# Patient Record
Sex: FEMALE | Race: WHITE | NOT HISPANIC OR LATINO | Employment: OTHER | URBAN - METROPOLITAN AREA
[De-identification: names, ages, dates, MRNs, and addresses within clinical notes are randomized per-mention and may not be internally consistent; named-entity substitution may affect disease eponyms.]

---

## 2017-06-14 ENCOUNTER — APPOINTMENT (OUTPATIENT)
Dept: LAB | Facility: CLINIC | Age: 77
End: 2017-06-14
Payer: MEDICARE

## 2017-06-14 DIAGNOSIS — E78.5 HYPERLIPIDEMIA: ICD-10-CM

## 2017-06-14 DIAGNOSIS — E03.9 HYPOTHYROIDISM: ICD-10-CM

## 2017-06-14 LAB
ALBUMIN SERPL BCP-MCNC: 3.4 G/DL (ref 3.5–5)
ALP SERPL-CCNC: 69 U/L (ref 46–116)
ALT SERPL W P-5'-P-CCNC: 24 U/L (ref 12–78)
ANION GAP SERPL CALCULATED.3IONS-SCNC: 8 MMOL/L (ref 4–13)
AST SERPL W P-5'-P-CCNC: 15 U/L (ref 5–45)
BASOPHILS # BLD AUTO: 0.06 THOUSANDS/ΜL (ref 0–0.1)
BASOPHILS NFR BLD AUTO: 1 % (ref 0–1)
BILIRUB SERPL-MCNC: 0.47 MG/DL (ref 0.2–1)
BUN SERPL-MCNC: 17 MG/DL (ref 5–25)
CALCIUM SERPL-MCNC: 8.6 MG/DL (ref 8.3–10.1)
CHLORIDE SERPL-SCNC: 104 MMOL/L (ref 100–108)
CHOLEST SERPL-MCNC: 182 MG/DL (ref 50–200)
CO2 SERPL-SCNC: 28 MMOL/L (ref 21–32)
CREAT SERPL-MCNC: 0.78 MG/DL (ref 0.6–1.3)
EOSINOPHIL # BLD AUTO: 0.33 THOUSAND/ΜL (ref 0–0.61)
EOSINOPHIL NFR BLD AUTO: 5 % (ref 0–6)
ERYTHROCYTE [DISTWIDTH] IN BLOOD BY AUTOMATED COUNT: 13.3 % (ref 11.6–15.1)
GFR SERPL CREATININE-BSD FRML MDRD: >60 ML/MIN/1.73SQ M
GLUCOSE P FAST SERPL-MCNC: 85 MG/DL (ref 65–99)
HCT VFR BLD AUTO: 43.2 % (ref 34.8–46.1)
HDLC SERPL-MCNC: 57 MG/DL (ref 40–60)
HGB BLD-MCNC: 14.2 G/DL (ref 11.5–15.4)
LDLC SERPL CALC-MCNC: 107 MG/DL (ref 0–100)
LYMPHOCYTES # BLD AUTO: 2.11 THOUSANDS/ΜL (ref 0.6–4.47)
LYMPHOCYTES NFR BLD AUTO: 32 % (ref 14–44)
MCH RBC QN AUTO: 30.7 PG (ref 26.8–34.3)
MCHC RBC AUTO-ENTMCNC: 32.9 G/DL (ref 31.4–37.4)
MCV RBC AUTO: 94 FL (ref 82–98)
MONOCYTES # BLD AUTO: 0.57 THOUSAND/ΜL (ref 0.17–1.22)
MONOCYTES NFR BLD AUTO: 9 % (ref 4–12)
NEUTROPHILS # BLD AUTO: 3.5 THOUSANDS/ΜL (ref 1.85–7.62)
NEUTS SEG NFR BLD AUTO: 53 % (ref 43–75)
NRBC BLD AUTO-RTO: 0 /100 WBCS
PLATELET # BLD AUTO: 261 THOUSANDS/UL (ref 149–390)
PMV BLD AUTO: 10.1 FL (ref 8.9–12.7)
POTASSIUM SERPL-SCNC: 4 MMOL/L (ref 3.5–5.3)
PROT SERPL-MCNC: 7.2 G/DL (ref 6.4–8.2)
RBC # BLD AUTO: 4.62 MILLION/UL (ref 3.81–5.12)
SODIUM SERPL-SCNC: 140 MMOL/L (ref 136–145)
T4 FREE SERPL-MCNC: 1.28 NG/DL (ref 0.76–1.46)
TRIGL SERPL-MCNC: 92 MG/DL
TSH SERPL DL<=0.05 MIU/L-ACNC: 2.25 UIU/ML (ref 0.36–3.74)
WBC # BLD AUTO: 6.58 THOUSAND/UL (ref 4.31–10.16)

## 2017-06-14 PROCEDURE — 85025 COMPLETE CBC W/AUTO DIFF WBC: CPT

## 2017-06-14 PROCEDURE — 80053 COMPREHEN METABOLIC PANEL: CPT

## 2017-06-14 PROCEDURE — 84439 ASSAY OF FREE THYROXINE: CPT

## 2017-06-14 PROCEDURE — 84443 ASSAY THYROID STIM HORMONE: CPT

## 2017-06-14 PROCEDURE — 36415 COLL VENOUS BLD VENIPUNCTURE: CPT

## 2017-06-14 PROCEDURE — 80061 LIPID PANEL: CPT

## 2017-06-16 ENCOUNTER — ALLSCRIPTS OFFICE VISIT (OUTPATIENT)
Dept: OTHER | Facility: OTHER | Age: 77
End: 2017-06-16

## 2017-06-16 DIAGNOSIS — E03.9 HYPOTHYROIDISM: ICD-10-CM

## 2017-06-16 DIAGNOSIS — I10 ESSENTIAL (PRIMARY) HYPERTENSION: ICD-10-CM

## 2017-12-20 ENCOUNTER — ALLSCRIPTS OFFICE VISIT (OUTPATIENT)
Dept: OTHER | Facility: OTHER | Age: 77
End: 2017-12-20

## 2017-12-20 DIAGNOSIS — E78.5 HYPERLIPIDEMIA: ICD-10-CM

## 2017-12-20 DIAGNOSIS — E55.9 VITAMIN D DEFICIENCY: ICD-10-CM

## 2017-12-20 DIAGNOSIS — R25.2 CRAMP AND SPASM: ICD-10-CM

## 2017-12-20 DIAGNOSIS — E03.9 HYPOTHYROIDISM: ICD-10-CM

## 2017-12-21 NOTE — PROGRESS NOTES
Assessment   1  Hyperlipidemia (272 4) (E78 5)   2  Hypertension, benign (401 1) (I10)   3  Hypothyroidism (244 9) (E03 9)   4  Leg cramping (729 82) (R25 2)    Plan   Allergic rhinitis    · Fluticasone Propionate 50 MCG/ACT Nasal Suspension; INSTILL 2 SPRAYS IN EACH    NOSTRIL EVERY DAY  Health Maintenance    · Aspirin 81 MG Oral Tablet Delayed Release; take 1 tablet by mouth every day  Hyperlipidemia    · (1) COMPREHENSIVE METABOLIC PANEL; Status:Active; Requested for:30Sro5033;    · (1) COMPREHENSIVE METABOLIC PANEL; Status:Active; Requested ICI:34KLA7548;    · (1) LIPID PANEL, FASTING; Status:Active; Requested for:96Hau2879;    · (1) LIPID PANEL, FASTING; Status:Active; Requested UXA:90TEZ1824;   Hypertension, benign    · Lisinopril 5 MG Oral Tablet; Take 1 tablet by mouth  daily   · (1) CBC/PLT/DIFF; Status:Canceled;    · (1) COMPREHENSIVE METABOLIC PANEL; Status:Canceled;    · (1) LIPID PANEL, FASTING; Status:Canceled; Hypothyroidism    · Levothyroxine Sodium 75 MCG Oral Tablet; Take 1 tablet by mouth  daily   · (1) T4, FREE; Status:Active; Requested for:05Tqf1247;    · (1) T4, FREE; Status:Active; Requested GZH:72UWN5528;    · (1) T4, FREE; Status:Canceled;    · (1) TSH; Status:Active; Requested for:92Gyy1110;    · (1) TSH; Status:Active; Requested RTH:48MUY3519;    · (1) TSH; Status:Canceled;   Leg cramping    · (1) MAGNESIUM; Status:Active; Requested for:83Xxk0502;   Screen for colon cancer    · (1) COLOGUARD; Status:Active;  Requested for:92Kuz0261;   cont  : I authorize [address] to obtain reimbursement for        Cologuard and to directly contact and collect a second sample from the paient        if reportable results are not obtained from the initial sample   cont  : I accept responsibility for maintaining the privacy of test results and        related information as required by HIPAA   : By ordering Cologuard, I certify that I am a licensed medical professional        authorized to order Cologuard  I acknowledge that the test is medically necessary and        that the patient is eligible to use Cologuard  Vitamin D deficiency    · (1) VITAMIN D 25-HYDROXY; Status:Active; Requested for:91Ehk2547;     Discussion/Summary   Discussion Summary:    Continue current medications  Have current labs done and we will discuss when available  Follow up in 6 months with labs  Counseling Documentation With Imm: The patient was counseled regarding diagnostic results,-- instructions for management,-- risk factor reductions,-- impressions,-- risks and benefits of treatment options,-- importance of compliance with treatment  Medication SE Review and Pt Understands Tx: Possible side effects of new medications were reviewed with the patient/guardian today  The treatment plan was reviewed with the patient/guardian  The patient/guardian understands and agrees with the treatment plan    Self Referrals:    Self Referrals: No      Chief Complaint   Chief Complaint Free Text Note Form: Patient here for follow up  History of Present Illness   HPI: Follow-up did not have her labs done for this visit  She will have them done and we will discuss when available  Controlled  Denies cp, palp, sob, edema, HA, dizziness  Her last lipids showed decent control on no medication  Last labs found her to be euthyroid  She is asymptomatic at this time  of mild upper respiratory symptoms at this time that have been gradually improving  cramps continue to be a problem  This has been going on since she was a teenager  She will spontaneously have cramping in her toes and or her feet  We discussed all available remedies, and other than trying gabapentin which had been ordered for her in the past she is pretty much tried everything  She can tolerate the discomfort therefor really does not want any medication, and does not want to try the gabapentin        Review of Systems   Complete-Female:      Constitutional: no fever,-- not feeling poorly,-- no chills-- and-- not feeling tired  Cardiovascular: No complaints of slow heart rate, no fast heart rate, no chest pain, no palpitations, no leg claudication, no lower extremity edema  Respiratory: No complaints of shortness of breath, no wheezing, no cough, no SOB on exertion, no orthopnea, no PND  Gastrointestinal: No complaints of abdominal pain, no constipation, no nausea or vomiting, no diarrhea, no bloody stools  Genitourinary: No complaints of dysuria, no incontinence, no pelvic pain, no dysmenorrhea, no vaginal discharge or bleeding  Musculoskeletal: as noted in HPI,-- no arthralgias-- and-- no myalgias  Integumentary: no rashes  Neurological: no headache,-- no confusion,-- no dizziness-- and-- no fainting  Hematologic/Lymphatic: No complaints of swollen glands, no swollen glands in the neck, does not bleed easily, does not bruise easily  Active Problems   1  Allergic rhinitis (477 9) (J30 9)   2  Drug therapy (V58 69) (Z79 899)   3  Hyperlipidemia (272 4) (E78 5)   4  Hypertension, benign (401 1) (I10)   5  Hypothyroidism (244 9) (E03 9)   6  Leg cramping (729 82) (R25 2)   7  Screen for colon cancer (V76 51) (Z12 11)   8  Screening for genitourinary condition (V81 6) (Z13 89)   9  Screening for osteoporosis (V82 81) (Z13 820)   10  Visit for screening mammogram (V76 12) (Z12 31)   11  Vitamin D deficiency (268 9) (E55 9)    Past Medical History   1  Denied: History of mental disorder   2  History of Lumbar stenosis (724 02) (M48 061)   3  History of Transient global amnesia (437 7) (G45 4)   4  History of Visit for screening mammogram (L55 91) (Z12 31)  Active Problems And Past Medical History Reviewed: The active problems and past medical history were reviewed and updated today  Surgical History   Surgical History Reviewed: The surgical history was reviewed and updated today  Family History   Mother    1   Family history of CVA (cerebral infarction)   2  Family history of   Father    3  Family history of    4  Family history of Pancreatic carcinoma  Sister    5  Family history of Hyperlipidemia  Family History Reviewed: The family history was reviewed and updated today  Social History    · Alcohol use   · Former smoker (V15 82) (H90 436)   · Full-time employment   · Denied: History of Illicit drug use   ·    · Three children  Social History Reviewed: The social history was reviewed and updated today  The social history was reviewed and is unchanged  Current Meds    1  Fluticasone Propionate 50 MCG/ACT Nasal Suspension; INSTILL 2 SPRAYS IN EACH NOSTRIL     EVERY DAY; Therapy: 81CJD6905 to (Mana Chaves)  Requested for: 92TFU0667; Last Rx:98Tin6528     Ordered   2  Levothyroxine Sodium 75 MCG Oral Tablet; Take 1 tablet by mouth  daily; Therapy: 69CEP2721 to (Evaluate:34Puf5799)  Requested for: 47GBU9921; Last Rx:01Ide7749     Ordered   3  Lisinopril 5 MG Oral Tablet; Take 1 tablet by mouth  daily; Therapy: 40BWL6561 to (Evaluate:57Dpv5643)  Requested for: 18SFW8644; Last WH:79BHK0506     Ordered  Medication List Reviewed: The medication list was reviewed and updated today  Allergies   1  Sulfa Drugs    Vitals   Vital Signs    Recorded: 2017 11:37AM   Temperature 97 9 F   Heart Rate 72   Systolic 646   Diastolic 82   Height 5 ft 2 in   Weight 134 lb    BMI Calculated 24 51   BSA Calculated 1 61   O2 Saturation 97     Physical Exam        Constitutional      General appearance: No acute distress, well appearing and well nourished  Eyes      Conjunctiva and lids: No swelling, erythema or discharge  Ears, Nose, Mouth, and Throat      External inspection of ears and nose: Normal        Otoscopic examination: Tympanic membranes translucent with normal light reflex  Canals patent without erythema         Nasal mucosa, septum, and turbinates: Normal without edema or erythema  Oropharynx: Normal with no erythema, edema, exudate or lesions  Pulmonary      Respiratory effort: No increased work of breathing or signs of respiratory distress  Auscultation of lungs: Clear to auscultation  Cardiovascular      Auscultation of heart: Normal rate and rhythm, normal S1 and S2, without murmurs  Examination of extremities for edema and/or varicosities: Normal        Carotid pulses: Normal        Lymphatic      Palpation of lymph nodes in neck: No lymphadenopathy  Musculoskeletal      Gait and station: Normal        Skin      Skin and subcutaneous tissue: Normal without rashes or lesions         Psychiatric      Mood and affect: Normal           Signatures    Electronically signed by : Sterling Conn [facility]; Dec 20 2017 12:15PM EST                       (Author)     Electronically signed by : Sohail Mcclure MD; Dec 20 2017  1:59PM EST

## 2018-01-12 ENCOUNTER — GENERIC CONVERSION - ENCOUNTER (OUTPATIENT)
Dept: OTHER | Facility: OTHER | Age: 78
End: 2018-01-12

## 2018-01-12 LAB — COLOGUARD (HISTORICAL): NORMAL

## 2018-01-12 NOTE — RESULT NOTES
Verified Results  (1) CBC/PLT/DIFF 33JIC7465 10:01AM [place] [facility] Order Number: [id number]     Test Name Result Flag Reference   WBC COUNT 7 51 Thousand/uL  4 31-10 16   RBC COUNT 4 89 Million/uL  3 81-5 12   HEMOGLOBIN 15 0 g/dL  11 5-15 4   HEMATOCRIT 45 6 %  34 8-46  1   MCV 93 fL  82-98   MCH 30 7 pg  26 8-34 3   MCHC 32 9 g/dL  31 4-37 4   RDW 13 4 %  11 6-15 1   MPV 10 0 fL  8 9-12 7   PLATELET COUNT 276 Thousands/uL  149-390   nRBC AUTOMATED 0 /100 WBCs     NEUTROPHILS RELATIVE PERCENT 53 %  43-75   LYMPHOCYTES RELATIVE PERCENT 32 %  14-44   MONOCYTES RELATIVE PERCENT 9 %  4-12   EOSINOPHILS RELATIVE PERCENT 4 %  0-6   BASOPHILS RELATIVE PERCENT 2 % H 0-1   NEUTROPHILS ABSOLUTE COUNT 4 00 Thousands/?L  1 85-7 62   LYMPHOCYTES ABSOLUTE COUNT 2 42 Thousands/?L  0 60-4 47   MONOCYTES ABSOLUTE COUNT 0 64 Thousand/?L  0 17-1 22   EOSINOPHILS ABSOLUTE COUNT 0 32 Thousand/?L  0 00-0 61   BASOPHILS ABSOLUTE COUNT 0 11 Thousands/?L H 0 00-0 10     (1) COMPREHENSIVE METABOLIC PANEL 99TZW5700 92:03TY [place] [facility] Order Number: [id number]     Test Name Result Flag Reference   GLUCOSE,RANDM 78 mg/dL     If the patient is fasting, the ADA then defines impaired fasting glucose as > 100 mg/dL and diabetes as > or equal to 123 mg/dL     SODIUM 139 mmol/L  136-145   POTASSIUM 3 6 mmol/L  3 5-5 3   CHLORIDE 102 mmol/L  100-108   CARBON DIOXIDE 30 mmol/L  21-32   ANION GAP (CALC) 7 mmol/L  4-13   BLOOD UREA NITROGEN 13 mg/dL  5-25   CREATININE 0 86 mg/dL  0 60-1 30   Standardized to IDMS reference method   CALCIUM 9 0 mg/dL  8 3-10 1   BILI, TOTAL 0 55 mg/dL  0 20-1 00   ALK PHOSPHATAS 72 U/L     ALT (SGPT) 25 U/L  12-78   AST(SGOT) 16 U/L  5-45   ALBUMIN 3 7 g/dL  3 5-5 0   TOTAL PROTEIN 7 7 g/dL  6 4-8 2   eGFR Non-African American      >60 0 ml/min/1 73sq m   [facility] Disease Education Program recommendations are as follows:  GFR calculation is accurate only with a steady state creatinine  Chronic Kidney disease less than 60 ml/min/1 73 sq  meters  Kidney failure less than 15 ml/min/1 73 sq  meters  (1) LIPID PANEL, FASTING 95Vjq3102 10:01AM [facility] Order Number: [id number]     Test Name Result Flag Reference   CHOLESTEROL 210 mg/dL H    HDL,DIRECT 59 mg/dL  40-60   Specimen collection should occur prior to Metamizole administration due to the potential for falsely depressed results  LDL CHOLESTEROL CALCULATED 128 mg/dL H 0-100   Triglyceride:         Normal              <150 mg/dl       Borderline High    150-199 mg/dl       High               200-499 mg/dl       Very High          >499 mg/dl  Cholesterol:         Desirable        <200 mg/dl      Borderline High  200-239 mg/dl      High             >239 mg/dl  HDL Cholesterol:        High    >59 mg/dL      Low     <41 mg/dL  LDL CALCULATED:    This screening LDL is a calculated result  It does not have the accuracy of the Direct Measured LDL in the monitoring of patients with hyperlipidemia and/or statin therapy  Direct Measure LDL (UPI405) must be ordered separately in these patients  TRIGLYCERIDES 117 mg/dL  <=150   Specimen collection should occur prior to N-Acetylcysteine or Metamizole administration due to the potential for falsely depressed results  (1) T4, FREE 24Byv2602 10:01AM [facility] Order Number: [id number]     Test Name Result Flag Reference   T4,FREE 1 24 ng/dL  0 76-1 46     (1) TSH 60HEA4099 10:01AM [facility] Order Number: [id number]     Test Name Result Flag Reference   TSH 6 180 uIU/mL H 0 358-3 740   Patients undergoing fluorescein dye angiography may retain small amounts of fluorescein in the body for 48-72 hours post procedure  Samples containing fluorescein can produce falsely depressed TSH values  If the patient had this procedure,a specimen should be resubmitted post fluorescein clearance            The recommended reference ranges for TSH during pregnancy are as follows:  First trimester 0 1 to 2 5 uIU/mL  Second trimester  0 2 to 3 0 uIU/mL  Third trimester 0 3 to 3 0 uIU/m

## 2018-01-13 VITALS
HEIGHT: 62 IN | OXYGEN SATURATION: 97 % | WEIGHT: 134 LBS | HEART RATE: 81 BPM | DIASTOLIC BLOOD PRESSURE: 80 MMHG | SYSTOLIC BLOOD PRESSURE: 140 MMHG | BODY MASS INDEX: 24.66 KG/M2

## 2018-01-18 NOTE — RESULT NOTES
Message   Labs OK  Cholesterol slightly high  Verified Results  (1) CBC/PLT/DIFF 01KWP2433 11:21AM [facility] Order Number: [id number]  TW Order Number: [id number]     Test Name Result Flag Reference   WBC COUNT 7 21 Thousand/uL  4 31-10 16   RBC COUNT 4 79 Million/uL  3 81-5 12   HEMOGLOBIN 14 7 g/dL  11 5-15 4   HEMATOCRIT 44 5 %  34 8-46  1   MCV 93 fL  82-98   MCH 30 7 pg  26 8-34 3   MCHC 33 0 g/dL  31 4-37 4   RDW 13 4 %  11 6-15 1   MPV 10 2 fL  8 9-12 7   PLATELET COUNT 618 Thousands/uL  149-390   nRBC AUTOMATED 0 /100 WBCs     NEUTROPHILS RELATIVE PERCENT 47 %  43-75   LYMPHOCYTES RELATIVE PERCENT 37 %  14-44   MONOCYTES RELATIVE PERCENT 9 %  4-12   EOSINOPHILS RELATIVE PERCENT 6 %  0-6   BASOPHILS RELATIVE PERCENT 1 %  0-1   NEUTROPHILS ABSOLUTE COUNT 3 37 Thousands/?L  1 85-7 62   LYMPHOCYTES ABSOLUTE COUNT 2 64 Thousands/?L  0 60-4 47   MONOCYTES ABSOLUTE COUNT 0 67 Thousand/?L  0 17-1 22   EOSINOPHILS ABSOLUTE COUNT 0 43 Thousand/?L  0 00-0 61   BASOPHILS ABSOLUTE COUNT 0 08 Thousands/?L  0 00-0 10     (1) COMPREHENSIVE METABOLIC PANEL 50ANN0881 61:54WK [facility]    Order Number: [id number]  TW Order Number: [id number] Order Number: [id number] Order Number: [id number] Order Number: [id number]     Test Name Result Flag Reference   GLUCOSE,RANDM 84 mg/dL     If the patient is fasting, the ADA then defines impaired fasting glucose as > 100 mg/dL and diabetes as > or equal to 123 mg/dL     SODIUM 137 mmol/L  136-145   POTASSIUM 4 1 mmol/L  3 5-5 3   CHLORIDE 102 mmol/L  100-108   CARBON DIOXIDE 30 mmol/L  21-32   ANION GAP (CALC) 5 mmol/L  4-13   BLOOD UREA NITROGEN 14 mg/dL  5-25   CREATININE 0 82 mg/dL  0 60-1 30   Standardized to IDMS reference method   CALCIUM 8 9 mg/dL  8 3-10 1   BILI, TOTAL 0 57 mg/dL  0 20-1 00   ALK PHOSPHATAS 63 U/L     ALT (SGPT) 26 U/L  12-78   AST(SGOT) 17 U/L  5-45 ALBUMIN 3 5 g/dL  3 5-5 0   TOTAL PROTEIN 7 5 g/dL  6 4-8 2   eGFR Non-African American      >60 0 ml/min/1 73sq [facility] Disease Education Program recommendations are as follows:  GFR calculation is accurate only with a steady state creatinine  Chronic Kidney disease less than 60 ml/min/1 73 sq  meters  Kidney failure less than 15 ml/min/1 73 sq  meters  (1) LIPID PANEL, FASTING 10Jun2016 11:21AM [facility] [place] Order Number: [id number]  TW Order Number: [id number] Order Number: [id number] Order Number: [id number] Order Number: [id number]     Test Name Result Flag Reference   CHOLESTEROL 199 mg/dL     HDL,DIRECT 57 mg/dL  40-60   Specimen collection should occur prior to Metamizole administration due to the potential for falsely depressed results  LDL CHOLESTEROL CALCULATED 121 mg/dL H 0-100   Triglyceride:         Normal              <150 mg/dl       Borderline High    150-199 mg/dl       High               200-499 mg/dl       Very High          >499 mg/dl  Cholesterol:         Desirable        <200 mg/dl      Borderline High  200-239 mg/dl      High             >239 mg/dl  HDL Cholesterol:        High    >59 mg/dL      Low     <41 mg/dL  LDL CALCULATED:    This screening LDL is a calculated result  It does not have the accuracy of the Direct Measured LDL in the monitoring of patients with hyperlipidemia and/or statin therapy  Direct Measure LDL (GGT997) must be ordered separately in these patients  TRIGLYCERIDES 106 mg/dL  <=150   Specimen collection should occur prior to N-Acetylcysteine or Metamizole administration due to the potential for falsely depressed results       (1) T4, FREE 10Jun2016 11:21AM [facility] [place] Order Number: [id number]  TW Order Number: [id number] Order Number: [id number] Order Number: [id number] Order Number: [id number]     Test Name Result Flag Reference   T4,FREE 1 16 ng/dL  0 76-1 46     (1) TSH 52LGF7288 11:21AM [facility] Order Number: [id number]  TW Order Number: [id number] Order Number: [id number] Order Number: [id number] Order Number: [id number]     Test Name Result Flag Reference   TSH 3 130 uIU/mL  0 358-3 740   The recommended reference ranges for TSH during pregnancy are as follows:  First trimester 0 1 to 2 5 uIU/mL  Second trimester  0 2 to 3 0 uIU/mL  Third trimester 0 3 to 3 0 uIU/m

## 2018-01-22 VITALS
OXYGEN SATURATION: 97 % | HEART RATE: 72 BPM | BODY MASS INDEX: 24.66 KG/M2 | SYSTOLIC BLOOD PRESSURE: 122 MMHG | TEMPERATURE: 97.9 F | WEIGHT: 134 LBS | HEIGHT: 62 IN | DIASTOLIC BLOOD PRESSURE: 82 MMHG

## 2018-02-07 DIAGNOSIS — I10 ESSENTIAL HYPERTENSION: Primary | ICD-10-CM

## 2018-02-07 RX ORDER — LISINOPRIL 5 MG/1
TABLET ORAL
Qty: 90 TABLET | Refills: 1 | Status: SHIPPED | OUTPATIENT
Start: 2018-02-07 | End: 2018-03-16 | Stop reason: SDUPTHER

## 2018-02-19 ENCOUNTER — TELEPHONE (OUTPATIENT)
Dept: INTERNAL MEDICINE CLINIC | Facility: CLINIC | Age: 78
End: 2018-02-19

## 2018-02-19 NOTE — TELEPHONE ENCOUNTER
Patient called and would like to know the result of her cologuard  She would like a call back when you know the results

## 2018-03-16 DIAGNOSIS — I10 ESSENTIAL HYPERTENSION: ICD-10-CM

## 2018-03-16 RX ORDER — LISINOPRIL 5 MG/1
5 TABLET ORAL DAILY
Qty: 90 TABLET | Refills: 1 | Status: SHIPPED | OUTPATIENT
Start: 2018-03-16 | End: 2018-08-25 | Stop reason: SDUPTHER

## 2018-03-16 NOTE — TELEPHONE ENCOUNTER
Please send refill of lisinopril 5 [facility]  Pt request 90 days supply with refills if possible

## 2018-04-06 ENCOUNTER — APPOINTMENT (OUTPATIENT)
Dept: LAB | Facility: CLINIC | Age: 78
End: 2018-04-06
Payer: MEDICARE

## 2018-04-06 DIAGNOSIS — E03.9 HYPOTHYROIDISM: ICD-10-CM

## 2018-04-06 DIAGNOSIS — R25.2 CRAMP AND SPASM: ICD-10-CM

## 2018-04-06 DIAGNOSIS — E78.5 HYPERLIPIDEMIA: ICD-10-CM

## 2018-04-06 DIAGNOSIS — E55.9 VITAMIN D DEFICIENCY: ICD-10-CM

## 2018-04-06 LAB
25(OH)D3 SERPL-MCNC: 31.8 NG/ML (ref 30–100)
ALBUMIN SERPL BCP-MCNC: 3.7 G/DL (ref 3.5–5)
ALP SERPL-CCNC: 69 U/L (ref 46–116)
ALT SERPL W P-5'-P-CCNC: 24 U/L (ref 12–78)
ANION GAP SERPL CALCULATED.3IONS-SCNC: 3 MMOL/L (ref 4–13)
AST SERPL W P-5'-P-CCNC: 19 U/L (ref 5–45)
BILIRUB SERPL-MCNC: 0.62 MG/DL (ref 0.2–1)
BUN SERPL-MCNC: 17 MG/DL (ref 5–25)
CALCIUM SERPL-MCNC: 9.1 MG/DL (ref 8.3–10.1)
CHLORIDE SERPL-SCNC: 102 MMOL/L (ref 100–108)
CHOLEST SERPL-MCNC: 196 MG/DL (ref 50–200)
CO2 SERPL-SCNC: 31 MMOL/L (ref 21–32)
CREAT SERPL-MCNC: 0.87 MG/DL (ref 0.6–1.3)
GFR SERPL CREATININE-BSD FRML MDRD: 65 ML/MIN/1.73SQ M
GLUCOSE P FAST SERPL-MCNC: 81 MG/DL (ref 65–99)
HDLC SERPL-MCNC: 61 MG/DL (ref 40–60)
LDLC SERPL CALC-MCNC: 108 MG/DL (ref 0–100)
MAGNESIUM SERPL-MCNC: 2.3 MG/DL (ref 1.6–2.6)
NONHDLC SERPL-MCNC: 135 MG/DL
POTASSIUM SERPL-SCNC: 4.1 MMOL/L (ref 3.5–5.3)
PROT SERPL-MCNC: 7.6 G/DL (ref 6.4–8.2)
SODIUM SERPL-SCNC: 136 MMOL/L (ref 136–145)
T4 FREE SERPL-MCNC: 1.07 NG/DL (ref 0.76–1.46)
TRIGL SERPL-MCNC: 133 MG/DL
TSH SERPL DL<=0.05 MIU/L-ACNC: 6.56 UIU/ML (ref 0.36–3.74)

## 2018-04-06 PROCEDURE — 84443 ASSAY THYROID STIM HORMONE: CPT

## 2018-04-06 PROCEDURE — 80053 COMPREHEN METABOLIC PANEL: CPT

## 2018-04-06 PROCEDURE — 82306 VITAMIN D 25 HYDROXY: CPT

## 2018-04-06 PROCEDURE — 84439 ASSAY OF FREE THYROXINE: CPT

## 2018-04-06 PROCEDURE — 80061 LIPID PANEL: CPT

## 2018-04-06 PROCEDURE — 83735 ASSAY OF MAGNESIUM: CPT

## 2018-04-06 PROCEDURE — 36415 COLL VENOUS BLD VENIPUNCTURE: CPT

## 2018-04-10 ENCOUNTER — TELEPHONE (OUTPATIENT)
Dept: INTERNAL MEDICINE CLINIC | Facility: CLINIC | Age: 78
End: 2018-04-10

## 2018-04-10 NOTE — TELEPHONE ENCOUNTER
I gave Patient the results from blood test  Her thyroid was up and she wanted to know if she should stop taking her thyroid med one day out of the week  She said last time it was up dr Harper Boyce recommended that and so she stopped taking it every Sunday  Should she do that or should she not worry about doing it?      Call with message

## 2018-06-20 DIAGNOSIS — E03.9 HYPOTHYROIDISM: ICD-10-CM

## 2018-06-20 DIAGNOSIS — E78.5 HYPERLIPIDEMIA: ICD-10-CM

## 2018-08-03 ENCOUNTER — OFFICE VISIT (OUTPATIENT)
Dept: INTERNAL MEDICINE CLINIC | Facility: CLINIC | Age: 78
End: 2018-08-03
Payer: MEDICARE

## 2018-08-03 VITALS
DIASTOLIC BLOOD PRESSURE: 88 MMHG | BODY MASS INDEX: 25.1 KG/M2 | HEART RATE: 78 BPM | SYSTOLIC BLOOD PRESSURE: 138 MMHG | OXYGEN SATURATION: 96 % | HEIGHT: 62 IN | WEIGHT: 136.4 LBS

## 2018-08-03 DIAGNOSIS — M54.2 NECK PAIN ON RIGHT SIDE: Primary | ICD-10-CM

## 2018-08-03 PROCEDURE — 99213 OFFICE O/P EST LOW 20 MIN: CPT | Performed by: INTERNAL MEDICINE

## 2018-08-03 RX ORDER — LEVOTHYROXINE SODIUM 0.07 MG/1
1 TABLET ORAL DAILY
COMMUNITY
Start: 2014-06-12 | End: 2019-01-28 | Stop reason: SDUPTHER

## 2018-08-03 RX ORDER — METHYLPREDNISOLONE 4 MG/1
TABLET ORAL
Qty: 21 TABLET | Refills: 0 | Status: SHIPPED | OUTPATIENT
Start: 2018-08-03 | End: 2018-08-10

## 2018-08-03 RX ORDER — FLUTICASONE PROPIONATE 50 MCG
SPRAY, SUSPENSION (ML) NASAL
COMMUNITY
Start: 2014-02-03 | End: 2019-04-30 | Stop reason: SDUPTHER

## 2018-08-03 NOTE — PROGRESS NOTES
Assessment/Plan:      Probably a cervical muscle strain secondary to arthritis  Will try her with a Medrol Dosepak  She was instructed to hold her Advil until this is finished  Told her she may try the chiropractor if she wishes  Continue the heating pad  Return if symptoms worsen or fail to improve  No problem-specific Assessment & Plan notes found for this encounter  Diagnoses and all orders for this visit:    Neck pain on right side  -     Methylprednisolone 4 MG TBPK; Use as directed on package    Other orders  -     levothyroxine 75 mcg tablet; Take 1 tablet by mouth daily  -     aspirin 81 MG tablet; Take 1 tablet by mouth daily  -     fluticasone (FLONASE) 50 mcg/act nasal spray; into each nostril          Subjective:      Patient ID: Elsa Prescott is a 70 y o  female  Patient comes in today complaining of several days of right-sided neck pain  She thinks it started when her dog pulled her while she was holding the leash  Since that time, her neck has been stiff and painful, more so on the right side  Sometimes it goes into her shoulder  But she can move the arm and shoulder without a problem  She has tried some Advil and heating pad  That has helped a little bit but she wakes up again in the morning with the stiff neck          ALLERGIES:  Allergies   Allergen Reactions    Sulfa Antibiotics Hives       CURRENT MEDICATIONS:    Current Outpatient Prescriptions:     aspirin 81 MG tablet, Take 1 tablet by mouth daily, Disp: , Rfl:     fluticasone (FLONASE) 50 mcg/act nasal spray, into each nostril, Disp: , Rfl:     levothyroxine 75 mcg tablet, Take 1 tablet by mouth daily, Disp: , Rfl:     lisinopril (ZESTRIL) 5 mg tablet, Take 1 tablet (5 mg total) by mouth daily, Disp: 90 tablet, Rfl: 1    Methylprednisolone 4 MG TBPK, Use as directed on package, Disp: 21 tablet, Rfl: 0    ACTIVE PROBLEM LIST:  Patient Active Problem List   Diagnosis    Allergic rhinitis    Hyperlipidemia    Hypertension, benign    Hypothyroidism    Vitamin D deficiency       PAST MEDICAL HISTORY:  Past Medical History:   Diagnosis Date    Lumbar stenosis     Transient global amnesia        PAST SURGICAL HISTORY:  No past surgical history on file  FAMILY HISTORY:  Family History   Problem Relation Age of Onset    Stroke Mother 61        CVA    Pancreatic cancer Father     Hyperlipidemia Sister        SOCIAL HISTORY:  Social History     Social History    Marital status: /Civil Union     Spouse name: N/A    Number of children: 3    Years of education: [employer]      Full time employment     Social History Main Topics    Smoking status: Former Smoker    Smokeless tobacco: Not on file    Alcohol use Yes      Comment: glass white wine with dinner    Drug use: No    Sexual activity: Not on file     Other Topics Concern    Not on file     Social History Narrative    No narrative on file       Review of Systems   Constitutional: Negative for fever  Neurological: Negative for headaches  Objective:  Vitals:    08/03/18 1255   BP: 138/88   BP Location: Left arm   Patient Position: Sitting   Pulse: 78   SpO2: 96%   Weight: 61 9 kg (136 lb 6 4 oz)   Height: 5' 2" (1 575 m)        Physical Exam   Neck: Muscular tenderness present  Restricted range of motion in the horizontal planes  RESULTS:    No results found for this or any previous visit (from the past 1008 hour(s))  This note was created with voice recognition software  Phonic, grammatical and spelling errors may be present within the note as a result

## 2018-08-03 NOTE — PATIENT INSTRUCTIONS
Neck Exercises   AMBULATORY CARE:   Neck exercises  help reduce neck pain, and improve neck movement and strength  Neck exercises also help prevent long-term neck problems  What you need to know about neck exercises:   · Do the exercises every day,  or as often as directed by your healthcare provider  · Move slowly, gently, and smoothly  Avoid fast or jerky motions  · Stand and sit the way your healthcare provider shows you  Good posture may reduce your neck pain  Check your posture often, even when you are not doing your neck exercises  How to perform neck exercises safely:   · Exercise position:  You may sit or stand while you do neck exercises  Face forward  Your shoulders should be straight and relaxed, with a good posture  · Head tilts, forward and back:  Gently bow your head and try to touch your chin to your chest  Your healthcare provider may tell you to push on the back of your neck to help bow your head  Raise your chin back to the starting position  Tilt your head back as far as possible so you are looking up at the ceiling  Your healthcare provider may tell you to lift your chin to help tilt your head back  Return your head to the starting position  · Head tilts, side to side:  Tilt your head, bringing your ear toward your shoulder  Then tilt your head toward the other shoulder  · Head turns:  Turn your head to look over your shoulder  Tilt your chin down and try to touch it to your shoulder  Do not raise your shoulder to your chin  Face forward again  Do the same on the other side  · Head rolls:  Slowly bring your chin toward your chest  Next, roll your head to the right  Your ear should be positioned over your shoulder  Hold this position for 5 seconds  Roll your head back toward your chest and to the left into the same position  Hold for 5 seconds  Gently roll your head back and around in a clockwise [place] 3 times   Next, move your head in the reverse direction (counterclockwise) in a [place] 3 times  Do not shrug your shoulders upwards while you do this exercise  Contact your healthcare provider if:   · Your pain does not get better, or gets worse  · You have questions or concerns about your condition, care, or exercise program   © 2017 [address] [employer] Information is for End User's use only and may not be sold, redistributed or otherwise used for commercial purposes  All illustrations and images included in CareNotes® are the copyrighted property of [employer]  or [employer]  The above information is an  only  It is not intended as medical advice for individual conditions or treatments  Talk to your doctor, nurse or pharmacist before following any medical regimen to see if it is safe and effective for you

## 2018-08-25 DIAGNOSIS — I10 ESSENTIAL HYPERTENSION: ICD-10-CM

## 2018-08-27 RX ORDER — LISINOPRIL 5 MG/1
TABLET ORAL
Qty: 90 TABLET | Refills: 3 | Status: SHIPPED | OUTPATIENT
Start: 2018-08-27 | End: 2018-10-12 | Stop reason: SDUPTHER

## 2018-10-10 ENCOUNTER — APPOINTMENT (OUTPATIENT)
Dept: LAB | Facility: CLINIC | Age: 78
End: 2018-10-10
Payer: MEDICARE

## 2018-10-10 DIAGNOSIS — E03.9 HYPOTHYROIDISM: ICD-10-CM

## 2018-10-10 DIAGNOSIS — E78.5 HYPERLIPIDEMIA: ICD-10-CM

## 2018-10-10 LAB
ALBUMIN SERPL BCP-MCNC: 3.4 G/DL (ref 3.5–5)
ALP SERPL-CCNC: 61 U/L (ref 46–116)
ALT SERPL W P-5'-P-CCNC: 26 U/L (ref 12–78)
ANION GAP SERPL CALCULATED.3IONS-SCNC: 5 MMOL/L (ref 4–13)
AST SERPL W P-5'-P-CCNC: 18 U/L (ref 5–45)
BILIRUB SERPL-MCNC: 0.65 MG/DL (ref 0.2–1)
BUN SERPL-MCNC: 20 MG/DL (ref 5–25)
CALCIUM SERPL-MCNC: 8.9 MG/DL (ref 8.3–10.1)
CHLORIDE SERPL-SCNC: 101 MMOL/L (ref 100–108)
CHOLEST SERPL-MCNC: 169 MG/DL (ref 50–200)
CO2 SERPL-SCNC: 28 MMOL/L (ref 21–32)
CREAT SERPL-MCNC: 0.93 MG/DL (ref 0.6–1.3)
GFR SERPL CREATININE-BSD FRML MDRD: 60 ML/MIN/1.73SQ M
GLUCOSE P FAST SERPL-MCNC: 87 MG/DL (ref 65–99)
HDLC SERPL-MCNC: 52 MG/DL (ref 40–60)
LDLC SERPL CALC-MCNC: 92 MG/DL (ref 0–100)
NONHDLC SERPL-MCNC: 117 MG/DL
POTASSIUM SERPL-SCNC: 3.9 MMOL/L (ref 3.5–5.3)
PROT SERPL-MCNC: 7.1 G/DL (ref 6.4–8.2)
SODIUM SERPL-SCNC: 134 MMOL/L (ref 136–145)
T4 FREE SERPL-MCNC: 0.95 NG/DL (ref 0.76–1.46)
TRIGL SERPL-MCNC: 125 MG/DL
TSH SERPL DL<=0.05 MIU/L-ACNC: 8.68 UIU/ML (ref 0.36–3.74)

## 2018-10-10 PROCEDURE — 84439 ASSAY OF FREE THYROXINE: CPT

## 2018-10-10 PROCEDURE — 36415 COLL VENOUS BLD VENIPUNCTURE: CPT

## 2018-10-10 PROCEDURE — 80061 LIPID PANEL: CPT

## 2018-10-10 PROCEDURE — 84443 ASSAY THYROID STIM HORMONE: CPT

## 2018-10-10 PROCEDURE — 80053 COMPREHEN METABOLIC PANEL: CPT

## 2018-10-12 ENCOUNTER — OFFICE VISIT (OUTPATIENT)
Dept: INTERNAL MEDICINE CLINIC | Facility: CLINIC | Age: 78
End: 2018-10-12
Payer: MEDICARE

## 2018-10-12 VITALS
HEART RATE: 80 BPM | WEIGHT: 137 LBS | BODY MASS INDEX: 25.21 KG/M2 | HEIGHT: 62 IN | SYSTOLIC BLOOD PRESSURE: 128 MMHG | RESPIRATION RATE: 18 BRPM | DIASTOLIC BLOOD PRESSURE: 80 MMHG

## 2018-10-12 DIAGNOSIS — Z23 NEED FOR VACCINATION: ICD-10-CM

## 2018-10-12 DIAGNOSIS — I10 HYPERTENSION, BENIGN: ICD-10-CM

## 2018-10-12 DIAGNOSIS — E78.2 MIXED HYPERLIPIDEMIA: ICD-10-CM

## 2018-10-12 DIAGNOSIS — E03.9 ACQUIRED HYPOTHYROIDISM: ICD-10-CM

## 2018-10-12 DIAGNOSIS — I10 ESSENTIAL HYPERTENSION: Primary | ICD-10-CM

## 2018-10-12 DIAGNOSIS — I10 ESSENTIAL HYPERTENSION: ICD-10-CM

## 2018-10-12 PROCEDURE — 99214 OFFICE O/P EST MOD 30 MIN: CPT | Performed by: INTERNAL MEDICINE

## 2018-10-12 RX ORDER — LISINOPRIL 5 MG/1
5 TABLET ORAL DAILY
Qty: 90 TABLET | Refills: 1 | Status: SHIPPED | OUTPATIENT
Start: 2018-10-12 | End: 2018-10-12 | Stop reason: SDUPTHER

## 2018-10-12 RX ORDER — LISINOPRIL 5 MG/1
5 TABLET ORAL DAILY
Qty: 90 TABLET | Refills: 3 | Status: SHIPPED | OUTPATIENT
Start: 2018-10-12 | End: 2019-10-01 | Stop reason: SDUPTHER

## 2018-10-12 NOTE — PROGRESS NOTES
Assessment/Plan:      Chronic problems are stable  Continue present medications  Continue diet and exercise  Ordered labs for next visit  She is going to start taking a half tablet of her thyroid medicine on Sundays instead of skipping  Return in about 6 months (around 4/12/2019)  No problem-specific Assessment & Plan notes found for this encounter  Diagnoses and all orders for this visit:    Essential hypertension  -     lisinopril (ZESTRIL) 5 mg tablet; Take 1 tablet (5 mg total) by mouth daily  -     CBC and differential; Future  -     Comprehensive metabolic panel; Future  -     Lipid panel; Future    Hypertension, benign    Acquired hypothyroidism  -     T4, free; Future  -     TSH, 3rd generation; Future    Mixed hyperlipidemia    Need for vaccination  -     influenza vaccine, [id number], high-dose, PF 0 5 mL, for patients 65 yr+ (FLUZONE HIGH-DOSE)          Subjective:      Patient ID: Margie Lobo is a 70 y o  female  Patient comes in today for routine follow-up  She states she is doing well with no new complaints  She reports she did see Orthopedics for her knee and did have soft tissue tear which is getting better  Her blood pressure has been controlled  Thyroid levels have been controlled but her TSH is a little higher  She has been taking the medicine as directed and skipping 1 day  We had tried a higher dose in the past but that was too much  Cholesterol is controlled  She reports no further additions to her history          ALLERGIES:  Allergies   Allergen Reactions    Sulfa Antibiotics Hives       CURRENT MEDICATIONS:    Current Outpatient Prescriptions:     aspirin 81 MG tablet, Take 1 tablet by mouth daily, Disp: , Rfl:     fluticasone (FLONASE) 50 mcg/act nasal spray, into each nostril, Disp: , Rfl:     levothyroxine 75 mcg tablet, Take 1 tablet by mouth daily, Disp: , Rfl:     lisinopril (ZESTRIL) 5 mg tablet, Take 1 tablet (5 mg total) by mouth daily, Disp: 90 tablet, Rfl: 1    ACTIVE PROBLEM LIST:  Patient Active Problem List   Diagnosis    Allergic rhinitis    Hyperlipidemia    Hypertension, benign    Hypothyroidism    Vitamin D deficiency       PAST MEDICAL HISTORY:  Past Medical History:   Diagnosis Date    Lumbar stenosis     Transient global amnesia        PAST SURGICAL HISTORY:  No past surgical history on file  FAMILY HISTORY:  Family History   Problem Relation Age of Onset    Stroke Mother 61        CVA    Pancreatic cancer Father     Hyperlipidemia Sister        SOCIAL HISTORY:  Social History     Social History    Marital status: /Civil Union     Spouse name: N/A    Number of children: 3    Years of education: [employer]      Full time employment     Social History Main Topics    Smoking status: Former Smoker    Smokeless tobacco: Never Used    Alcohol use Yes      Comment: glass white wine with dinner    Drug use: No    Sexual activity: Not on file     Other Topics Concern    Not on file     Social History Narrative    No narrative on file       Review of Systems   Respiratory: Negative for shortness of breath  Cardiovascular: Negative for chest pain  Gastrointestinal: Negative for abdominal pain  Objective:  Vitals:    10/12/18 0921   BP: 128/80   BP Location: Left arm   Patient Position: Sitting   Cuff Size: Adult   Pulse: 80   Resp: 18   Weight: 62 1 kg (137 lb)   Height: 5' 2" (1 575 m)        Physical Exam   Constitutional: She is oriented to person, place, and time  She appears well-developed and well-nourished  Cardiovascular: Normal rate, regular rhythm and normal heart sounds  Pulmonary/Chest: Effort normal and breath sounds normal    Abdominal: Soft  There is no tenderness  Neurological: She is alert and oriented to person, place, and time  Nursing note and vitals reviewed          RESULTS:    Recent Results (from the past 1008 hour(s))   Lipid panel Collection Time: 10/10/18 10:41 AM   Result Value Ref Range    Cholesterol 169 50 - 200 mg/dL    Triglycerides 125 <=150 mg/dL    HDL, Direct 52 40 - 60 mg/dL    LDL Calculated 92 0 - 100 mg/dL    Non-HDL-Chol (CHOL-HDL) 117 mg/dl   Comprehensive metabolic panel    Collection Time: 10/10/18 10:41 AM   Result Value Ref Range    Sodium 134 (L) 136 - 145 mmol/L    Potassium 3 9 3 5 - 5 3 mmol/L    Chloride 101 100 - 108 mmol/L    CO2 28 21 - 32 mmol/L    ANION GAP 5 4 - 13 mmol/L    BUN 20 5 - 25 mg/dL    Creatinine 0 93 0 60 - 1 30 mg/dL    Glucose, Fasting 87 65 - 99 mg/dL    Calcium 8 9 8 3 - 10 1 mg/dL    AST 18 5 - 45 U/L    ALT 26 12 - 78 U/L    Alkaline Phosphatase 61 46 - 116 U/L    Total Protein 7 1 6 4 - 8 2 g/dL    Albumin 3 4 (L) 3 5 - 5 0 g/dL    Total Bilirubin 0 65 0 20 - 1 00 mg/dL    eGFR 60 ml/min/1 73sq m   T4, free    Collection Time: 10/10/18 10:41 AM   Result Value Ref Range    Free T4 0 95 0 76 - 1 46 ng/dL   TSH, 3rd generation    Collection Time: 10/10/18 10:41 AM   Result Value Ref Range    TSH 3RD GENERATON 8 680 (H) 0 358 - 3 740 uIU/mL       This note was created with voice recognition software  Phonic, grammatical and spelling errors may be present within the note as a result

## 2019-01-28 DIAGNOSIS — E03.9 HYPOTHYROIDISM, UNSPECIFIED TYPE: Primary | ICD-10-CM

## 2019-01-28 RX ORDER — LEVOTHYROXINE SODIUM 0.07 MG/1
TABLET ORAL DAILY
Qty: 90 TABLET | Refills: 1 | Status: SHIPPED | OUTPATIENT
Start: 2019-01-28 | End: 2019-10-01 | Stop reason: SDUPTHER

## 2019-04-30 DIAGNOSIS — J30.1 SEASONAL ALLERGIC RHINITIS DUE TO POLLEN: Primary | ICD-10-CM

## 2019-04-30 RX ORDER — FLUTICASONE PROPIONATE 50 MCG
1 SPRAY, SUSPENSION (ML) NASAL DAILY
Qty: 16 G | Refills: 3 | Status: SHIPPED | OUTPATIENT
Start: 2019-04-30 | End: 2020-10-06 | Stop reason: SDUPTHER

## 2019-05-14 ENCOUNTER — APPOINTMENT (OUTPATIENT)
Dept: LAB | Facility: CLINIC | Age: 79
End: 2019-05-14
Payer: MEDICARE

## 2019-05-14 DIAGNOSIS — E03.9 ACQUIRED HYPOTHYROIDISM: ICD-10-CM

## 2019-05-14 DIAGNOSIS — I10 ESSENTIAL HYPERTENSION: ICD-10-CM

## 2019-05-14 LAB
ALBUMIN SERPL BCP-MCNC: 3.5 G/DL (ref 3.5–5)
ALP SERPL-CCNC: 67 U/L (ref 46–116)
ALT SERPL W P-5'-P-CCNC: 29 U/L (ref 12–78)
ANION GAP SERPL CALCULATED.3IONS-SCNC: 7 MMOL/L (ref 4–13)
AST SERPL W P-5'-P-CCNC: 19 U/L (ref 5–45)
BASOPHILS # BLD AUTO: 0.11 THOUSANDS/ΜL (ref 0–0.1)
BASOPHILS NFR BLD AUTO: 2 % (ref 0–1)
BILIRUB SERPL-MCNC: 0.43 MG/DL (ref 0.2–1)
BUN SERPL-MCNC: 12 MG/DL (ref 5–25)
CALCIUM SERPL-MCNC: 8.8 MG/DL (ref 8.3–10.1)
CHLORIDE SERPL-SCNC: 103 MMOL/L (ref 100–108)
CHOLEST SERPL-MCNC: 198 MG/DL (ref 50–200)
CO2 SERPL-SCNC: 29 MMOL/L (ref 21–32)
CREAT SERPL-MCNC: 0.9 MG/DL (ref 0.6–1.3)
EOSINOPHIL # BLD AUTO: 0.42 THOUSAND/ΜL (ref 0–0.61)
EOSINOPHIL NFR BLD AUTO: 7 % (ref 0–6)
ERYTHROCYTE [DISTWIDTH] IN BLOOD BY AUTOMATED COUNT: 13.2 % (ref 11.6–15.1)
GFR SERPL CREATININE-BSD FRML MDRD: 62 ML/MIN/1.73SQ M
GLUCOSE P FAST SERPL-MCNC: 89 MG/DL (ref 65–99)
HCT VFR BLD AUTO: 46.2 % (ref 34.8–46.1)
HDLC SERPL-MCNC: 58 MG/DL (ref 40–60)
HGB BLD-MCNC: 14.6 G/DL (ref 11.5–15.4)
IMM GRANULOCYTES # BLD AUTO: 0.03 THOUSAND/UL (ref 0–0.2)
IMM GRANULOCYTES NFR BLD AUTO: 1 % (ref 0–2)
LDLC SERPL CALC-MCNC: 122 MG/DL (ref 0–100)
LYMPHOCYTES # BLD AUTO: 2.39 THOUSANDS/ΜL (ref 0.6–4.47)
LYMPHOCYTES NFR BLD AUTO: 38 % (ref 14–44)
MCH RBC QN AUTO: 30.5 PG (ref 26.8–34.3)
MCHC RBC AUTO-ENTMCNC: 31.6 G/DL (ref 31.4–37.4)
MCV RBC AUTO: 97 FL (ref 82–98)
MONOCYTES # BLD AUTO: 0.63 THOUSAND/ΜL (ref 0.17–1.22)
MONOCYTES NFR BLD AUTO: 10 % (ref 4–12)
NEUTROPHILS # BLD AUTO: 2.77 THOUSANDS/ΜL (ref 1.85–7.62)
NEUTS SEG NFR BLD AUTO: 42 % (ref 43–75)
NONHDLC SERPL-MCNC: 140 MG/DL
NRBC BLD AUTO-RTO: 0 /100 WBCS
PLATELET # BLD AUTO: 266 THOUSANDS/UL (ref 149–390)
PMV BLD AUTO: 9.9 FL (ref 8.9–12.7)
POTASSIUM SERPL-SCNC: 4.1 MMOL/L (ref 3.5–5.3)
PROT SERPL-MCNC: 7.3 G/DL (ref 6.4–8.2)
RBC # BLD AUTO: 4.78 MILLION/UL (ref 3.81–5.12)
SODIUM SERPL-SCNC: 139 MMOL/L (ref 136–145)
T4 FREE SERPL-MCNC: 1.31 NG/DL (ref 0.76–1.46)
TRIGL SERPL-MCNC: 88 MG/DL
TSH SERPL DL<=0.05 MIU/L-ACNC: 2.12 UIU/ML (ref 0.36–3.74)
WBC # BLD AUTO: 6.35 THOUSAND/UL (ref 4.31–10.16)

## 2019-05-14 PROCEDURE — 36415 COLL VENOUS BLD VENIPUNCTURE: CPT

## 2019-05-14 PROCEDURE — 80053 COMPREHEN METABOLIC PANEL: CPT

## 2019-05-14 PROCEDURE — 84443 ASSAY THYROID STIM HORMONE: CPT

## 2019-05-14 PROCEDURE — 80061 LIPID PANEL: CPT

## 2019-05-14 PROCEDURE — 84439 ASSAY OF FREE THYROXINE: CPT

## 2019-05-14 PROCEDURE — 85025 COMPLETE CBC W/AUTO DIFF WBC: CPT

## 2019-05-17 ENCOUNTER — OFFICE VISIT (OUTPATIENT)
Dept: INTERNAL MEDICINE CLINIC | Facility: CLINIC | Age: 79
End: 2019-05-17
Payer: MEDICARE

## 2019-05-17 VITALS
WEIGHT: 132 LBS | HEART RATE: 74 BPM | HEIGHT: 62 IN | SYSTOLIC BLOOD PRESSURE: 116 MMHG | RESPIRATION RATE: 16 BRPM | DIASTOLIC BLOOD PRESSURE: 78 MMHG | OXYGEN SATURATION: 98 % | BODY MASS INDEX: 24.29 KG/M2

## 2019-05-17 DIAGNOSIS — E78.2 MIXED HYPERLIPIDEMIA: ICD-10-CM

## 2019-05-17 DIAGNOSIS — E03.9 ACQUIRED HYPOTHYROIDISM: ICD-10-CM

## 2019-05-17 DIAGNOSIS — I10 HYPERTENSION, BENIGN: Primary | ICD-10-CM

## 2019-05-17 DIAGNOSIS — E55.9 VITAMIN D DEFICIENCY: ICD-10-CM

## 2019-05-17 PROCEDURE — 99214 OFFICE O/P EST MOD 30 MIN: CPT | Performed by: INTERNAL MEDICINE

## 2019-10-01 DIAGNOSIS — I10 ESSENTIAL HYPERTENSION: ICD-10-CM

## 2019-10-01 DIAGNOSIS — E03.9 HYPOTHYROIDISM, UNSPECIFIED TYPE: ICD-10-CM

## 2019-10-01 RX ORDER — LEVOTHYROXINE SODIUM 0.07 MG/1
TABLET ORAL DAILY
Qty: 90 TABLET | Refills: 1 | Status: SHIPPED | OUTPATIENT
Start: 2019-10-01 | End: 2020-02-26

## 2019-10-01 RX ORDER — LISINOPRIL 5 MG/1
TABLET ORAL
Qty: 90 TABLET | Refills: 3 | Status: SHIPPED | OUTPATIENT
Start: 2019-10-01 | End: 2020-08-18

## 2019-11-18 ENCOUNTER — APPOINTMENT (OUTPATIENT)
Dept: LAB | Facility: CLINIC | Age: 79
End: 2019-11-18
Payer: MEDICARE

## 2019-11-18 DIAGNOSIS — E03.9 ACQUIRED HYPOTHYROIDISM: ICD-10-CM

## 2019-11-18 DIAGNOSIS — I10 HYPERTENSION, BENIGN: ICD-10-CM

## 2019-11-18 DIAGNOSIS — E55.9 VITAMIN D DEFICIENCY: ICD-10-CM

## 2019-11-18 LAB
25(OH)D3 SERPL-MCNC: 41.3 NG/ML (ref 30–100)
ALBUMIN SERPL BCP-MCNC: 3.8 G/DL (ref 3.5–5)
ALP SERPL-CCNC: 78 U/L (ref 46–116)
ALT SERPL W P-5'-P-CCNC: 28 U/L (ref 12–78)
ANION GAP SERPL CALCULATED.3IONS-SCNC: 9 MMOL/L (ref 4–13)
AST SERPL W P-5'-P-CCNC: 16 U/L (ref 5–45)
BASOPHILS # BLD AUTO: 0.07 THOUSANDS/ΜL (ref 0–0.1)
BASOPHILS NFR BLD AUTO: 1 % (ref 0–1)
BILIRUB SERPL-MCNC: 0.38 MG/DL (ref 0.2–1)
BUN SERPL-MCNC: 17 MG/DL (ref 5–25)
CALCIUM SERPL-MCNC: 9.2 MG/DL (ref 8.3–10.1)
CHLORIDE SERPL-SCNC: 105 MMOL/L (ref 100–108)
CHOLEST SERPL-MCNC: 195 MG/DL (ref 50–200)
CO2 SERPL-SCNC: 26 MMOL/L (ref 21–32)
CREAT SERPL-MCNC: 0.92 MG/DL (ref 0.6–1.3)
EOSINOPHIL # BLD AUTO: 0.17 THOUSAND/ΜL (ref 0–0.61)
EOSINOPHIL NFR BLD AUTO: 2 % (ref 0–6)
ERYTHROCYTE [DISTWIDTH] IN BLOOD BY AUTOMATED COUNT: 12.7 % (ref 11.6–15.1)
GFR SERPL CREATININE-BSD FRML MDRD: 60 ML/MIN/1.73SQ M
GLUCOSE P FAST SERPL-MCNC: 91 MG/DL (ref 65–99)
HCT VFR BLD AUTO: 44.9 % (ref 34.8–46.1)
HDLC SERPL-MCNC: 58 MG/DL
HGB BLD-MCNC: 14.4 G/DL (ref 11.5–15.4)
IMM GRANULOCYTES # BLD AUTO: 0.04 THOUSAND/UL (ref 0–0.2)
IMM GRANULOCYTES NFR BLD AUTO: 1 % (ref 0–2)
LDLC SERPL CALC-MCNC: 119 MG/DL (ref 0–100)
LYMPHOCYTES # BLD AUTO: 2.33 THOUSANDS/ΜL (ref 0.6–4.47)
LYMPHOCYTES NFR BLD AUTO: 34 % (ref 14–44)
MCH RBC QN AUTO: 31 PG (ref 26.8–34.3)
MCHC RBC AUTO-ENTMCNC: 32.1 G/DL (ref 31.4–37.4)
MCV RBC AUTO: 97 FL (ref 82–98)
MONOCYTES # BLD AUTO: 0.61 THOUSAND/ΜL (ref 0.17–1.22)
MONOCYTES NFR BLD AUTO: 9 % (ref 4–12)
NEUTROPHILS # BLD AUTO: 3.72 THOUSANDS/ΜL (ref 1.85–7.62)
NEUTS SEG NFR BLD AUTO: 53 % (ref 43–75)
NONHDLC SERPL-MCNC: 137 MG/DL
NRBC BLD AUTO-RTO: 0 /100 WBCS
PLATELET # BLD AUTO: 242 THOUSANDS/UL (ref 149–390)
PMV BLD AUTO: 10 FL (ref 8.9–12.7)
POTASSIUM SERPL-SCNC: 3.9 MMOL/L (ref 3.5–5.3)
PROT SERPL-MCNC: 7.5 G/DL (ref 6.4–8.2)
RBC # BLD AUTO: 4.64 MILLION/UL (ref 3.81–5.12)
SODIUM SERPL-SCNC: 140 MMOL/L (ref 136–145)
T4 FREE SERPL-MCNC: 1.17 NG/DL (ref 0.76–1.46)
TRIGL SERPL-MCNC: 92 MG/DL
TSH SERPL DL<=0.05 MIU/L-ACNC: 2.63 UIU/ML (ref 0.36–3.74)
WBC # BLD AUTO: 6.94 THOUSAND/UL (ref 4.31–10.16)

## 2019-11-18 PROCEDURE — 84443 ASSAY THYROID STIM HORMONE: CPT

## 2019-11-18 PROCEDURE — 80061 LIPID PANEL: CPT

## 2019-11-18 PROCEDURE — 80053 COMPREHEN METABOLIC PANEL: CPT

## 2019-11-18 PROCEDURE — 82306 VITAMIN D 25 HYDROXY: CPT

## 2019-11-18 PROCEDURE — 85025 COMPLETE CBC W/AUTO DIFF WBC: CPT

## 2019-11-18 PROCEDURE — 36415 COLL VENOUS BLD VENIPUNCTURE: CPT

## 2019-11-18 PROCEDURE — 84439 ASSAY OF FREE THYROXINE: CPT

## 2019-11-21 ENCOUNTER — OFFICE VISIT (OUTPATIENT)
Dept: INTERNAL MEDICINE CLINIC | Facility: CLINIC | Age: 79
End: 2019-11-21
Payer: MEDICARE

## 2019-11-21 VITALS
HEART RATE: 80 BPM | BODY MASS INDEX: 24.66 KG/M2 | WEIGHT: 134 LBS | HEIGHT: 62 IN | OXYGEN SATURATION: 98 % | RESPIRATION RATE: 18 BRPM | SYSTOLIC BLOOD PRESSURE: 134 MMHG | DIASTOLIC BLOOD PRESSURE: 80 MMHG

## 2019-11-21 DIAGNOSIS — Z78.0 POSTMENOPAUSAL: ICD-10-CM

## 2019-11-21 DIAGNOSIS — I10 HYPERTENSION, BENIGN: ICD-10-CM

## 2019-11-21 DIAGNOSIS — E78.2 MIXED HYPERLIPIDEMIA: ICD-10-CM

## 2019-11-21 DIAGNOSIS — Z00.00 MEDICARE ANNUAL WELLNESS VISIT, SUBSEQUENT: Primary | ICD-10-CM

## 2019-11-21 DIAGNOSIS — E55.9 VITAMIN D DEFICIENCY: ICD-10-CM

## 2019-11-21 DIAGNOSIS — E03.9 ACQUIRED HYPOTHYROIDISM: ICD-10-CM

## 2019-11-21 PROCEDURE — 99214 OFFICE O/P EST MOD 30 MIN: CPT | Performed by: INTERNAL MEDICINE

## 2019-11-21 PROCEDURE — G0439 PPPS, SUBSEQ VISIT: HCPCS | Performed by: INTERNAL MEDICINE

## 2019-11-21 NOTE — PROGRESS NOTES
Assessment/Plan:     Chronic problems are stable  Continue present medications  Continue diet and exercise  Ordered labs for next visit  Quality Measures:       Return in about 6 months (around 5/21/2020)  No problem-specific Assessment & Plan notes found for this encounter  Diagnoses and all orders for this visit:    Medicare annual wellness visit, subsequent    Hypertension, benign  -     CBC and differential; Future  -     Comprehensive metabolic panel; Future  -     Lipid panel; Future    Acquired hypothyroidism  -     T4, free; Future  -     TSH, 3rd generation; Future    Mixed hyperlipidemia    Vitamin D deficiency  -     Vitamin D 25 hydroxy; Future    Postmenopausal  -     DXA bone density spine hip and pelvis; Future          Subjective:      Patient ID: Harvinder Carvajal is a 70 y o  female  Patient comes in today for routine follow-up and Medicare wellness  She states she is doing okay with no new complaints  Her blood pressure is controlled  Thyroid levels are controlled  She is trying to watch her diet  Cholesterol is still a little high but she refuses to go on the medication  Vitamin-D is doing better  Still enjoying senior living  Again, reports no new complaints today  No further additions to her history        ALLERGIES:  Allergies   Allergen Reactions    Sulfa Antibiotics Hives       CURRENT MEDICATIONS:    Current Outpatient Medications:     fluticasone (FLONASE) 50 mcg/act nasal spray, 1 spray into each nostril daily, Disp: 16 g, Rfl: 3    levothyroxine 75 mcg tablet, TAKE 1 TABLET BY MOUTH  DAILY, Disp: 90 tablet, Rfl: 1    lisinopril (ZESTRIL) 5 mg tablet, TAKE 1 TABLET BY MOUTH  DAILY, Disp: 90 tablet, Rfl: 3    ACTIVE PROBLEM LIST:  Patient Active Problem List   Diagnosis    Allergic rhinitis    Hyperlipidemia    Hypertension, benign    Hypothyroidism    Vitamin D deficiency       PAST MEDICAL HISTORY:  Past Medical History:   Diagnosis Date    Lumbar stenosis     Transient global amnesia        PAST SURGICAL HISTORY:  No past surgical history on file  FAMILY HISTORY:  Family History   Problem Relation Age of Onset    Stroke Mother 61        CVA    Pancreatic cancer Father     Hyperlipidemia Sister        SOCIAL HISTORY:  Social History     Socioeconomic History    Marital status: /Civil Union     Spouse name: Not on file    Number of children: 3    Years of education: Not on file    Highest education level: Not on file   Occupational History    Occupation: [employer] office     Comment: Full time employment   Social Needs    Financial resource strain: Not on file    Food insecurity:     Worry: Not on file     Inability: Not on file   [employer] needs:     Medical: Not on file     Non-medical: Not on file   Tobacco Use    Smoking status: Former Smoker    Smokeless tobacco: Never Used   Substance and Sexual Activity    Alcohol use: Yes     Comment: glass white wine with dinner    Drug use: No    Sexual activity: Not on file   Lifestyle    Physical activity:     Days per week: Not on file     Minutes per session: Not on file    Stress: Not on file   Relationships    Social connections:     Talks on phone: Not on file     Gets together: Not on file     Attends Restorationism service: Not on file     Active member of club or organization: Not on file     Attends meetings of clubs or organizations: Not on file     Relationship status: Not on file    Intimate partner violence:     Fear of current or ex partner: Not on file     Emotionally abused: Not on file     Physically abused: Not on file     Forced sexual activity: Not on file   Other Topics Concern    Not on file   Social History Narrative    Not on file       Review of Systems   Respiratory: Negative for shortness of breath  Cardiovascular: Negative for chest pain  Gastrointestinal: Negative for abdominal pain           Objective:  Vitals:    11/21/19 1031   BP: 134/80   BP Location: Left arm   Patient Position: Sitting   Cuff Size: Adult   Pulse: 80   Resp: 18   SpO2: 98%   Weight: 60 8 kg (134 lb)   Height: 5' 2" (1 575 m)     Body mass index is 24 51 kg/m²  Physical Exam   Constitutional: She is oriented to person, place, and time  She appears well-developed and well-nourished  Cardiovascular: Normal rate, regular rhythm and normal heart sounds  Pulmonary/Chest: Effort normal and breath sounds normal    Abdominal: Soft  There is no tenderness  Neurological: She is alert and oriented to person, place, and time  Nursing note and vitals reviewed          RESULTS:    Recent Results (from the past 1008 hour(s))   CBC and differential    Collection Time: 11/18/19 10:30 AM   Result Value Ref Range    WBC 6 94 4 31 - 10 16 Thousand/uL    RBC 4 64 3 81 - 5 12 Million/uL    Hemoglobin 14 4 11 5 - 15 4 g/dL    Hematocrit 44 9 34 8 - 46 1 %    MCV 97 82 - 98 fL    MCH 31 0 26 8 - 34 3 pg    MCHC 32 1 31 4 - 37 4 g/dL    RDW 12 7 11 6 - 15 1 %    MPV 10 0 8 9 - 12 7 fL    Platelets 169 999 - 492 Thousands/uL    nRBC 0 /100 WBCs    Neutrophils Relative 53 43 - 75 %    Immat GRANS % 1 0 - 2 %    Lymphocytes Relative 34 14 - 44 %    Monocytes Relative 9 4 - 12 %    Eosinophils Relative 2 0 - 6 %    Basophils Relative 1 0 - 1 %    Neutrophils Absolute 3 72 1 85 - 7 62 Thousands/µL    Immature Grans Absolute 0 04 0 00 - 0 20 Thousand/uL    Lymphocytes Absolute 2 33 0 60 - 4 47 Thousands/µL    Monocytes Absolute 0 61 0 17 - 1 22 Thousand/µL    Eosinophils Absolute 0 17 0 00 - 0 61 Thousand/µL    Basophils Absolute 0 07 0 00 - 0 10 Thousands/µL   Comprehensive metabolic panel    Collection Time: 11/18/19 10:30 AM   Result Value Ref Range    Sodium 140 136 - 145 mmol/L    Potassium 3 9 3 5 - 5 3 mmol/L    Chloride 105 100 - 108 mmol/L    CO2 26 21 - 32 mmol/L    ANION GAP 9 4 - 13 mmol/L    BUN 17 5 - 25 mg/dL    Creatinine 0 92 0 60 - 1 30 mg/dL    Glucose, Fasting 91 65 - 99 mg/dL Calcium 9 2 8 3 - 10 1 mg/dL    AST 16 5 - 45 U/L    ALT 28 12 - 78 U/L    Alkaline Phosphatase 78 46 - 116 U/L    Total Protein 7 5 6 4 - 8 2 g/dL    Albumin 3 8 3 5 - 5 0 g/dL    Total Bilirubin 0 38 0 20 - 1 00 mg/dL    eGFR 60 ml/min/1 73sq m   Lipid panel    Collection Time: 11/18/19 10:30 AM   Result Value Ref Range    Cholesterol 195 50 - 200 mg/dL    Triglycerides 92 <=150 mg/dL    HDL, Direct 58 >=40 mg/dL    LDL Calculated 119 (H) 0 - 100 mg/dL    Non-HDL-Chol (CHOL-HDL) 137 mg/dl   T4, free    Collection Time: 11/18/19 10:30 AM   Result Value Ref Range    Free T4 1 17 0 76 - 1 46 ng/dL   TSH, 3rd generation    Collection Time: 11/18/19 10:30 AM   Result Value Ref Range    TSH 3RD GENERATON 2 630 0 358 - 3 740 uIU/mL   Vitamin D 25 hydroxy    Collection Time: 11/18/19 10:30 AM   Result Value Ref Range    Vit D, 25-Hydroxy 41 3 30 0 - 100 0 ng/mL       This note was created with voice recognition software  Phonic, grammatical and spelling errors may be present within the note as a result

## 2019-11-21 NOTE — PATIENT INSTRUCTIONS
Medicare Preventive Visit Patient Instructions  Thank you for completing your Welcome to Medicare Visit or Medicare Annual Wellness Visit today  Your next wellness visit will be due in one year (11/21/2020)  The screening/preventive services that you may require over the next 5-10 years are detailed below  Some tests may not apply to you based off risk factors and/or age  Screening tests ordered at today's visit but not completed yet may show as past due  Also, please note that scanned in results may not display below  Preventive Screenings:  Service Recommendations Previous Testing/Comments   Colorectal Cancer Screening  * Colonoscopy    * Fecal Occult Blood Test (FOBT)/Fecal Immunochemical Test (FIT)  * Fecal DNA/Cologuard Test  * Flexible Sigmoidoscopy Age: 54-65 years old   Colonoscopy: every 10 years (may be performed more frequently if at higher risk)  OR  FOBT/FIT: every 1 year  OR  Cologuard: every 3 years  OR  Sigmoidoscopy: every 5 years  Screening may be recommended earlier than age 48 if at higher risk for colorectal cancer  Also, an individualized decision between you and your healthcare provider will decide whether screening between the ages of 74-80 would be appropriate  Colonoscopy: Not on file  FOBT/FIT: Not on file  Cologuard: 01/07/2018  Sigmoidoscopy: Not on file         Breast Cancer Screening Age: 36 years old  Frequency: every 1-2 years  Not required if history of left and right mastectomy Mammogram: Not on file       Cervical Cancer Screening Between the ages of 21-29, pap smear recommended once every 3 years  Between the ages of 33-67, can perform pap smear with HPV co-testing every 5 years     Recommendations may differ for women with a history of total hysterectomy, cervical cancer, or abnormal pap smears in past  Pap Smear: Not on file    Screening Not Indicated   Hepatitis C Screening Once for adults born between [facility]  More frequently in patients at high risk for Hepatitis C Hep C Antibody: Not on file       Diabetes Screening 1-2 times per year if you're at risk for diabetes or have pre-diabetes Fasting glucose: 91 mg/dL   A1C: No results in last 5 years    Screening Current   Cholesterol Screening Once every 5 years if you don't have a lipid disorder  May order more often based on risk factors  Lipid panel: 11/18/2019    Screening Not Indicated  History Lipid Disorder     Other Preventive Screenings Covered by Medicare:  1  Abdominal Aortic Aneurysm (AAA) Screening: covered once if your at risk  You're considered to be at risk if you have a family history of AAA  2  Lung Cancer Screening: covers low dose CT scan once per year if you meet all of the following conditions: (1) Age 50-69; (2) No signs or symptoms of lung cancer; (3) Current smoker or have quit smoking within the last 15 years; (4) You have a tobacco smoking history of at least 30 pack years (packs per day multiplied by number of years you smoked); (5) You get a written order from a healthcare provider  3  Glaucoma Screening: covered annually if you're considered high risk: (1) You have diabetes OR (2) Family history of glaucoma OR (3)  aged 48 and older OR (3)  American aged 72 and older  3  Osteoporosis Screening: covered every 2 years if you meet one of the following conditions: (1) You're estrogen deficient and at risk for osteoporosis based off medical history and other findings; (2) Have a vertebral abnormality; (3) On glucocorticoid therapy for more than 3 months; (4) Have primary hyperparathyroidism; (5) On osteoporosis medications and need to assess response to drug therapy  · Last bone density test (DXA Scan): Not on file  5  HIV Screening: covered annually if you're between the age of 12-76  Also covered annually if you are younger than 13 and older than 72 with risk factors for HIV infection   For pregnant patients, it is covered up to 3 times per pregnancy  Immunizations:  Immunization Recommendations   Influenza Vaccine Annual influenza vaccination during flu season is recommended for all persons aged >= 6 months who do not have contraindications   Pneumococcal Vaccine (Prevnar and Pneumovax)  * Prevnar = PCV13  * Pneumovax = PPSV23   Adults 25-60 years old: 1-3 doses may be recommended based on certain risk factors  Adults 72 years old: Prevnar (PCV13) vaccine recommended followed by Pneumovax (PPSV23) vaccine  If already received PPSV23 since turning 65, then PCV13 recommended at least one year after PPSV23 dose  Hepatitis B Vaccine 3 dose series if at intermediate or high risk (ex: diabetes, end stage renal disease, liver disease)   Tetanus (Td) Vaccine - COST NOT COVERED BY MEDICARE PART B Following completion of primary series, a booster dose should be given every 10 years to maintain immunity against tetanus  Td may also be given as tetanus wound prophylaxis  Tdap Vaccine - COST NOT COVERED BY MEDICARE PART B Recommended at least once for all adults  For pregnant patients, recommended with each pregnancy  Shingles Vaccine (Shingrix) - COST NOT COVERED BY MEDICARE PART B  2 shot series recommended in those aged 48 and above     Health Maintenance Due:  There are no preventive care reminders to display for this patient  Immunizations Due:      Topic Date Due    DTaP,Tdap,and Td Vaccines (1 - Tdap) 01/31/1951    Pneumococcal Vaccine: 65+ Years (1 of 2 - PCV13) 01/31/2007    Influenza Vaccine  07/01/2019     Advance Directives   What are advance directives? Advance directives are legal documents that state your wishes and plans for medical care  These plans are made ahead of time in case you lose your ability to make decisions for yourself  Advance directives can apply to any medical decision, such as the treatments you want, and if you want to donate organs  What are the types of advance directives?   There are many types of advance directives, and each state has rules about how to use them  You may choose a combination of any of the following:  · Living will: This is a written record of the treatment you want  You can also choose which treatments you do not want, which to limit, and which to stop at a certain time  This includes surgery, medicine, IV fluid, and tube feedings  · Durable power of  for healthcare [facility]): This is a written record that states who you want to make healthcare choices for you when you are unable to make them for yourself  This person, called a proxy, is usually a family member or a friend  You may choose more than 1 proxy  · Do not resuscitate (DNR) order:  A DNR order is used in case your heart stops beating or you stop breathing  It is a request not to have certain forms of treatment, such as CPR  A DNR order may be included in other types of advance directives  · Medical directive: This covers the care that you want if you are in a coma, near death, or unable to make decisions for yourself  You can list the treatments you want for each condition  Treatment may include pain medicine, surgery, blood transfusions, dialysis, IV or tube feedings, and a ventilator (breathing machine)  · Values history: This document has questions about your views, beliefs, and how you feel and think about life  This information can help others choose the care that you would choose  Why are advance directives important? An advance directive helps you control your care  Although spoken wishes may be used, it is better to have your wishes written down  Spoken wishes can be misunderstood, or not followed  Treatments may be given even if you do not want them  An advance directive may make it easier for your family to make difficult choices about your care  Urinary Incontinence   Urinary incontinence (UI)  is when you lose control of your bladder  UI develops because your bladder cannot store or empty urine properly   The 3 most common types of UI are stress incontinence, urge incontinence, or both  Medicines:   · May be given to help strengthen your bladder control  Report any side effects of medication to your healthcare provider  Do pelvic muscle exercises often:  Your pelvic muscles help you stop urinating  Squeeze these muscles tight for 5 seconds, then relax for 5 seconds  Gradually work up to squeezing for 10 seconds  Do 3 sets of 15 repetitions a day, or as directed  This will help strengthen your pelvic muscles and improve bladder control  Train your bladder:  Go to the bathroom at set times, such as every 2 hours, even if you do not feel the urge to go  You can also try to hold your urine when you feel the urge to go  For example, hold your urine for 5 minutes when you feel the urge to go  As that becomes easier, hold your urine for 10 minutes  Self-care:   · Keep a UI record  Write down how often you leak urine and how much you leak  Make a note of what you were doing when you leaked urine  · Drink liquids as directed  You may need to limit the amount of liquid you drink to help control your urine leakage  Do not drink any liquid right before you go to bed  Limit or do not have drinks that contain caffeine or alcohol  · Prevent constipation  Eat a variety of high-fiber foods  Good examples are high-fiber cereals, beans, vegetables, and whole-grain breads  Walking is the best way to trigger your intestines to have a bowel movement  · Exercise regularly and maintain a healthy weight  Weight loss and exercise will decrease pressure on your bladder and help you control your leakage  · Use a catheter as directed  to help empty your bladder  A catheter is a tiny, plastic tube that is put into your bladder to drain your urine  · Go to behavior therapy as directed  Behavior therapy may be used to help you learn to control your urge to urinate         © Copyright [employer] 2018 Information is for End User's use only and may not be sold, redistributed or otherwise used for commercial purposes  All illustrations and images included in CareNotes® are the copyrighted property of [employer]  or [facility] Preventive Visit Patient Instructions  Thank you for completing your Welcome to Medicare Visit or Medicare Annual Wellness Visit today  Your next wellness visit will be due in one year (11/21/2020)  The screening/preventive services that you may require over the next 5-10 years are detailed below  Some tests may not apply to you based off risk factors and/or age  Screening tests ordered at today's visit but not completed yet may show as past due  Also, please note that scanned in results may not display below  Preventive Screenings:  Service Recommendations Previous Testing/Comments   Colorectal Cancer Screening  * Colonoscopy    * Fecal Occult Blood Test (FOBT)/Fecal Immunochemical Test (FIT)  * Fecal DNA/Cologuard Test  * Flexible Sigmoidoscopy Age: 54-65 years old   Colonoscopy: every 10 years (may be performed more frequently if at higher risk)  OR  FOBT/FIT: every 1 year  OR  Cologuard: every 3 years  OR  Sigmoidoscopy: every 5 years  Screening may be recommended earlier than age 48 if at higher risk for colorectal cancer  Also, an individualized decision between you and your healthcare provider will decide whether screening between the ages of 74-80 would be appropriate  Colonoscopy: Not on file  FOBT/FIT: Not on file  Cologuard: 01/07/2018  Sigmoidoscopy: Not on file         Breast Cancer Screening Age: 36 years old  Frequency: every 1-2 years  Not required if history of left and right mastectomy Mammogram: Not on file       Cervical Cancer Screening Between the ages of 21-29, pap smear recommended once every 3 years  Between the ages of 33-67, can perform pap smear with HPV co-testing every 5 years     Recommendations may differ for women with a history of total hysterectomy, cervical cancer, or abnormal pap smears in past  Pap Smear: Not on file    Screening Not Indicated   Hepatitis C Screening Once for adults born between 1945 and 1965  More frequently in patients at high risk for Hepatitis C Hep C Antibody: Not on file       Diabetes Screening 1-2 times per year if you're at risk for diabetes or have pre-diabetes Fasting glucose: 91 mg/dL   A1C: No results in last 5 years    Screening Current   Cholesterol Screening Once every 5 years if you don't have a lipid disorder  May order more often based on risk factors  Lipid panel: 11/18/2019    Screening Not Indicated  History Lipid Disorder     Other Preventive Screenings Covered by Medicare:  6  Abdominal Aortic Aneurysm (AAA) Screening: covered once if your at risk  You're considered to be at risk if you have a family history of AAA  7  Lung Cancer Screening: covers low dose CT scan once per year if you meet all of the following conditions: (1) Age 50-69; (2) No signs or symptoms of lung cancer; (3) Current smoker or have quit smoking within the last 15 years; (4) You have a tobacco smoking history of at least 30 pack years (packs per day multiplied by number of years you smoked); (5) You get a written order from a healthcare provider  8  Glaucoma Screening: covered annually if you're considered high risk: (1) You have diabetes OR (2) Family history of glaucoma OR (3)  aged 48 and older OR (3)  American aged 72 and older  5  Osteoporosis Screening: covered every 2 years if you meet one of the following conditions: (1) You're estrogen deficient and at risk for osteoporosis based off medical history and other findings; (2) Have a vertebral abnormality; (3) On glucocorticoid therapy for more than 3 months; (4) Have primary hyperparathyroidism; (5) On osteoporosis medications and need to assess response to drug therapy  · Last bone density test (DXA Scan): Not on file    10  HIV Screening: covered annually if you're between the age of 12-76  Also covered annually if you are younger than 13 and older than 72 with risk factors for HIV infection  For pregnant patients, it is covered up to 3 times per pregnancy  Immunizations:  Immunization Recommendations   Influenza Vaccine Annual influenza vaccination during flu season is recommended for all persons aged >= 6 months who do not have contraindications   Pneumococcal Vaccine (Prevnar and Pneumovax)  * Prevnar = PCV13  * Pneumovax = PPSV23   Adults 25-60 years old: 1-3 doses may be recommended based on certain risk factors  Adults 72 years old: Prevnar (PCV13) vaccine recommended followed by Pneumovax (PPSV23) vaccine  If already received PPSV23 since turning 65, then PCV13 recommended at least one year after PPSV23 dose  Hepatitis B Vaccine 3 dose series if at intermediate or high risk (ex: diabetes, end stage renal disease, liver disease)   Tetanus (Td) Vaccine - COST NOT COVERED BY MEDICARE PART B Following completion of primary series, a booster dose should be given every 10 years to maintain immunity against tetanus  Td may also be given as tetanus wound prophylaxis  Tdap Vaccine - COST NOT COVERED BY MEDICARE PART B Recommended at least once for all adults  For pregnant patients, recommended with each pregnancy  Shingles Vaccine (Shingrix) - COST NOT COVERED BY MEDICARE PART B  2 shot series recommended in those aged 48 and above     Health Maintenance Due:  There are no preventive care reminders to display for this patient  Immunizations Due:      Topic Date Due    DTaP,Tdap,and Td Vaccines (1 - Tdap) 01/31/1951    Pneumococcal Vaccine: 65+ Years (1 of 2 - PCV13) 01/31/2007     Advance Directives   What are advance directives? Advance directives are legal documents that state your wishes and plans for medical care  These plans are made ahead of time in case you lose your ability to make decisions for yourself   Advance directives can apply to any medical decision, such as the treatments you want, and if you want to donate organs  What are the types of advance directives? There are many types of advance directives, and each state has rules about how to use them  You may choose a combination of any of the following:  · Living will: This is a written record of the treatment you want  You can also choose which treatments you do not want, which to limit, and which to stop at a certain time  This includes surgery, medicine, IV fluid, and tube feedings  · Durable power of  for healthcare [facility]): This is a written record that states who you want to make healthcare choices for you when you are unable to make them for yourself  This person, called a proxy, is usually a family member or a friend  You may choose more than 1 proxy  · Do not resuscitate (DNR) order:  A DNR order is used in case your heart stops beating or you stop breathing  It is a request not to have certain forms of treatment, such as CPR  A DNR order may be included in other types of advance directives  · Medical directive: This covers the care that you want if you are in a coma, near death, or unable to make decisions for yourself  You can list the treatments you want for each condition  Treatment may include pain medicine, surgery, blood transfusions, dialysis, IV or tube feedings, and a ventilator (breathing machine)  · Values history: This document has questions about your views, beliefs, and how you feel and think about life  This information can help others choose the care that you would choose  Why are advance directives important? An advance directive helps you control your care  Although spoken wishes may be used, it is better to have your wishes written down  Spoken wishes can be misunderstood, or not followed  Treatments may be given even if you do not want them  An advance directive may make it easier for your family to make difficult choices about your care     Urinary Incontinence   Urinary incontinence (UI)  is when you lose control of your bladder  UI develops because your bladder cannot store or empty urine properly  The 3 most common types of UI are stress incontinence, urge incontinence, or both  Medicines:   · May be given to help strengthen your bladder control  Report any side effects of medication to your healthcare provider  Do pelvic muscle exercises often:  Your pelvic muscles help you stop urinating  Squeeze these muscles tight for 5 seconds, then relax for 5 seconds  Gradually work up to squeezing for 10 seconds  Do 3 sets of 15 repetitions a day, or as directed  This will help strengthen your pelvic muscles and improve bladder control  Train your bladder:  Go to the bathroom at set times, such as every 2 hours, even if you do not feel the urge to go  You can also try to hold your urine when you feel the urge to go  For example, hold your urine for 5 minutes when you feel the urge to go  As that becomes easier, hold your urine for 10 minutes  Self-care:   · Keep a UI record  Write down how often you leak urine and how much you leak  Make a note of what you were doing when you leaked urine  · Drink liquids as directed  You may need to limit the amount of liquid you drink to help control your urine leakage  Do not drink any liquid right before you go to bed  Limit or do not have drinks that contain caffeine or alcohol  · Prevent constipation  Eat a variety of high-fiber foods  Good examples are high-fiber cereals, beans, vegetables, and whole-grain breads  Walking is the best way to trigger your intestines to have a bowel movement  · Exercise regularly and maintain a healthy weight  Weight loss and exercise will decrease pressure on your bladder and help you control your leakage  · Use a catheter as directed  to help empty your bladder  A catheter is a tiny, plastic tube that is put into your bladder to drain your urine     · Go to behavior therapy as directed  Behavior therapy may be used to help you learn to control your urge to urinate  © Copyright [employer] 2018 Information is for End User's use only and may not be sold, redistributed or otherwise used for commercial purposes   All illustrations and images included in CareNotes® are the copyrighted property of A D A M [employer]  or [address] [place] [address]

## 2019-11-21 NOTE — PROGRESS NOTES
Assessment and Plan:     Problem List Items Addressed This Visit     None        BMI Counseling: Body mass index is 24 51 kg/m²  The BMI is above normal  Nutrition recommendations include moderation in carbohydrate intake  Preventive health issues were discussed with patient, and age appropriate screening tests were ordered as noted in patient's After Visit Summary  Personalized health advice and appropriate referrals for health education or preventive services given if needed, as noted in patient's After Visit Summary  History of Present Illness:     Patient presents for Welcome to Medicare visit  Patient Care Team:  Sharda Hogan MD as PCP - General     Review of Systems:     Review of Systems   Respiratory: Negative for shortness of breath  Cardiovascular: Negative for chest pain  Gastrointestinal: Negative for abdominal pain  Problem List:     Patient Active Problem List   Diagnosis    Allergic rhinitis    Hyperlipidemia    Hypertension, benign    Hypothyroidism    Vitamin D deficiency      Past Medical and Surgical History:     Past Medical History:   Diagnosis Date    Lumbar stenosis     Transient global amnesia      No past surgical history on file     Family History:     Family History   Problem Relation Age of Onset    Stroke Mother 61        CVA    Pancreatic cancer Father     Hyperlipidemia Sister       Social History:     Social History     Socioeconomic History    Marital status: /Civil Union     Spouse name: Not on file    Number of children: 3    Years of education: Not on file    Highest education level: Not on file   Occupational History    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     Comment: Full time employment   Social Needs    Financial resource strain: Not on file    Food insecurity:     Worry: Not on file     Inability: Not on file    Transportation needs:     Medical: Not on file     Non-medical: Not on file   Tobacco Use    Smoking status: Former Smoker    Smokeless tobacco: Never Used   Substance and Sexual Activity    Alcohol use: Yes     Comment: glass white wine with dinner    Drug use: No    Sexual activity: Not on file   Lifestyle    Physical activity:     Days per week: Not on file     Minutes per session: Not on file    Stress: Not on file   Relationships    Social connections:     Talks on phone: Not on file     Gets together: Not on file     Attends Druze service: Not on file     Active member of club or organization: Not on file     Attends meetings of clubs or organizations: Not on file     Relationship status: Not on file    Intimate partner violence:     Fear of current or ex partner: Not on file     Emotionally abused: Not on file     Physically abused: Not on file     Forced sexual activity: Not on file   Other Topics Concern    Not on file   Social History Narrative    Not on file      Medications and Allergies:     Current Outpatient Medications   Medication Sig Dispense Refill    aspirin 81 MG tablet Take 1 tablet by mouth daily      fluticasone (FLONASE) 50 mcg/act nasal spray 1 spray into each nostril daily 16 g 3    levothyroxine 75 mcg tablet TAKE 1 TABLET BY MOUTH  DAILY 90 tablet 1    lisinopril (ZESTRIL) 5 mg tablet TAKE 1 TABLET BY MOUTH  DAILY 90 tablet 3     No current facility-administered medications for this visit  Allergies   Allergen Reactions    Sulfa Antibiotics Hives      Immunizations: There is no immunization history for the selected administration types on file for this patient  Health Maintenance: There are no preventive care reminders to display for this patient  Topic Date Due    DTaP,Tdap,and Td Vaccines (1 - Tdap) 01/31/1951    Pneumococcal Vaccine: 65+ Years (1 of 2 - PCV13) 01/31/2007    Influenza Vaccine  07/01/2019      Medicare Screening Tests and Risk Assessments:     Sam Portillo is here for her Subsequent Wellness visit   Last Medicare Wellness visit information reviewed, patient interviewed, no change since last AWV  Health Risk Assessment:   Patient rates overall health as good  Patient feels that their physical health rating is same  Eyesight was rated as slightly worse  Hearing was rated as same  Patient feels that their emotional and mental health rating is same  Pain experienced in the last 7 days has been none  Patient states that she has experienced no weight loss or gain in last 6 months  Depression Screening:   PHQ-2 Score: 0      Fall Risk Screening: In the past year, patient has experienced: no history of falling in past year      Urinary Incontinence Screening:   Patient has leaked urine accidently in the last six months  Declines any treatment    Home Safety:  Patient does not have trouble with stairs inside or outside of their home  Patient has working smoke alarms and has no working carbon monoxide detector  Home safety hazards include: none  Nutrition:   Current diet is Regular  Medications:   Patient is currently taking over-the-counter supplements  OTC medications include: see medication list  Patient is able to manage medications  Activities of Daily Living (ADLs)/Instrumental Activities of Daily Living (IADLs):   Walk and transfer into and out of bed and chair?: Yes  Dress and groom yourself?: Yes    Bathe or shower yourself?: Yes    Feed yourself?  Yes  Do your laundry/housekeeping?: Yes  Manage your money, pay your bills and track your expenses?: Yes  Make your own meals?: Yes    Do your own shopping?: Yes    Advance Care Planning:   Living will: No    Advanced directive counseling given: Yes      Cognitive Screening:   Provider or family/friend/caregiver concerned regarding cognition?: No    PREVENTIVE SCREENINGS      Cardiovascular Screening:    General: Screening Not Indicated and History Lipid Disorder      Diabetes Screening:     General: Screening Current      Breast Cancer Screening:     General: Screening Not Indicated Cervical Cancer Screening:    General: Screening Not Indicated      Abdominal Aortic Aneurysm (AAA) Screening:        General: Screening Not Indicated      Lung Cancer Screening:     General: Screening Not Indicated      Hepatitis C Screening:    General: Screening Not Indicated    No exam data present     Physical Exam:     There were no vitals taken for this visit      Physical Exam    Je Jensen MD

## 2020-01-14 ENCOUNTER — TELEPHONE (OUTPATIENT)
Dept: GASTROENTEROLOGY | Facility: CLINIC | Age: 80
End: 2020-01-14

## 2020-01-14 NOTE — TELEPHONE ENCOUNTER
Nancy Harp - Patient called lmom would like to speak with Nancy Harp   Please call [phone] ty

## 2020-02-26 DIAGNOSIS — E03.9 HYPOTHYROIDISM, UNSPECIFIED TYPE: ICD-10-CM

## 2020-02-26 RX ORDER — LEVOTHYROXINE SODIUM 0.07 MG/1
TABLET ORAL DAILY
Qty: 90 TABLET | Refills: 1 | Status: SHIPPED | OUTPATIENT
Start: 2020-02-26 | End: 2020-08-18

## 2020-08-18 DIAGNOSIS — I10 ESSENTIAL HYPERTENSION: ICD-10-CM

## 2020-08-18 DIAGNOSIS — E03.9 HYPOTHYROIDISM, UNSPECIFIED TYPE: ICD-10-CM

## 2020-08-18 RX ORDER — LISINOPRIL 5 MG/1
TABLET ORAL
Qty: 90 TABLET | Refills: 3 | Status: SHIPPED | OUTPATIENT
Start: 2020-08-18 | End: 2020-10-06 | Stop reason: SDUPTHER

## 2020-08-18 RX ORDER — LEVOTHYROXINE SODIUM 0.07 MG/1
TABLET ORAL DAILY
Qty: 90 TABLET | Refills: 3 | Status: SHIPPED | OUTPATIENT
Start: 2020-08-18 | End: 2020-10-06 | Stop reason: SDUPTHER

## 2020-08-19 ENCOUNTER — OFFICE VISIT (OUTPATIENT)
Dept: INTERNAL MEDICINE CLINIC | Facility: CLINIC | Age: 80
End: 2020-08-19
Payer: MEDICARE

## 2020-08-19 VITALS
BODY MASS INDEX: 24.11 KG/M2 | HEART RATE: 70 BPM | HEIGHT: 62 IN | TEMPERATURE: 98.8 F | OXYGEN SATURATION: 99 % | RESPIRATION RATE: 16 BRPM | WEIGHT: 131 LBS | SYSTOLIC BLOOD PRESSURE: 122 MMHG | DIASTOLIC BLOOD PRESSURE: 70 MMHG

## 2020-08-19 DIAGNOSIS — J30.9 ALLERGIC SINUSITIS: ICD-10-CM

## 2020-08-19 DIAGNOSIS — F41.9 ANXIETY: Primary | ICD-10-CM

## 2020-08-19 PROCEDURE — 1036F TOBACCO NON-USER: CPT | Performed by: INTERNAL MEDICINE

## 2020-08-19 PROCEDURE — 1160F RVW MEDS BY RX/DR IN RCRD: CPT | Performed by: INTERNAL MEDICINE

## 2020-08-19 PROCEDURE — 99214 OFFICE O/P EST MOD 30 MIN: CPT | Performed by: INTERNAL MEDICINE

## 2020-08-19 PROCEDURE — 3078F DIAST BP <80 MM HG: CPT | Performed by: INTERNAL MEDICINE

## 2020-08-19 PROCEDURE — 3008F BODY MASS INDEX DOCD: CPT | Performed by: INTERNAL MEDICINE

## 2020-08-19 PROCEDURE — 3074F SYST BP LT 130 MM HG: CPT | Performed by: INTERNAL MEDICINE

## 2020-08-19 RX ORDER — BUSPIRONE HYDROCHLORIDE 5 MG/1
5 TABLET ORAL 2 TIMES DAILY
Qty: 60 TABLET | Refills: 3 | Status: SHIPPED | OUTPATIENT
Start: 2020-08-19 | End: 2020-12-07 | Stop reason: SDUPTHER

## 2020-08-19 RX ORDER — BIMATOPROST 0.01 %
DROPS OPHTHALMIC (EYE)
COMMUNITY
Start: 2020-06-05

## 2020-08-19 NOTE — PROGRESS NOTES
Assessment/Plan:     Since she does not want to try fluoxetine or similar,  will try BuSpar  Reviewed the medication with her  She will start with 1 tablet for a week and then start the twice a day  For her allergies, suggested resuming the Flonase daily for at least 2 weeks along with the Claritin or Allegra and see if that improves her symptoms  Total time, 25 minutes, greater than 50% spent face to face in counseling and coordination of care  Quality Measures:       Return in about 3 weeks (around 9/9/2020)  No problem-specific Assessment & Plan notes found for this encounter  Diagnoses and all orders for this visit:    Anxiety  -     busPIRone (BUSPAR) 5 mg tablet; Take 1 tablet (5 mg total) by mouth 2 (two) times a day    Allergic sinusitis    Other orders  -     Lumigan 0 01 % ophthalmic drops          Subjective:      Patient ID: Rickey Gordon is a 68 y o  female  Patient comes in today because she is having trouble with anxiety, especially at home  She is under lot of stress and with the COVID pandemic on top of that, she is not coping well  She is having trouble sleeping  Anxious all the time  Arguing with her   She must be having a hard time because she hates medication and is asking to be put on something at this point  She also seems to be having more trouble with her allergies and sinuses  She is not complaining of sinus congestion just off balance a little bit  Because she has no congestion she has not been using her nasal spray    She tried Claritin but that does not seem to help      ALLERGIES:  Allergies   Allergen Reactions    Sulfa Antibiotics Hives       CURRENT MEDICATIONS:    Current Outpatient Medications:     fluticasone (FLONASE) 50 mcg/act nasal spray, 1 spray into each nostril daily, Disp: 16 g, Rfl: 3    levothyroxine 75 mcg tablet, TAKE 1 TABLET BY MOUTH  DAILY, Disp: 90 tablet, Rfl: 3    lisinopril (ZESTRIL) 5 mg tablet, TAKE 1 TABLET BY MOUTH DAILY, Disp: 90 tablet, Rfl: 3    Lumigan 0 01 % ophthalmic drops, , Disp: , Rfl:     busPIRone (BUSPAR) 5 mg tablet, Take 1 tablet (5 mg total) by mouth 2 (two) times a day, Disp: 60 tablet, Rfl: 3    ACTIVE PROBLEM LIST:  Patient Active Problem List   Diagnosis    Allergic rhinitis    Hyperlipidemia    Hypertension, benign    Hypothyroidism    Vitamin D deficiency       PAST MEDICAL HISTORY:  Past Medical History:   Diagnosis Date    Lumbar stenosis     Transient global amnesia        PAST SURGICAL HISTORY:  History reviewed  No pertinent surgical history      FAMILY HISTORY:  Family History   Problem Relation Age of Onset    Stroke Mother 61        CVA    Pancreatic cancer Father     Hyperlipidemia Sister        SOCIAL HISTORY:  Social History     Socioeconomic History    Marital status: /Civil Union     Spouse name: Not on file    Number of children: 3    Years of education: Not on file    Highest education level: Not on file   Occupational History    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     Comment: Full time employment   Social Needs    Financial resource strain: Not on file    Food insecurity     Worry: Not on file     Inability: Not on file   [employer] needs     Medical: Not on file     Non-medical: Not on file   Tobacco Use    Smoking status: Former Smoker    Smokeless tobacco: Never Used   Substance and Sexual Activity    Alcohol use: Yes     Comment: glass white wine with dinner    Drug use: No    Sexual activity: Not on file   Lifestyle    Physical activity     Days per week: Not on file     Minutes per session: Not on file    Stress: Not on file   Relationships    Social connections     Talks on phone: Not on file     Gets together: Not on file     Attends Congregation service: Not on file     Active member of club or organization: Not on file     Attends meetings of clubs or organizations: Not on file     Relationship status: Not on file    Intimate partner violence Fear of current or ex partner: Not on file     Emotionally abused: Not on file     Physically abused: Not on file     Forced sexual activity: Not on file   Other Topics Concern    Not on file   Social History Narrative    Not on file       Review of Systems   Constitutional: Negative for fever  Respiratory: Negative for shortness of breath  Cardiovascular: Negative for chest pain  Gastrointestinal: Negative for abdominal pain  Objective:  Vitals:    08/19/20 0942   BP: 122/70   BP Location: Left arm   Patient Position: Sitting   Cuff Size: Standard   Pulse: 70   Resp: 16   Temp: 98 8 °F (37 1 °C)   TempSrc: Tympanic   SpO2: 99%   Weight: 59 4 kg (131 lb)   Height: 5' 2" (1 575 m)     Body mass index is 23 96 kg/m²  Physical Exam  Vitals signs and nursing note reviewed  Constitutional:       Appearance: She is well-developed  Cardiovascular:      Rate and Rhythm: Normal rate and regular rhythm  Heart sounds: Normal heart sounds  Pulmonary:      Effort: Pulmonary effort is normal       Breath sounds: Normal breath sounds  Abdominal:      Palpations: Abdomen is soft  Tenderness: There is no abdominal tenderness  Neurological:      Mental Status: She is alert and oriented to person, place, and time  RESULTS:    No results found for this or any previous visit (from the past 1008 hour(s))  This note was created with voice recognition software  Phonic, grammatical and spelling errors may be present within the note as a result

## 2020-09-16 ENCOUNTER — APPOINTMENT (OUTPATIENT)
Dept: LAB | Facility: CLINIC | Age: 80
End: 2020-09-16
Payer: MEDICARE

## 2020-09-16 DIAGNOSIS — I10 HYPERTENSION, BENIGN: ICD-10-CM

## 2020-09-16 DIAGNOSIS — E03.9 ACQUIRED HYPOTHYROIDISM: ICD-10-CM

## 2020-09-16 DIAGNOSIS — E55.9 VITAMIN D DEFICIENCY: ICD-10-CM

## 2020-09-16 LAB
25(OH)D3 SERPL-MCNC: 38.5 NG/ML (ref 30–100)
ALBUMIN SERPL BCP-MCNC: 3.6 G/DL (ref 3.5–5)
ALP SERPL-CCNC: 74 U/L (ref 46–116)
ALT SERPL W P-5'-P-CCNC: 26 U/L (ref 12–78)
ANION GAP SERPL CALCULATED.3IONS-SCNC: 8 MMOL/L (ref 4–13)
AST SERPL W P-5'-P-CCNC: 16 U/L (ref 5–45)
BASOPHILS # BLD AUTO: 0.08 THOUSANDS/ΜL (ref 0–0.1)
BASOPHILS NFR BLD AUTO: 1 % (ref 0–1)
BILIRUB SERPL-MCNC: 0.45 MG/DL (ref 0.2–1)
BUN SERPL-MCNC: 13 MG/DL (ref 5–25)
CALCIUM SERPL-MCNC: 9.3 MG/DL (ref 8.3–10.1)
CHLORIDE SERPL-SCNC: 106 MMOL/L (ref 100–108)
CHOLEST SERPL-MCNC: 196 MG/DL (ref 50–200)
CO2 SERPL-SCNC: 26 MMOL/L (ref 21–32)
CREAT SERPL-MCNC: 0.87 MG/DL (ref 0.6–1.3)
EOSINOPHIL # BLD AUTO: 0.15 THOUSAND/ΜL (ref 0–0.61)
EOSINOPHIL NFR BLD AUTO: 2 % (ref 0–6)
ERYTHROCYTE [DISTWIDTH] IN BLOOD BY AUTOMATED COUNT: 12.9 % (ref 11.6–15.1)
GFR SERPL CREATININE-BSD FRML MDRD: 64 ML/MIN/1.73SQ M
GLUCOSE P FAST SERPL-MCNC: 85 MG/DL (ref 65–99)
HCT VFR BLD AUTO: 44.1 % (ref 34.8–46.1)
HDLC SERPL-MCNC: 59 MG/DL
HGB BLD-MCNC: 14.5 G/DL (ref 11.5–15.4)
IMM GRANULOCYTES # BLD AUTO: 0.03 THOUSAND/UL (ref 0–0.2)
IMM GRANULOCYTES NFR BLD AUTO: 0 % (ref 0–2)
LDLC SERPL CALC-MCNC: 123 MG/DL (ref 0–100)
LYMPHOCYTES # BLD AUTO: 2.28 THOUSANDS/ΜL (ref 0.6–4.47)
LYMPHOCYTES NFR BLD AUTO: 33 % (ref 14–44)
MCH RBC QN AUTO: 31.6 PG (ref 26.8–34.3)
MCHC RBC AUTO-ENTMCNC: 32.9 G/DL (ref 31.4–37.4)
MCV RBC AUTO: 96 FL (ref 82–98)
MONOCYTES # BLD AUTO: 0.59 THOUSAND/ΜL (ref 0.17–1.22)
MONOCYTES NFR BLD AUTO: 9 % (ref 4–12)
NEUTROPHILS # BLD AUTO: 3.72 THOUSANDS/ΜL (ref 1.85–7.62)
NEUTS SEG NFR BLD AUTO: 55 % (ref 43–75)
NONHDLC SERPL-MCNC: 137 MG/DL
NRBC BLD AUTO-RTO: 0 /100 WBCS
PLATELET # BLD AUTO: 254 THOUSANDS/UL (ref 149–390)
PMV BLD AUTO: 10.4 FL (ref 8.9–12.7)
POTASSIUM SERPL-SCNC: 4.2 MMOL/L (ref 3.5–5.3)
PROT SERPL-MCNC: 7.3 G/DL (ref 6.4–8.2)
RBC # BLD AUTO: 4.59 MILLION/UL (ref 3.81–5.12)
SODIUM SERPL-SCNC: 140 MMOL/L (ref 136–145)
T4 FREE SERPL-MCNC: 1.3 NG/DL (ref 0.76–1.46)
TRIGL SERPL-MCNC: 72 MG/DL
TSH SERPL DL<=0.05 MIU/L-ACNC: 2.04 UIU/ML (ref 0.36–3.74)
WBC # BLD AUTO: 6.85 THOUSAND/UL (ref 4.31–10.16)

## 2020-09-16 PROCEDURE — 82306 VITAMIN D 25 HYDROXY: CPT

## 2020-09-16 PROCEDURE — 84439 ASSAY OF FREE THYROXINE: CPT

## 2020-09-16 PROCEDURE — 80053 COMPREHEN METABOLIC PANEL: CPT

## 2020-09-16 PROCEDURE — 80061 LIPID PANEL: CPT

## 2020-09-16 PROCEDURE — 85025 COMPLETE CBC W/AUTO DIFF WBC: CPT

## 2020-09-16 PROCEDURE — 84443 ASSAY THYROID STIM HORMONE: CPT

## 2020-09-16 PROCEDURE — 36415 COLL VENOUS BLD VENIPUNCTURE: CPT

## 2020-10-06 DIAGNOSIS — J30.1 SEASONAL ALLERGIC RHINITIS DUE TO POLLEN: ICD-10-CM

## 2020-10-06 DIAGNOSIS — E03.9 HYPOTHYROIDISM, UNSPECIFIED TYPE: ICD-10-CM

## 2020-10-06 DIAGNOSIS — I10 ESSENTIAL HYPERTENSION: ICD-10-CM

## 2020-10-06 RX ORDER — FLUTICASONE PROPIONATE 50 MCG
1 SPRAY, SUSPENSION (ML) NASAL DAILY
Qty: 16 G | Refills: 3 | Status: SHIPPED | OUTPATIENT
Start: 2020-10-06 | End: 2020-10-06

## 2020-10-06 RX ORDER — LISINOPRIL 5 MG/1
5 TABLET ORAL DAILY
Qty: 90 TABLET | Refills: 3 | Status: SHIPPED | OUTPATIENT
Start: 2020-10-06 | End: 2020-10-09 | Stop reason: SDUPTHER

## 2020-10-06 RX ORDER — FLUTICASONE PROPIONATE 50 MCG
SPRAY, SUSPENSION (ML) NASAL
Qty: 48 G | Refills: 3 | Status: SHIPPED | OUTPATIENT
Start: 2020-10-06 | End: 2021-10-06

## 2020-10-06 RX ORDER — LEVOTHYROXINE SODIUM 0.07 MG/1
75 TABLET ORAL DAILY
Qty: 90 TABLET | Refills: 3 | Status: SHIPPED | OUTPATIENT
Start: 2020-10-06 | End: 2020-10-09 | Stop reason: SDUPTHER

## 2020-10-09 DIAGNOSIS — E03.9 HYPOTHYROIDISM, UNSPECIFIED TYPE: ICD-10-CM

## 2020-10-09 DIAGNOSIS — I10 ESSENTIAL HYPERTENSION: ICD-10-CM

## 2020-10-09 RX ORDER — LEVOTHYROXINE SODIUM 0.07 MG/1
75 TABLET ORAL DAILY
Qty: 90 TABLET | Refills: 3 | Status: SHIPPED | OUTPATIENT
Start: 2020-10-09 | End: 2021-09-16

## 2020-10-09 RX ORDER — LISINOPRIL 5 MG/1
5 TABLET ORAL DAILY
Qty: 90 TABLET | Refills: 3 | Status: SHIPPED | OUTPATIENT
Start: 2020-10-09 | End: 2021-09-16

## 2020-12-07 ENCOUNTER — OFFICE VISIT (OUTPATIENT)
Dept: INTERNAL MEDICINE CLINIC | Facility: CLINIC | Age: 80
End: 2020-12-07
Payer: MEDICARE

## 2020-12-07 VITALS
SYSTOLIC BLOOD PRESSURE: 138 MMHG | OXYGEN SATURATION: 99 % | WEIGHT: 134 LBS | TEMPERATURE: 98.2 F | BODY MASS INDEX: 24.66 KG/M2 | HEIGHT: 62 IN | DIASTOLIC BLOOD PRESSURE: 76 MMHG | HEART RATE: 82 BPM

## 2020-12-07 DIAGNOSIS — Z00.00 MEDICARE ANNUAL WELLNESS VISIT, SUBSEQUENT: Primary | ICD-10-CM

## 2020-12-07 DIAGNOSIS — R42 VERTIGO: ICD-10-CM

## 2020-12-07 DIAGNOSIS — E55.9 VITAMIN D DEFICIENCY: ICD-10-CM

## 2020-12-07 DIAGNOSIS — I10 HYPERTENSION, BENIGN: ICD-10-CM

## 2020-12-07 DIAGNOSIS — F41.9 ANXIETY: ICD-10-CM

## 2020-12-07 DIAGNOSIS — E78.2 MIXED HYPERLIPIDEMIA: ICD-10-CM

## 2020-12-07 DIAGNOSIS — E03.9 ACQUIRED HYPOTHYROIDISM: ICD-10-CM

## 2020-12-07 PROCEDURE — 99214 OFFICE O/P EST MOD 30 MIN: CPT | Performed by: INTERNAL MEDICINE

## 2020-12-07 PROCEDURE — G0439 PPPS, SUBSEQ VISIT: HCPCS | Performed by: INTERNAL MEDICINE

## 2020-12-07 PROCEDURE — 1123F ACP DISCUSS/DSCN MKR DOCD: CPT | Performed by: INTERNAL MEDICINE

## 2020-12-07 RX ORDER — BUSPIRONE HYDROCHLORIDE 10 MG/1
10 TABLET ORAL 2 TIMES DAILY
Qty: 180 TABLET | Refills: 1 | Status: SHIPPED | OUTPATIENT
Start: 2020-12-07 | End: 2020-12-29 | Stop reason: SDUPTHER

## 2020-12-29 DIAGNOSIS — Z03.818 ENCOUNTER FOR OBSERVATION FOR SUSPECTED EXPOSURE TO OTHER BIOLOGICAL AGENTS RULED OUT: ICD-10-CM

## 2020-12-29 DIAGNOSIS — Z03.818 ENCOUNTER FOR OBSERVATION FOR SUSPECTED EXPOSURE TO OTHER BIOLOGICAL AGENTS RULED OUT: Primary | ICD-10-CM

## 2020-12-29 DIAGNOSIS — F41.9 ANXIETY: ICD-10-CM

## 2020-12-29 PROCEDURE — U0003 INFECTIOUS AGENT DETECTION BY NUCLEIC ACID (DNA OR RNA); SEVERE ACUTE RESPIRATORY SYNDROME CORONAVIRUS 2 (SARS-COV-2) (CORONAVIRUS DISEASE [COVID-19]), AMPLIFIED PROBE TECHNIQUE, MAKING USE OF HIGH THROUGHPUT TECHNOLOGIES AS DESCRIBED BY CMS-2020-01-R: HCPCS | Performed by: INTERNAL MEDICINE

## 2020-12-29 RX ORDER — BUSPIRONE HYDROCHLORIDE 5 MG/1
5 TABLET ORAL 2 TIMES DAILY
Qty: 180 TABLET | Refills: 3 | Status: SHIPPED | OUTPATIENT
Start: 2020-12-29 | End: 2021-07-06 | Stop reason: ALTCHOICE

## 2020-12-29 NOTE — TELEPHONE ENCOUNTER
Left a detailed message telling pt testing was ordered, the address of the [place] location and that medication was sent in  Office number given for pt to contact to make a virtual appt

## 2020-12-29 NOTE — TELEPHONE ENCOUNTER
The buspirone med was sent to wrong pharmacy recently    Please resend to the mail away , she is asking for 5 mg tablets be sent,   That way she doesn't have to cut the 10 mg pills in half if need be  [facility] please     ALSO     Pt found out she was around a positive covid person while doing a walk outside, unmasked walking and talking for 10 mins    she is asking DR if she should be tested  She is asymptomatic

## 2020-12-29 NOTE — TELEPHONE ENCOUNTER
You can put in order for test   She should go ideally 3-5 days after exposure to the positive COVID patient  She should then be set up for a video visit a day or 2 after her test   She should maintain isolation

## 2020-12-30 LAB — SARS-COV-2 RNA SPEC QL NAA+PROBE: NOT DETECTED

## 2021-02-10 ENCOUNTER — IMMUNIZATIONS (OUTPATIENT)
Dept: FAMILY MEDICINE CLINIC | Facility: HOSPITAL | Age: 81
End: 2021-02-10

## 2021-02-10 DIAGNOSIS — Z23 ENCOUNTER FOR IMMUNIZATION: Primary | ICD-10-CM

## 2021-02-10 PROCEDURE — 91301 SARS-COV-2 / COVID-19 MRNA VACCINE (MODERNA) 100 MCG: CPT

## 2021-02-10 PROCEDURE — 0011A SARS-COV-2 / COVID-19 MRNA VACCINE (MODERNA) 100 MCG: CPT

## 2021-03-09 ENCOUNTER — IMMUNIZATIONS (OUTPATIENT)
Dept: FAMILY MEDICINE CLINIC | Facility: HOSPITAL | Age: 81
End: 2021-03-09

## 2021-03-09 DIAGNOSIS — Z23 ENCOUNTER FOR IMMUNIZATION: Primary | ICD-10-CM

## 2021-03-09 PROCEDURE — 0012A SARS-COV-2 / COVID-19 MRNA VACCINE (MODERNA) 100 MCG: CPT

## 2021-03-09 PROCEDURE — 91301 SARS-COV-2 / COVID-19 MRNA VACCINE (MODERNA) 100 MCG: CPT

## 2021-07-01 ENCOUNTER — APPOINTMENT (OUTPATIENT)
Dept: LAB | Facility: CLINIC | Age: 81
End: 2021-07-01
Payer: MEDICARE

## 2021-07-01 DIAGNOSIS — I10 HYPERTENSION, BENIGN: ICD-10-CM

## 2021-07-01 DIAGNOSIS — E78.2 MIXED HYPERLIPIDEMIA: ICD-10-CM

## 2021-07-01 DIAGNOSIS — E03.9 ACQUIRED HYPOTHYROIDISM: ICD-10-CM

## 2021-07-01 LAB
ALBUMIN SERPL BCP-MCNC: 3.6 G/DL (ref 3.5–5)
ALP SERPL-CCNC: 82 U/L (ref 46–116)
ALT SERPL W P-5'-P-CCNC: 35 U/L (ref 12–78)
ANION GAP SERPL CALCULATED.3IONS-SCNC: 3 MMOL/L (ref 4–13)
AST SERPL W P-5'-P-CCNC: 21 U/L (ref 5–45)
BASOPHILS # BLD AUTO: 0.1 THOUSANDS/ΜL (ref 0–0.1)
BASOPHILS NFR BLD AUTO: 1 % (ref 0–1)
BILIRUB SERPL-MCNC: 0.49 MG/DL (ref 0.2–1)
BUN SERPL-MCNC: 22 MG/DL (ref 5–25)
CALCIUM SERPL-MCNC: 9.2 MG/DL (ref 8.3–10.1)
CHLORIDE SERPL-SCNC: 103 MMOL/L (ref 100–108)
CHOLEST SERPL-MCNC: 200 MG/DL (ref 50–200)
CO2 SERPL-SCNC: 30 MMOL/L (ref 21–32)
CREAT SERPL-MCNC: 0.94 MG/DL (ref 0.6–1.3)
EOSINOPHIL # BLD AUTO: 0.3 THOUSAND/ΜL (ref 0–0.61)
EOSINOPHIL NFR BLD AUTO: 4 % (ref 0–6)
ERYTHROCYTE [DISTWIDTH] IN BLOOD BY AUTOMATED COUNT: 13.2 % (ref 11.6–15.1)
GFR SERPL CREATININE-BSD FRML MDRD: 58 ML/MIN/1.73SQ M
GLUCOSE P FAST SERPL-MCNC: 89 MG/DL (ref 65–99)
HCT VFR BLD AUTO: 47.2 % (ref 34.8–46.1)
HDLC SERPL-MCNC: 59 MG/DL
HGB BLD-MCNC: 15.4 G/DL (ref 11.5–15.4)
IMM GRANULOCYTES # BLD AUTO: 0.02 THOUSAND/UL (ref 0–0.2)
IMM GRANULOCYTES NFR BLD AUTO: 0 % (ref 0–2)
LDLC SERPL CALC-MCNC: 122 MG/DL (ref 0–100)
LYMPHOCYTES # BLD AUTO: 2.15 THOUSANDS/ΜL (ref 0.6–4.47)
LYMPHOCYTES NFR BLD AUTO: 30 % (ref 14–44)
MCH RBC QN AUTO: 31.6 PG (ref 26.8–34.3)
MCHC RBC AUTO-ENTMCNC: 32.6 G/DL (ref 31.4–37.4)
MCV RBC AUTO: 97 FL (ref 82–98)
MONOCYTES # BLD AUTO: 0.75 THOUSAND/ΜL (ref 0.17–1.22)
MONOCYTES NFR BLD AUTO: 10 % (ref 4–12)
NEUTROPHILS # BLD AUTO: 3.9 THOUSANDS/ΜL (ref 1.85–7.62)
NEUTS SEG NFR BLD AUTO: 55 % (ref 43–75)
NONHDLC SERPL-MCNC: 141 MG/DL
NRBC BLD AUTO-RTO: 0 /100 WBCS
PLATELET # BLD AUTO: 245 THOUSANDS/UL (ref 149–390)
PMV BLD AUTO: 10 FL (ref 8.9–12.7)
POTASSIUM SERPL-SCNC: 4.4 MMOL/L (ref 3.5–5.3)
PROT SERPL-MCNC: 7.5 G/DL (ref 6.4–8.2)
RBC # BLD AUTO: 4.88 MILLION/UL (ref 3.81–5.12)
SODIUM SERPL-SCNC: 136 MMOL/L (ref 136–145)
T4 FREE SERPL-MCNC: 1.13 NG/DL (ref 0.76–1.46)
TRIGL SERPL-MCNC: 97 MG/DL
TSH SERPL DL<=0.05 MIU/L-ACNC: 1.33 UIU/ML (ref 0.36–3.74)
WBC # BLD AUTO: 7.22 THOUSAND/UL (ref 4.31–10.16)

## 2021-07-01 PROCEDURE — 84443 ASSAY THYROID STIM HORMONE: CPT

## 2021-07-01 PROCEDURE — 36415 COLL VENOUS BLD VENIPUNCTURE: CPT

## 2021-07-01 PROCEDURE — 84439 ASSAY OF FREE THYROXINE: CPT

## 2021-07-01 PROCEDURE — 85025 COMPLETE CBC W/AUTO DIFF WBC: CPT

## 2021-07-01 PROCEDURE — 80061 LIPID PANEL: CPT

## 2021-07-01 PROCEDURE — 80053 COMPREHEN METABOLIC PANEL: CPT

## 2021-07-06 ENCOUNTER — OFFICE VISIT (OUTPATIENT)
Dept: INTERNAL MEDICINE CLINIC | Facility: CLINIC | Age: 81
End: 2021-07-06
Payer: MEDICARE

## 2021-07-06 VITALS
WEIGHT: 131 LBS | OXYGEN SATURATION: 96 % | TEMPERATURE: 98 F | DIASTOLIC BLOOD PRESSURE: 74 MMHG | HEART RATE: 84 BPM | BODY MASS INDEX: 24.11 KG/M2 | SYSTOLIC BLOOD PRESSURE: 128 MMHG | HEIGHT: 62 IN

## 2021-07-06 DIAGNOSIS — E55.9 VITAMIN D DEFICIENCY: ICD-10-CM

## 2021-07-06 DIAGNOSIS — E03.9 ACQUIRED HYPOTHYROIDISM: ICD-10-CM

## 2021-07-06 DIAGNOSIS — E78.2 MIXED HYPERLIPIDEMIA: ICD-10-CM

## 2021-07-06 DIAGNOSIS — I10 HYPERTENSION, BENIGN: Primary | ICD-10-CM

## 2021-07-06 PROCEDURE — 99214 OFFICE O/P EST MOD 30 MIN: CPT | Performed by: INTERNAL MEDICINE

## 2021-07-06 NOTE — PROGRESS NOTES
Assessment/Plan:     Chronic problems are stable  Continue present medications  Continue diet and exercise  Ordered labs for next visit  Quality Measures:       Return for Regular visit and Medicare Wellness  No problem-specific Assessment & Plan notes found for this encounter  Diagnoses and all orders for this visit:    Hypertension, benign  -     CBC and differential; Future  -     Comprehensive metabolic panel; Future    Mixed hyperlipidemia  -     Lipid panel; Future    Acquired hypothyroidism  -     T4, free; Future  -     TSH, 3rd generation; Future    Vitamin D deficiency  -     Vitamin D 25 hydroxy; Future          Subjective:      Patient ID: Austin Gupta is a 80 y o  female  Patient comes in today for routine follow-up  She states she is doing okay for the most part  Her labs show her thyroid levels are controlled  Cholesterol is up slightly  Blood pressure is controlled  Taking her vitamin-D supplementation  She stopped the BuSpar it was not helping her anxiety  We have tried her on SSRIs before as well  She had side effects from that        ALLERGIES:  Allergies   Allergen Reactions    Sulfa Antibiotics Hives       CURRENT MEDICATIONS:    Current Outpatient Medications:     fluticasone (FLONASE) 50 mcg/act nasal spray, SHAKE LIQUID AND USE 1 SPRAY IN EACH NOSTRIL DAILY, Disp: 48 g, Rfl: 3    levothyroxine 75 mcg tablet, Take 1 tablet (75 mcg total) by mouth daily, Disp: 90 tablet, Rfl: 3    lisinopril (ZESTRIL) 5 mg tablet, Take 1 tablet (5 mg total) by mouth daily, Disp: 90 tablet, Rfl: 3    Lumigan 0 01 % ophthalmic drops, , Disp: , Rfl:     ACTIVE PROBLEM LIST:  Patient Active Problem List   Diagnosis    Allergic rhinitis    Hyperlipidemia    Hypertension, benign    Hypothyroidism    Vitamin D deficiency       PAST MEDICAL HISTORY:  Past Medical History:   Diagnosis Date    Lumbar stenosis     Transient global amnesia        PAST SURGICAL HISTORY:  No past surgical history on file  FAMILY HISTORY:  Family History   Problem Relation Age of Onset    Stroke Mother 61        CVA    Pancreatic cancer Father     Hyperlipidemia Sister        SOCIAL HISTORY:  Social History     Socioeconomic History    Marital status: /Civil Union     Spouse name: Not on file    Number of children: 3    Years of education: Not on file    Highest education level: Not on file   Occupational History    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 in the Last Year:    Transportation Needs:     Lack of Transportation (Medical):  Lack of Transportation (Non-Medical):    Physical Activity:     Days of Exercise per Week:     Minutes of Exercise per Session:    Stress:     Feeling of Stress :    Social Connections:     Frequency of Communication with Friends and Family:     Frequency of Social Gatherings with Friends and Family:     Attends Pentecostal Services:     Active Member of Clubs or Organizations:     Attends Club or Organization Meetings:     Marital Status:    Intimate Partner Violence:     Fear of Current or Ex-Partner:     Emotionally Abused:     Physically Abused:     Sexually Abused:        Review of Systems   Respiratory: Negative for shortness of breath  Cardiovascular: Negative for chest pain  Gastrointestinal: Negative for abdominal pain           Objective:  Vitals:    07/06/21 1601   BP: 128/74   BP Location: Left arm Patient Position: Sitting   Cuff Size: Adult   Pulse: 84   Temp: 98 °F (36 7 °C)   TempSrc: Temporal   SpO2: 96%   Weight: 59 4 kg (131 lb)   Height: 5' 2" (1 575 m)     Body mass index is 23 96 kg/m²  Physical Exam  Vitals and nursing note reviewed  Constitutional:       Appearance: She is well-developed  Cardiovascular:      Rate and Rhythm: Normal rate and regular rhythm  Heart sounds: Normal heart sounds  Pulmonary:      Effort: Pulmonary effort is normal       Breath sounds: Normal breath sounds  Abdominal:      Palpations: Abdomen is soft  Tenderness: There is no abdominal tenderness  Neurological:      Mental Status: She is alert and oriented to person, place, and time             RESULTS:    Recent Results (from the past 1008 hour(s))   CBC and differential    Collection Time: 07/01/21 10:24 AM   Result Value Ref Range    WBC 7 22 4 31 - 10 16 Thousand/uL    RBC 4 88 3 81 - 5 12 Million/uL    Hemoglobin 15 4 11 5 - 15 4 g/dL    Hematocrit 47 2 (H) 34 8 - 46 1 %    MCV 97 82 - 98 fL    MCH 31 6 26 8 - 34 3 pg    MCHC 32 6 31 4 - 37 4 g/dL    RDW 13 2 11 6 - 15 1 %    MPV 10 0 8 9 - 12 7 fL    Platelets 357 854 - 647 Thousands/uL    nRBC 0 /100 WBCs    Neutrophils Relative 55 43 - 75 %    Immat GRANS % 0 0 - 2 %    Lymphocytes Relative 30 14 - 44 %    Monocytes Relative 10 4 - 12 %    Eosinophils Relative 4 0 - 6 %    Basophils Relative 1 0 - 1 %    Neutrophils Absolute 3 90 1 85 - 7 62 Thousands/µL    Immature Grans Absolute 0 02 0 00 - 0 20 Thousand/uL    Lymphocytes Absolute 2 15 0 60 - 4 47 Thousands/µL    Monocytes Absolute 0 75 0 17 - 1 22 Thousand/µL    Eosinophils Absolute 0 30 0 00 - 0 61 Thousand/µL    Basophils Absolute 0 10 0 00 - 0 10 Thousands/µL   Comprehensive metabolic panel    Collection Time: 07/01/21 10:24 AM   Result Value Ref Range    Sodium 136 136 - 145 mmol/L    Potassium 4 4 3 5 - 5 3 mmol/L    Chloride 103 100 - 108 mmol/L    CO2 30 21 - 32 mmol/L    ANION GAP 3 (L) 4 - 13 mmol/L    BUN 22 5 - 25 mg/dL    Creatinine 0 94 0 60 - 1 30 mg/dL    Glucose, Fasting 89 65 - 99 mg/dL    Calcium 9 2 8 3 - 10 1 mg/dL    AST 21 5 - 45 U/L    ALT 35 12 - 78 U/L    Alkaline Phosphatase 82 46 - 116 U/L    Total Protein 7 5 6 4 - 8 2 g/dL    Albumin 3 6 3 5 - 5 0 g/dL    Total Bilirubin 0 49 0 20 - 1 00 mg/dL    eGFR 58 ml/min/1 73sq m   Lipid panel    Collection Time: 07/01/21 10:24 AM   Result Value Ref Range    Cholesterol 200 50 - 200 mg/dL    Triglycerides 97 <=150 mg/dL    HDL, Direct 59 >=40 mg/dL    LDL Calculated 122 (H) 0 - 100 mg/dL    Non-HDL-Chol (CHOL-HDL) 141 mg/dl   T4, free    Collection Time: 07/01/21 10:24 AM   Result Value Ref Range    Free T4 1 13 0 76 - 1 46 ng/dL   TSH, 3rd generation    Collection Time: 07/01/21 10:24 AM   Result Value Ref Range    TSH 3RD GENERATON 1 330 0 358 - 3 740 uIU/mL       This note was created with voice recognition software  Phonic, grammatical and spelling errors may be present within the note as a result

## 2021-09-16 DIAGNOSIS — I10 ESSENTIAL HYPERTENSION: ICD-10-CM

## 2021-09-16 DIAGNOSIS — E03.9 HYPOTHYROIDISM, UNSPECIFIED TYPE: ICD-10-CM

## 2021-09-16 RX ORDER — LISINOPRIL 5 MG/1
TABLET ORAL
Qty: 90 TABLET | Refills: 3 | Status: SHIPPED | OUTPATIENT
Start: 2021-09-16

## 2021-09-16 RX ORDER — LEVOTHYROXINE SODIUM 0.07 MG/1
TABLET ORAL
Qty: 90 TABLET | Refills: 3 | Status: SHIPPED | OUTPATIENT
Start: 2021-09-16

## 2021-10-06 DIAGNOSIS — J30.1 SEASONAL ALLERGIC RHINITIS DUE TO POLLEN: ICD-10-CM

## 2021-10-06 RX ORDER — FLUTICASONE PROPIONATE 50 MCG
SPRAY, SUSPENSION (ML) NASAL
Qty: 48 G | Refills: 3 | Status: SHIPPED | OUTPATIENT
Start: 2021-10-06

## 2021-12-30 ENCOUNTER — OFFICE VISIT (OUTPATIENT)
Dept: INTERNAL MEDICINE CLINIC | Facility: CLINIC | Age: 81
End: 2021-12-30
Payer: MEDICARE

## 2021-12-30 VITALS
BODY MASS INDEX: 23.89 KG/M2 | OXYGEN SATURATION: 98 % | TEMPERATURE: 98.3 F | DIASTOLIC BLOOD PRESSURE: 78 MMHG | SYSTOLIC BLOOD PRESSURE: 120 MMHG | HEART RATE: 91 BPM | WEIGHT: 129.8 LBS | HEIGHT: 62 IN

## 2021-12-30 DIAGNOSIS — E03.9 HYPOTHYROIDISM, ACQUIRED: ICD-10-CM

## 2021-12-30 DIAGNOSIS — M25.50 PAIN IN JOINT, MULTIPLE SITES: ICD-10-CM

## 2021-12-30 DIAGNOSIS — F32.1 CURRENT MODERATE EPISODE OF MAJOR DEPRESSIVE DISORDER WITHOUT PRIOR EPISODE (HCC): Primary | ICD-10-CM

## 2021-12-30 PROBLEM — F32.A DEPRESSION: Status: ACTIVE | Noted: 2021-12-30

## 2021-12-30 PROCEDURE — 99214 OFFICE O/P EST MOD 30 MIN: CPT | Performed by: INTERNAL MEDICINE

## 2021-12-30 RX ORDER — ESCITALOPRAM OXALATE 5 MG/1
5 TABLET ORAL DAILY
Qty: 30 TABLET | Refills: 3 | Status: SHIPPED | OUTPATIENT
Start: 2021-12-30 | End: 2022-02-02 | Stop reason: SDUPTHER

## 2021-12-30 RX ORDER — BUSPIRONE HYDROCHLORIDE 5 MG/1
TABLET ORAL
COMMUNITY
Start: 2021-04-15 | End: 2022-03-14 | Stop reason: ALTCHOICE

## 2021-12-31 ENCOUNTER — APPOINTMENT (OUTPATIENT)
Dept: LAB | Facility: CLINIC | Age: 81
End: 2021-12-31
Payer: MEDICARE

## 2021-12-31 DIAGNOSIS — E03.9 HYPOTHYROIDISM, ACQUIRED: ICD-10-CM

## 2021-12-31 DIAGNOSIS — M25.50 PAIN IN JOINT, MULTIPLE SITES: ICD-10-CM

## 2021-12-31 LAB
ALBUMIN SERPL BCP-MCNC: 3.2 G/DL (ref 3.5–5)
ALP SERPL-CCNC: 101 U/L (ref 46–116)
ALT SERPL W P-5'-P-CCNC: 28 U/L (ref 12–78)
ANION GAP SERPL CALCULATED.3IONS-SCNC: 4 MMOL/L (ref 4–13)
AST SERPL W P-5'-P-CCNC: 18 U/L (ref 5–45)
BASOPHILS # BLD AUTO: 0.09 THOUSANDS/ΜL (ref 0–0.1)
BASOPHILS NFR BLD AUTO: 1 % (ref 0–1)
BILIRUB SERPL-MCNC: 0.46 MG/DL (ref 0.2–1)
BUN SERPL-MCNC: 12 MG/DL (ref 5–25)
CALCIUM ALBUM COR SERPL-MCNC: 10 MG/DL (ref 8.3–10.1)
CALCIUM SERPL-MCNC: 9.4 MG/DL (ref 8.3–10.1)
CHLORIDE SERPL-SCNC: 102 MMOL/L (ref 100–108)
CHOLEST SERPL-MCNC: 162 MG/DL
CO2 SERPL-SCNC: 28 MMOL/L (ref 21–32)
CREAT SERPL-MCNC: 0.74 MG/DL (ref 0.6–1.3)
CRP SERPL QL: 39.3 MG/L
EOSINOPHIL # BLD AUTO: 0.16 THOUSAND/ΜL (ref 0–0.61)
EOSINOPHIL NFR BLD AUTO: 2 % (ref 0–6)
ERYTHROCYTE [DISTWIDTH] IN BLOOD BY AUTOMATED COUNT: 12.2 % (ref 11.6–15.1)
GFR SERPL CREATININE-BSD FRML MDRD: 77 ML/MIN/1.73SQ M
GLUCOSE P FAST SERPL-MCNC: 81 MG/DL (ref 65–99)
HCT VFR BLD AUTO: 40.9 % (ref 34.8–46.1)
HDLC SERPL-MCNC: 49 MG/DL
HGB BLD-MCNC: 13.2 G/DL (ref 11.5–15.4)
IMM GRANULOCYTES # BLD AUTO: 0.04 THOUSAND/UL (ref 0–0.2)
IMM GRANULOCYTES NFR BLD AUTO: 1 % (ref 0–2)
LDLC SERPL CALC-MCNC: 96 MG/DL (ref 0–100)
LYMPHOCYTES # BLD AUTO: 2.12 THOUSANDS/ΜL (ref 0.6–4.47)
LYMPHOCYTES NFR BLD AUTO: 25 % (ref 14–44)
MCH RBC QN AUTO: 30.4 PG (ref 26.8–34.3)
MCHC RBC AUTO-ENTMCNC: 32.3 G/DL (ref 31.4–37.4)
MCV RBC AUTO: 94 FL (ref 82–98)
MONOCYTES # BLD AUTO: 0.79 THOUSAND/ΜL (ref 0.17–1.22)
MONOCYTES NFR BLD AUTO: 9 % (ref 4–12)
NEUTROPHILS # BLD AUTO: 5.21 THOUSANDS/ΜL (ref 1.85–7.62)
NEUTS SEG NFR BLD AUTO: 62 % (ref 43–75)
NONHDLC SERPL-MCNC: 113 MG/DL
NRBC BLD AUTO-RTO: 0 /100 WBCS
PLATELET # BLD AUTO: 360 THOUSANDS/UL (ref 149–390)
PMV BLD AUTO: 9.4 FL (ref 8.9–12.7)
POTASSIUM SERPL-SCNC: 4.1 MMOL/L (ref 3.5–5.3)
PROT SERPL-MCNC: 7.8 G/DL (ref 6.4–8.2)
RBC # BLD AUTO: 4.34 MILLION/UL (ref 3.81–5.12)
SODIUM SERPL-SCNC: 134 MMOL/L (ref 136–145)
T4 FREE SERPL-MCNC: 1.48 NG/DL (ref 0.76–1.46)
TRIGL SERPL-MCNC: 84 MG/DL
TSH SERPL DL<=0.05 MIU/L-ACNC: 1.86 UIU/ML (ref 0.36–3.74)
URATE SERPL-MCNC: 4.4 MG/DL (ref 2–6.8)
WBC # BLD AUTO: 8.41 THOUSAND/UL (ref 4.31–10.16)

## 2021-12-31 PROCEDURE — 86617 LYME DISEASE ANTIBODY: CPT

## 2021-12-31 PROCEDURE — 84443 ASSAY THYROID STIM HORMONE: CPT

## 2021-12-31 PROCEDURE — 86430 RHEUMATOID FACTOR TEST QUAL: CPT

## 2021-12-31 PROCEDURE — 86038 ANTINUCLEAR ANTIBODIES: CPT

## 2021-12-31 PROCEDURE — 36415 COLL VENOUS BLD VENIPUNCTURE: CPT

## 2021-12-31 PROCEDURE — 80053 COMPREHEN METABOLIC PANEL: CPT

## 2021-12-31 PROCEDURE — 86140 C-REACTIVE PROTEIN: CPT

## 2021-12-31 PROCEDURE — 86431 RHEUMATOID FACTOR QUANT: CPT

## 2021-12-31 PROCEDURE — 86200 CCP ANTIBODY: CPT

## 2021-12-31 PROCEDURE — 84550 ASSAY OF BLOOD/URIC ACID: CPT

## 2021-12-31 PROCEDURE — 84439 ASSAY OF FREE THYROXINE: CPT

## 2021-12-31 PROCEDURE — 80061 LIPID PANEL: CPT

## 2021-12-31 PROCEDURE — 86618 LYME DISEASE ANTIBODY: CPT

## 2021-12-31 PROCEDURE — 85025 COMPLETE CBC W/AUTO DIFF WBC: CPT

## 2022-01-01 LAB — B BURGDOR IGG+IGM SER-ACNC: 121

## 2022-01-03 LAB
B BURGDOR IGG PATRN SER IB-IMP: NEGATIVE
B BURGDOR IGM PATRN SER IB-IMP: NEGATIVE
B BURGDOR18KD IGG SER QL IB: NORMAL
B BURGDOR23KD IGG SER QL IB: NORMAL
B BURGDOR23KD IGM SER QL IB: NORMAL
B BURGDOR28KD IGG SER QL IB: NORMAL
B BURGDOR30KD IGG SER QL IB: NORMAL
B BURGDOR39KD IGG SER QL IB: NORMAL
B BURGDOR39KD IGM SER QL IB: NORMAL
B BURGDOR41KD IGG SER QL IB: NORMAL
B BURGDOR41KD IGM SER QL IB: NORMAL
B BURGDOR45KD IGG SER QL IB: NORMAL
B BURGDOR58KD IGG SER QL IB: NORMAL
B BURGDOR66KD IGG SER QL IB: NORMAL
B BURGDOR93KD IGG SER QL IB: NORMAL
CRYOGLOB RF SER-ACNC: ABNORMAL [IU]/ML
RHEUMATOID FACT SER QL LA: POSITIVE

## 2022-01-04 ENCOUNTER — TELEPHONE (OUTPATIENT)
Dept: INTERNAL MEDICINE CLINIC | Facility: CLINIC | Age: 82
End: 2022-01-04

## 2022-01-04 LAB
CCP AB SER IA-ACNC: 2.2
RYE IGE QN: NEGATIVE

## 2022-02-02 ENCOUNTER — OFFICE VISIT (OUTPATIENT)
Dept: INTERNAL MEDICINE CLINIC | Facility: CLINIC | Age: 82
End: 2022-02-02
Payer: MEDICARE

## 2022-02-02 VITALS
OXYGEN SATURATION: 98 % | SYSTOLIC BLOOD PRESSURE: 120 MMHG | WEIGHT: 129 LBS | HEART RATE: 87 BPM | TEMPERATURE: 98 F | HEIGHT: 62 IN | BODY MASS INDEX: 23.74 KG/M2 | DIASTOLIC BLOOD PRESSURE: 78 MMHG

## 2022-02-02 DIAGNOSIS — F32.1 CURRENT MODERATE EPISODE OF MAJOR DEPRESSIVE DISORDER WITHOUT PRIOR EPISODE (HCC): ICD-10-CM

## 2022-02-02 DIAGNOSIS — Z00.00 MEDICARE ANNUAL WELLNESS VISIT, SUBSEQUENT: Primary | ICD-10-CM

## 2022-02-02 DIAGNOSIS — Z78.0 POSTMENOPAUSAL: ICD-10-CM

## 2022-02-02 DIAGNOSIS — M05.80 POLYARTHRITIS WITH POSITIVE RHEUMATOID FACTOR (HCC): ICD-10-CM

## 2022-02-02 PROCEDURE — G0439 PPPS, SUBSEQ VISIT: HCPCS | Performed by: INTERNAL MEDICINE

## 2022-02-02 PROCEDURE — 99214 OFFICE O/P EST MOD 30 MIN: CPT | Performed by: INTERNAL MEDICINE

## 2022-02-02 RX ORDER — ESCITALOPRAM OXALATE 5 MG/1
5 TABLET ORAL DAILY
Qty: 90 TABLET | Refills: 1 | Status: SHIPPED | OUTPATIENT
Start: 2022-02-02 | End: 2022-03-14 | Stop reason: SDUPTHER

## 2022-02-02 RX ORDER — MELOXICAM 7.5 MG/1
7.5 TABLET ORAL DAILY
Qty: 90 TABLET | Refills: 1 | Status: SHIPPED | OUTPATIENT
Start: 2022-02-02 | End: 2022-03-14 | Stop reason: ALTCHOICE

## 2022-02-02 NOTE — PATIENT INSTRUCTIONS
Medicare Preventive Visit Patient Instructions  Thank you for completing your Welcome to Medicare Visit or Medicare Annual Wellness Visit today  Your next wellness visit will be due in one year (2/3/2023)  The screening/preventive services that you may require over the next 5-10 years are detailed below  Some tests may not apply to you based off risk factors and/or age  Screening tests ordered at today's visit but not completed yet may show as past due  Also, please note that scanned in results may not display below  Preventive Screenings:  Service Recommendations Previous Testing/Comments   Colorectal Cancer Screening  * Colonoscopy    * Fecal Occult Blood Test (FOBT)/Fecal Immunochemical Test (FIT)  * Fecal DNA/Cologuard Test  * Flexible Sigmoidoscopy Age: 54-65 years old   Colonoscopy: every 10 years (may be performed more frequently if at higher risk)  OR  FOBT/FIT: every 1 year  OR  Cologuard: every 3 years  OR  Sigmoidoscopy: every 5 years  Screening may be recommended earlier than age 48 if at higher risk for colorectal cancer  Also, an individualized decision between you and your healthcare provider will decide whether screening between the ages of 74-80 would be appropriate  Colonoscopy: Not on file  FOBT/FIT: Not on file  Cologuard: Not on file  Sigmoidoscopy: Not on file          Breast Cancer Screening Age: 36 years old  Frequency: every 1-2 years  Not required if history of left and right mastectomy Mammogram: Not on file        Cervical Cancer Screening Between the ages of 21-29, pap smear recommended once every 3 years  Between the ages of 33-67, can perform pap smear with HPV co-testing every 5 years     Recommendations may differ for women with a history of total hysterectomy, cervical cancer, or abnormal pap smears in past  Pap Smear: Not on file    Screening Not Indicated   Hepatitis C Screening Once for adults born between [facility]  More frequently in patients at high risk for Hepatitis C Hep C Antibody: Not on file        Diabetes Screening 1-2 times per year if you're at risk for diabetes or have pre-diabetes Fasting glucose: 81 mg/dL   A1C: No results in last 5 years    Screening Current   Cholesterol Screening Once every 5 years if you don't have a lipid disorder  May order more often based on risk factors  Lipid panel: 12/31/2021    Screening Not Indicated  History Lipid Disorder     Other Preventive Screenings Covered by Medicare:  1  Abdominal Aortic Aneurysm (AAA) Screening: covered once if your at risk  You're considered to be at risk if you have a family history of AAA  2  Lung Cancer Screening: covers low dose CT scan once per year if you meet all of the following conditions: (1) Age 50-69; (2) No signs or symptoms of lung cancer; (3) Current smoker or have quit smoking within the last 15 years; (4) You have a tobacco smoking history of at least 30 pack years (packs per day multiplied by number of years you smoked); (5) You get a written order from a healthcare provider  3  Glaucoma Screening: covered annually if you're considered high risk: (1) You have diabetes OR (2) Family history of glaucoma OR (3)  aged 48 and older OR (3)  American aged 72 and older  3  Osteoporosis Screening: covered every 2 years if you meet one of the following conditions: (1) You're estrogen deficient and at risk for osteoporosis based off medical history and other findings; (2) Have a vertebral abnormality; (3) On glucocorticoid therapy for more than 3 months; (4) Have primary hyperparathyroidism; (5) On osteoporosis medications and need to assess response to drug therapy  · Last bone density test (DXA Scan): Not on file  5  HIV Screening: covered annually if you're between the age of 12-76  Also covered annually if you are younger than 13 and older than 72 with risk factors for HIV infection   For pregnant patients, it is covered up to 3 times per pregnancy  Immunizations:  Immunization Recommendations   Influenza Vaccine Annual influenza vaccination during flu season is recommended for all persons aged >= 6 months who do not have contraindications   Pneumococcal Vaccine (Prevnar and Pneumovax)  * Prevnar = PCV13  * Pneumovax = PPSV23   Adults 25-60 years old: 1-3 doses may be recommended based on certain risk factors  Adults 72 years old: Prevnar (PCV13) vaccine recommended followed by Pneumovax (PPSV23) vaccine  If already received PPSV23 since turning 65, then PCV13 recommended at least one year after PPSV23 dose  Hepatitis B Vaccine 3 dose series if at intermediate or high risk (ex: diabetes, end stage renal disease, liver disease)   Tetanus (Td) Vaccine - COST NOT COVERED BY MEDICARE PART B Following completion of primary series, a booster dose should be given every 10 years to maintain immunity against tetanus  Td may also be given as tetanus wound prophylaxis  Tdap Vaccine - COST NOT COVERED BY MEDICARE PART B Recommended at least once for all adults  For pregnant patients, recommended with each pregnancy  Shingles Vaccine (Shingrix) - COST NOT COVERED BY MEDICARE PART B  2 shot series recommended in those aged 48 and above     Health Maintenance Due:      Topic Date Due    Colorectal Cancer Screening  01/07/2021     Immunizations Due:      Topic Date Due    DTaP,Tdap,and Td Vaccines (1 - Tdap) Never done    Pneumococcal Vaccine: 65+ Years (1 of 1 - PPSV23) Never done    COVID-19 Vaccine (3 - Booster for Moderna series) 08/09/2021    Influenza Vaccine (1) Never done     Advance Directives   What are advance directives? Advance directives are legal documents that state your wishes and plans for medical care  These plans are made ahead of time in case you lose your ability to make decisions for yourself  Advance directives can apply to any medical decision, such as the treatments you want, and if you want to donate organs     What are the types of advance directives? There are many types of advance directives, and each state has rules about how to use them  You may choose a combination of any of the following:  · Living will: This is a written record of the treatment you want  You can also choose which treatments you do not want, which to limit, and which to stop at a certain time  This includes surgery, medicine, IV fluid, and tube feedings  · Durable power of  for healthcare [facility]): This is a written record that states who you want to make healthcare choices for you when you are unable to make them for yourself  This person, called a proxy, is usually a family member or a friend  You may choose more than 1 proxy  · Do not resuscitate (DNR) order:  A DNR order is used in case your heart stops beating or you stop breathing  It is a request not to have certain forms of treatment, such as CPR  A DNR order may be included in other types of advance directives  · Medical directive: This covers the care that you want if you are in a coma, near death, or unable to make decisions for yourself  You can list the treatments you want for each condition  Treatment may include pain medicine, surgery, blood transfusions, dialysis, IV or tube feedings, and a ventilator (breathing machine)  · Values history: This document has questions about your views, beliefs, and how you feel and think about life  This information can help others choose the care that you would choose  Why are advance directives important? An advance directive helps you control your care  Although spoken wishes may be used, it is better to have your wishes written down  Spoken wishes can be misunderstood, or not followed  Treatments may be given even if you do not want them  An advance directive may make it easier for your family to make difficult choices about your care  Fall Prevention    Fall prevention  includes ways to make your home and other areas safer   It also includes ways you can move more carefully to prevent a fall  Health conditions that cause changes in your blood pressure, vision, or muscle strength and coordination may increase your risk for falls  Medicines may also increase your risk for falls if they make you dizzy, weak, or sleepy  Fall prevention tips:   · Stand or sit up slowly  · Use assistive devices as directed  · Wear shoes that fit well and have soles that   · Wear a personal alarm  · Stay active  · Manage your medical conditions  Home Safety Tips:  · Add items to prevent falls in the bathroom  · Keep paths clear  · Install bright lights in your home  · Keep items you use often on shelves within reach  · Paint or place reflective tape on the edges of your stairs  Urinary Incontinence   Urinary incontinence (UI)  is when you lose control of your bladder  UI develops because your bladder cannot store or empty urine properly  The 3 most common types of UI are stress incontinence, urge incontinence, or both  Medicines:   · May be given to help strengthen your bladder control  Report any side effects of medication to your healthcare provider  Do pelvic muscle exercises often:  Your pelvic muscles help you stop urinating  Squeeze these muscles tight for 5 seconds, then relax for 5 seconds  Gradually work up to squeezing for 10 seconds  Do 3 sets of 15 repetitions a day, or as directed  This will help strengthen your pelvic muscles and improve bladder control  Train your bladder:  Go to the bathroom at set times, such as every 2 hours, even if you do not feel the urge to go  You can also try to hold your urine when you feel the urge to go  For example, hold your urine for 5 minutes when you feel the urge to go  As that becomes easier, hold your urine for 10 minutes  Self-care:   · Keep a UI record  Write down how often you leak urine and how much you leak   Make a note of what you were doing when you leaked urine   · Drink liquids as directed  You may need to limit the amount of liquid you drink to help control your urine leakage  Do not drink any liquid right before you go to bed  Limit or do not have drinks that contain caffeine or alcohol  · Prevent constipation  Eat a variety of high-fiber foods  Good examples are high-fiber cereals, beans, vegetables, and whole-grain breads  Walking is the best way to trigger your intestines to have a bowel movement  · Exercise regularly and maintain a healthy weight  Weight loss and exercise will decrease pressure on your bladder and help you control your leakage  · Use a catheter as directed  to help empty your bladder  A catheter is a tiny, plastic tube that is put into your bladder to drain your urine  · Go to behavior therapy as directed  Behavior therapy may be used to help you learn to control your urge to urinate  © Copyright [employer] 2018 Information is for End User's use only and may not be sold, redistributed or otherwise used for commercial purposes  All illustrations and images included in CareNotes® are the copyrighted property of [employer]  or [facility] Preventive Visit Patient Instructions  Thank you for completing your Welcome to Medicare Visit or Medicare Annual Wellness Visit today  Your next wellness visit will be due in one year (2/3/2023)  The screening/preventive services that you may require over the next 5-10 years are detailed below  Some tests may not apply to you based off risk factors and/or age  Screening tests ordered at today's visit but not completed yet may show as past due  Also, please note that scanned in results may not display below    Preventive Screenings:  Service Recommendations Previous Testing/Comments   Colorectal Cancer Screening  * Colonoscopy    * Fecal Occult Blood Test (FOBT)/Fecal Immunochemical Test (FIT)  * Fecal DNA/Cologuard Test  * Flexible Sigmoidoscopy Age: 54-65 years old Colonoscopy: every 10 years (may be performed more frequently if at higher risk)  OR  FOBT/FIT: every 1 year  OR  Cologuard: every 3 years  OR  Sigmoidoscopy: every 5 years  Screening may be recommended earlier than age 48 if at higher risk for colorectal cancer  Also, an individualized decision between you and your healthcare provider will decide whether screening between the ages of 74-80 would be appropriate  Colonoscopy: Not on file  FOBT/FIT: Not on file  Cologuard: Not on file  Sigmoidoscopy: Not on file          Breast Cancer Screening Age: 36 years old  Frequency: every 1-2 years  Not required if history of left and right mastectomy Mammogram: Not on file        Cervical Cancer Screening Between the ages of 21-29, pap smear recommended once every 3 years  Between the ages of 33-67, can perform pap smear with HPV co-testing every 5 years  Recommendations may differ for women with a history of total hysterectomy, cervical cancer, or abnormal pap smears in past  Pap Smear: Not on file    Screening Not Indicated   Hepatitis C Screening Once for adults born between 1945 and 1965  More frequently in patients at high risk for Hepatitis C Hep C Antibody: Not on file        Diabetes Screening 1-2 times per year if you're at risk for diabetes or have pre-diabetes Fasting glucose: 81 mg/dL   A1C: No results in last 5 years    Screening Current   Cholesterol Screening Once every 5 years if you don't have a lipid disorder  May order more often based on risk factors  Lipid panel: 12/31/2021    Screening Not Indicated  History Lipid Disorder     Other Preventive Screenings Covered by Medicare:  6  Abdominal Aortic Aneurysm (AAA) Screening: covered once if your at risk  You're considered to be at risk if you have a family history of AAA    7  Lung Cancer Screening: covers low dose CT scan once per year if you meet all of the following conditions: (1) Age 50-69; (2) No signs or symptoms of lung cancer; (3) Current smoker or have quit smoking within the last 15 years; (4) You have a tobacco smoking history of at least 30 pack years (packs per day multiplied by number of years you smoked); (5) You get a written order from a healthcare provider  8  Glaucoma Screening: covered annually if you're considered high risk: (1) You have diabetes OR (2) Family history of glaucoma OR (3)  aged 48 and older OR (3)  American aged 72 and older  5  Osteoporosis Screening: covered every 2 years if you meet one of the following conditions: (1) You're estrogen deficient and at risk for osteoporosis based off medical history and other findings; (2) Have a vertebral abnormality; (3) On glucocorticoid therapy for more than 3 months; (4) Have primary hyperparathyroidism; (5) On osteoporosis medications and need to assess response to drug therapy  · Last bone density test (DXA Scan): Not on file  10  HIV Screening: covered annually if you're between the age of 12-76  Also covered annually if you are younger than 13 and older than 72 with risk factors for HIV infection  For pregnant patients, it is covered up to 3 times per pregnancy  Immunizations:  Immunization Recommendations   Influenza Vaccine Annual influenza vaccination during flu season is recommended for all persons aged >= 6 months who do not have contraindications   Pneumococcal Vaccine (Prevnar and Pneumovax)  * Prevnar = PCV13  * Pneumovax = PPSV23   Adults 25-60 years old: 1-3 doses may be recommended based on certain risk factors  Adults 72 years old: Prevnar (PCV13) vaccine recommended followed by Pneumovax (PPSV23) vaccine  If already received PPSV23 since turning 65, then PCV13 recommended at least one year after PPSV23 dose     Hepatitis B Vaccine 3 dose series if at intermediate or high risk (ex: diabetes, end stage renal disease, liver disease)   Tetanus (Td) Vaccine - COST NOT COVERED BY MEDICARE PART B Following completion of primary series, a booster dose should be given every 10 years to maintain immunity against tetanus  Td may also be given as tetanus wound prophylaxis  Tdap Vaccine - COST NOT COVERED BY MEDICARE PART B Recommended at least once for all adults  For pregnant patients, recommended with each pregnancy  Shingles Vaccine (Shingrix) - COST NOT COVERED BY MEDICARE PART B  2 shot series recommended in those aged 48 and above     Health Maintenance Due:      Topic Date Due    Colorectal Cancer Screening  01/07/2021     Immunizations Due:      Topic Date Due    DTaP,Tdap,and Td Vaccines (1 - Tdap) Never done    Pneumococcal Vaccine: 65+ Years (1 of 1 - PPSV23) Never done    COVID-19 Vaccine (3 - Booster for Moderna series) 08/09/2021    Influenza Vaccine (1) Never done     Advance Directives   What are advance directives? Advance directives are legal documents that state your wishes and plans for medical care  These plans are made ahead of time in case you lose your ability to make decisions for yourself  Advance directives can apply to any medical decision, such as the treatments you want, and if you want to donate organs  What are the types of advance directives? There are many types of advance directives, and each state has rules about how to use them  You may choose a combination of any of the following:  · Living will: This is a written record of the treatment you want  You can also choose which treatments you do not want, which to limit, and which to stop at a certain time  This includes surgery, medicine, IV fluid, and tube feedings  · Durable power of  for healthcare [facility]): This is a written record that states who you want to make healthcare choices for you when you are unable to make them for yourself  This person, called a proxy, is usually a family member or a friend  You may choose more than 1 proxy    · Do not resuscitate (DNR) order:  A DNR order is used in case your heart stops beating or you stop breathing  It is a request not to have certain forms of treatment, such as CPR  A DNR order may be included in other types of advance directives  · Medical directive: This covers the care that you want if you are in a coma, near death, or unable to make decisions for yourself  You can list the treatments you want for each condition  Treatment may include pain medicine, surgery, blood transfusions, dialysis, IV or tube feedings, and a ventilator (breathing machine)  · Values history: This document has questions about your views, beliefs, and how you feel and think about life  This information can help others choose the care that you would choose  Why are advance directives important? An advance directive helps you control your care  Although spoken wishes may be used, it is better to have your wishes written down  Spoken wishes can be misunderstood, or not followed  Treatments may be given even if you do not want them  An advance directive may make it easier for your family to make difficult choices about your care  Fall Prevention    Fall prevention  includes ways to make your home and other areas safer  It also includes ways you can move more carefully to prevent a fall  Health conditions that cause changes in your blood pressure, vision, or muscle strength and coordination may increase your risk for falls  Medicines may also increase your risk for falls if they make you dizzy, weak, or sleepy  Fall prevention tips:   · Stand or sit up slowly  · Use assistive devices as directed  · Wear shoes that fit well and have soles that   · Wear a personal alarm  · Stay active  · Manage your medical conditions  Home Safety Tips:  · Add items to prevent falls in the bathroom  · Keep paths clear  · Install bright lights in your home  · Keep items you use often on shelves within reach  · Paint or place reflective tape on the edges of your stairs      Urinary Incontinence   Urinary incontinence (UI)  is when you lose control of your bladder  UI develops because your bladder cannot store or empty urine properly  The 3 most common types of UI are stress incontinence, urge incontinence, or both  Medicines:   · May be given to help strengthen your bladder control  Report any side effects of medication to your healthcare provider  Do pelvic muscle exercises often:  Your pelvic muscles help you stop urinating  Squeeze these muscles tight for 5 seconds, then relax for 5 seconds  Gradually work up to squeezing for 10 seconds  Do 3 sets of 15 repetitions a day, or as directed  This will help strengthen your pelvic muscles and improve bladder control  Train your bladder:  Go to the bathroom at set times, such as every 2 hours, even if you do not feel the urge to go  You can also try to hold your urine when you feel the urge to go  For example, hold your urine for 5 minutes when you feel the urge to go  As that becomes easier, hold your urine for 10 minutes  Self-care:   · Keep a UI record  Write down how often you leak urine and how much you leak  Make a note of what you were doing when you leaked urine  · Drink liquids as directed  You may need to limit the amount of liquid you drink to help control your urine leakage  Do not drink any liquid right before you go to bed  Limit or do not have drinks that contain caffeine or alcohol  · Prevent constipation  Eat a variety of high-fiber foods  Good examples are high-fiber cereals, beans, vegetables, and whole-grain breads  Walking is the best way to trigger your intestines to have a bowel movement  · Exercise regularly and maintain a healthy weight  Weight loss and exercise will decrease pressure on your bladder and help you control your leakage  · Use a catheter as directed  to help empty your bladder  A catheter is a tiny, plastic tube that is put into your bladder to drain your urine  · Go to behavior therapy as directed    Behavior therapy may be used to help you learn to control your urge to urinate  © Copyright [employer] 2018 Information is for End User's use only and may not be sold, redistributed or otherwise used for commercial purposes   All illustrations and images included in CareNotes® are the copyrighted property of [employer]  or [address] [place] [address]

## 2022-02-02 NOTE — PROGRESS NOTES
Assessment/Plan:       Will continue the antidepressant since it seems to be helping  For her joint pains and possible rheumatoid, will consult Rheumatology but she is not sure she will go  Also start meloxicam low dose for now  Quality Measures:       Return in about 4 weeks (around 3/2/2022) for Recheck  No problem-specific Assessment & Plan notes found for this encounter  Diagnoses and all orders for this visit:    Medicare annual wellness visit, subsequent    Postmenopausal  -     DXA bone density spine hip and pelvis; Future    Current moderate episode of major depressive disorder without prior episode (HCC)  -     escitalopram (LEXAPRO) 5 mg tablet; Take 1 tablet (5 mg total) by mouth daily    Polyarthritis with positive rheumatoid factor ([facility] [address] )  -     Ambulatory Referral to Rheumatology; Future  -     meloxicam (Mobic) 7 5 mg tablet; Take 1 tablet (7 5 mg total) by mouth daily          Subjective:      Patient ID: Danley Siemens is a [de-identified] y o  female  Patient comes in today for followup  She admits that the antidepressant is starting to work and she has no side effects on it  Her blood work did reveal an elevated rheumatoid factor and CRP  The steroids did work within a day but the joint pains returned as the steroids wore off        ALLERGIES:  Allergies   Allergen Reactions    Sulfa Antibiotics Hives       CURRENT MEDICATIONS:    Current Outpatient Medications:     busPIRone (BUSPAR) 5 mg tablet, , Disp: , Rfl:     escitalopram (LEXAPRO) 5 mg tablet, Take 1 tablet (5 mg total) by mouth daily, Disp: 90 tablet, Rfl: 1    fluticasone (FLONASE) 50 mcg/act nasal spray, SHAKE LIQUID AND USE 1 SPRAY IN EACH NOSTRIL DAILY, Disp: 48 g, Rfl: 3    levothyroxine 75 mcg tablet, TAKE 1 TABLET BY MOUTH  DAILY, Disp: 90 tablet, Rfl: 3    lisinopril (ZESTRIL) 5 mg tablet, TAKE 1 TABLET BY MOUTH  DAILY, Disp: 90 tablet, Rfl: 3    Lumigan 0 01 % ophthalmic drops, , Disp: , Rfl:     meloxicam (Mobic) 7 5 mg tablet, Take 1 tablet (7 5 mg total) by mouth daily, Disp: 90 tablet, Rfl: 1    ACTIVE PROBLEM LIST:  Patient Active Problem List   Diagnosis    Allergic rhinitis    Hyperlipidemia    Hypertension, benign    Hypothyroidism    Vitamin D deficiency    Depression    Current moderate episode of major depressive disorder without prior episode ([facility] [address] )       PAST MEDICAL HISTORY:  Past Medical History:   Diagnosis Date    Depression 12/30/2021    Lumbar stenosis     Transient global amnesia        PAST SURGICAL HISTORY:  No past surgical history on file  FAMILY HISTORY:  Family History   Problem Relation Age of Onset    Stroke Mother 61        CVA    Pancreatic cancer Father     Hyperlipidemia Sister        SOCIAL HISTORY:  Social History     Socioeconomic History    Marital status: /Civil Union     Spouse name: Not on file    Number of children: 3    Years of education: Not on file    Highest education level: Not on file   Occupational History    Occupation: [employer]     Comment: Full time employment   Tobacco Use    Smoking status: Former Smoker    Smokeless tobacco: Never Used   Substance and Sexual Activity    Alcohol use: Yes     Comment: glass white wine with dinner    Drug use: No    Sexual activity: Not on file   Other Topics Concern    Not on file   Social History Narrative    Not on file     Social Determinants of Health     Financial Resource Strain: Not on file   Food Insecurity: Not on file   Transportation Needs: Not on file   Physical Activity: Not on file   Stress: Not on file   Social Connections: Not on file   Intimate Partner Violence: Not on file   Housing Stability: Not on file       Review of Systems   Respiratory: Negative for shortness of breath  Cardiovascular: Negative for chest pain  Gastrointestinal: Negative for abdominal pain           Objective:  Vitals:    02/02/22 1315   BP: 120/78   BP Location: Left arm   Patient Position: Sitting   Cuff Size: Adult   Pulse: 87   Temp: 98 °F (36 7 °C)   TempSrc: Tympanic   SpO2: 98%   Weight: 58 5 kg (129 lb)   Height: 5' 2" (1 575 m)     Body mass index is 23 59 kg/m²  Physical Exam  Vitals and nursing note reviewed  Constitutional:       Appearance: Normal appearance  Neurological:      Mental Status: She is alert     Psychiatric:         Mood and Affect: Mood normal          Behavior: Behavior normal            RESULTS:    Recent Results (from the past 1008 hour(s))   CBC and differential    Collection Time: 12/31/21 10:18 AM   Result Value Ref Range    WBC 8 41 4 31 - 10 16 Thousand/uL    RBC 4 34 3 81 - 5 12 Million/uL    Hemoglobin 13 2 11 5 - 15 4 g/dL    Hematocrit 40 9 34 8 - 46 1 %    MCV 94 82 - 98 fL    MCH 30 4 26 8 - 34 3 pg    MCHC 32 3 31 4 - 37 4 g/dL    RDW 12 2 11 6 - 15 1 %    MPV 9 4 8 9 - 12 7 fL    Platelets 845 840 - 180 Thousands/uL    nRBC 0 /100 WBCs    Neutrophils Relative 62 43 - 75 %    Immat GRANS % 1 0 - 2 %    Lymphocytes Relative 25 14 - 44 %    Monocytes Relative 9 4 - 12 %    Eosinophils Relative 2 0 - 6 %    Basophils Relative 1 0 - 1 %    Neutrophils Absolute 5 21 1 85 - 7 62 Thousands/µL    Immature Grans Absolute 0 04 0 00 - 0 20 Thousand/uL    Lymphocytes Absolute 2 12 0 60 - 4 47 Thousands/µL    Monocytes Absolute 0 79 0 17 - 1 22 Thousand/µL    Eosinophils Absolute 0 16 0 00 - 0 61 Thousand/µL    Basophils Absolute 0 09 0 00 - 0 10 Thousands/µL   Comprehensive metabolic panel    Collection Time: 12/31/21 10:18 AM   Result Value Ref Range    Sodium 134 (L) 136 - 145 mmol/L    Potassium 4 1 3 5 - 5 3 mmol/L    Chloride 102 100 - 108 mmol/L    CO2 28 21 - 32 mmol/L    ANION GAP 4 4 - 13 mmol/L    BUN 12 5 - 25 mg/dL    Creatinine 0 74 0 60 - 1 30 mg/dL    Glucose, Fasting 81 65 - 99 mg/dL    Calcium 9 4 8 3 - 10 1 mg/dL    Corrected Calcium 10 0 8 3 - 10 1 mg/dL    AST 18 5 - 45 U/L    ALT 28 12 - 78 U/L    Alkaline Phosphatase 101 46 - 116 U/L    Total Protein 7 8 6 4 - 8 2 g/dL    Albumin 3 2 (L) 3 5 - 5 0 g/dL    Total Bilirubin 0 46 0 20 - 1 00 mg/dL    eGFR 77 ml/min/1 73sq m   TERRY Screen w/ Reflex to Titer/Pattern    Collection Time: 12/31/21 10:18 AM   Result Value Ref Range    TERRY Negative Negative   RF Screen w/ Reflex to Titer    Collection Time: 12/31/21 10:18 AM   Result Value Ref Range    Rheumatoid Factor Positive (A) Negative   Cyclic citrul peptide antibody, IgG    Collection Time: 12/31/21 10:18 AM   Result Value Ref Range    Cyclic Citrullinated Peptide Ab  2 2 See comment   Lyme Total Antibody Profile with reflex to WB    Collection Time: 12/31/21 10:18 AM   Result Value Ref Range    Lyme total antibody 121 (H) 0 00 - 119   Uric acid    Collection Time: 12/31/21 10:18 AM   Result Value Ref Range    Uric Acid 4 4 2 0 - 6 8 mg/dL   C-reactive protein    Collection Time: 12/31/21 10:18 AM   Result Value Ref Range    CRP 39 3 (H) <3 0 mg/L   T4, free    Collection Time: 12/31/21 10:18 AM   Result Value Ref Range    Free T4 1 48 (H) 0 76 - 1 46 ng/dL   TSH, 3rd generation    Collection Time: 12/31/21 10:18 AM   Result Value Ref Range    TSH 3RD GENERATON 1 860 0 358 - 3 740 uIU/mL   Lipid panel    Collection Time: 12/31/21 10:18 AM   Result Value Ref Range    Cholesterol 162 See Comment mg/dL    Triglycerides 84 See Comment mg/dL    HDL, Direct 49 (L) >=50 mg/dL    LDL Calculated 96 0 - 100 mg/dL    Non-HDL-Chol (CHOL-HDL) 113 mg/dl   Lyme Western Blot, Serum    Collection Time: 12/31/21 10:18 AM   Result Value Ref Range    Lyme 18 kD IgG Absent     Lyme 23 kD IgG Absent     Lyme 28 kD IgG Absent     Lyme 30 kD IgG Absent     Lyme 39 kD IgG Absent     Lyme 41 kD IgG Absent     Lyme 45 kD IgG Absent     Lyme 58 kD IgG Absent     Lyme 66 kD IgG Absent     Lyme 93 kD IgG Absent     Lyme 23 kD IgM Absent     Lyme 39 kD IgM Absent     Lyme 41 kD IgM Absent     Lyme IgG WB Interp  Negative     Lyme IgM WB Interp   Negative    Rheumatoid factor quant (Reflex Only- Do Not Order)    Collection Time: 12/31/21 10:18 AM   Result Value Ref Range    RF Quantitation 32 IU/mL (A) (none)       This note was created with voice recognition software  Phonic, grammatical and spelling errors may be present within the note as a result

## 2022-02-02 NOTE — PROGRESS NOTES
Assessment and Plan:     Problem List Items Addressed This Visit     None           Preventive health issues were discussed with patient, and age appropriate screening tests were ordered as noted in patient's After Visit Summary  Personalized health advice and appropriate referrals for health education or preventive services given if needed, as noted in patient's After Visit Summary  History of Present Illness:     Patient presents for Medicare Annual Wellness visit    Patient Care Team:  Elver Cifuentes MD as PCP - General     Problem List:     Patient Active Problem List   Diagnosis    Allergic rhinitis    Hyperlipidemia    Hypertension, benign    Hypothyroidism    Vitamin D deficiency    Depression      Past Medical and Surgical History:     Past Medical History:   Diagnosis Date    Depression 12/30/2021    Lumbar stenosis     Transient global amnesia      No past surgical history on file     Family History:     Family History   Problem Relation Age of Onset    Stroke Mother 61        CVA    Pancreatic cancer Father     Hyperlipidemia Sister       Social History:     Social History     Socioeconomic History    Marital status: /Civil Union     Spouse name: None    Number of children: 3    Years of education: None    Highest education level: None   Occupational History    Occupation: [employer] office     Comment: Full time employment   Tobacco Use    Smoking status: Former Smoker    Smokeless tobacco: Never Used   Substance and Sexual Activity    Alcohol use: Yes     Comment: glass white wine with dinner    Drug use: No    Sexual activity: None   Other Topics Concern    None   Social History Narrative    None     Social Determinants of Health     Financial Resource Strain: Not on file   Food Insecurity: Not on file   Transportation Needs: Not on file   Physical Activity: Not on file   Stress: Not on file   Social Connections: Not on file   Intimate Partner Violence: Not on file Housing Stability: Not on file      Medications and Allergies:     Current Outpatient Medications   Medication Sig Dispense Refill    busPIRone (BUSPAR) 5 mg tablet       escitalopram (LEXAPRO) 5 mg tablet Take 1 tablet (5 mg total) by mouth daily 30 tablet 3    fluticasone (FLONASE) 50 mcg/act nasal spray SHAKE LIQUID AND USE 1 SPRAY IN EACH NOSTRIL DAILY 48 g 3    levothyroxine 75 mcg tablet TAKE 1 TABLET BY MOUTH  DAILY 90 tablet 3    lisinopril (ZESTRIL) 5 mg tablet TAKE 1 TABLET BY MOUTH  DAILY 90 tablet 3    Lumigan 0 01 % ophthalmic drops       methylPREDNISolone 4 MG tablet therapy pack Use as directed on package 21 each 0     No current facility-administered medications for this visit  Allergies   Allergen Reactions    Sulfa Antibiotics Hives      Immunizations:     Immunization History   Administered Date(s) Administered    COVID-19 MODERNA VACC 0 5 ML IM 02/10/2021, 03/09/2021      Health Maintenance:         Topic Date Due    Colorectal Cancer Screening  01/07/2021         Topic Date Due    DTaP,Tdap,and Td Vaccines (1 - Tdap) Never done    Pneumococcal Vaccine: 65+ Years (1 of 1 - PPSV23) Never done    COVID-19 Vaccine (3 - Booster for Moderna series) 08/09/2021    Influenza Vaccine (1) Never done      Medicare Health Risk Assessment:     Ht 5' 2" (1 575 m)   BMI 23 74 kg/m²      Yomi Hines is here for her Subsequent Wellness visit  Health Risk Assessment:   Patient rates overall health as good  Patient feels that their physical health rating is slightly worse  Patient is satisfied with their life  Eyesight was rated as slightly worse  Hearing was rated as slightly worse  Patient feels that their emotional and mental health rating is slightly better  Patients states they are sometimes angry  Patient states they are sometimes unusually tired/fatigued  Pain experienced in the last 7 days has been a lot  Patient's pain rating has been 7/10   Patient states that she has experienced no weight loss or gain in last 6 months  Depression Screening:   PHQ-9 Score: 6      Fall Risk Screening: In the past year, patient has experienced: history of falling in past year    Number of falls: 1  Injured during fall?: No    Feels unsteady when standing or walking?: No    Worried about falling?: No      Urinary Incontinence Screening:   Patient has leaked urine accidently in the last six months  Home Safety:  Patient has trouble with stairs inside or outside of their home  Patient has working smoke alarms and has no working carbon monoxide detector  Home safety hazards include: none  Nutrition:   Current diet is Regular  Medications:   Patient is currently taking over-the-counter supplements  OTC medications include: see medication list  Patient is able to manage medications  Activities of Daily Living (ADLs)/Instrumental Activities of Daily Living (IADLs):   Walk and transfer into and out of bed and chair?: Yes  Dress and groom yourself?: Yes    Bathe or shower yourself?: Yes    Feed yourself?  Yes  Do your laundry/housekeeping?: Yes  Manage your money, pay your bills and track your expenses?: Yes  Make your own meals?: Yes    Do your own shopping?: Yes    Previous Hospitalizations:   Any hospitalizations or ED visits within the last 12 months?: No      Advance Care Planning:   Living will: No    Durable POA for healthcare: No    Advanced directive: No    Advanced directive counseling given: Yes      Cognitive Screening:   Provider or family/friend/caregiver concerned regarding cognition?: No    PREVENTIVE SCREENINGS      Cardiovascular Screening:    General: Screening Not Indicated and History Lipid Disorder      Diabetes Screening:     General: Screening Current      Colorectal Cancer Screening:     General: Screening Not Indicated      Breast Cancer Screening:     General: Screening Not Indicated      Cervical Cancer Screening:    General: Screening Not Indicated      Osteoporosis Screening:    General: Risks and Benefits Discussed      Abdominal Aortic Aneurysm (AAA) Screening:        General: Screening Not Indicated      Lung Cancer Screening:     General: Screening Not Indicated    Screening, Brief Intervention, and Referral to Treatment (SBIRT)    Screening  Typical number of drinks in a day: 0  Typical number of drinks in a week: 0  Interpretation: Low risk drinking behavior      AUDIT-C Screenin) How often did you have a drink containing alcohol in the past year? never  2) How many drinks did you have on a typical day when you were drinking in the past year? 0  3) How often did you have 6 or more drinks on one occasion in the past year? never    AUDIT-C Score: 0  Interpretation: Score 0-2 (female): Negative screen for alcohol misuse    Single Item Drug Screening:  How often have you used an illegal drug (including marijuana) or a prescription medication for non-medical reasons in the past year? never    Single Item Drug Screen Score: 0  Interpretation: Negative screen for possible drug use disorder      Eve Koch MD

## 2022-02-03 ENCOUNTER — TELEPHONE (OUTPATIENT)
Dept: INTERNAL MEDICINE CLINIC | Facility: CLINIC | Age: 82
End: 2022-02-03

## 2022-02-03 NOTE — TELEPHONE ENCOUNTER
Any medicine is going to have a possible side effect for diarrhea  Meloxicam is not notorious for that  I would give it a try

## 2022-02-03 NOTE — TELEPHONE ENCOUNTER
Pt is worried the meloxicam may give her more diarrhea then she already experiences  She has a gastric past that gives her diarrhea already  And watches her diet closely for      shes asking if maybe theres an alternative med that would cause stool issues as a side effect

## 2022-02-04 NOTE — TELEPHONE ENCOUNTER
Called and informed patient, she verbally understood  Spoke to Hilary ,we have nothing recent May 2018 labs faxed at her request

## 2022-03-14 ENCOUNTER — OFFICE VISIT (OUTPATIENT)
Dept: INTERNAL MEDICINE CLINIC | Facility: CLINIC | Age: 82
End: 2022-03-14
Payer: MEDICARE

## 2022-03-14 VITALS
HEIGHT: 62 IN | SYSTOLIC BLOOD PRESSURE: 124 MMHG | WEIGHT: 126 LBS | BODY MASS INDEX: 23.19 KG/M2 | OXYGEN SATURATION: 98 % | TEMPERATURE: 98.7 F | HEART RATE: 82 BPM | DIASTOLIC BLOOD PRESSURE: 80 MMHG

## 2022-03-14 DIAGNOSIS — M25.50 ARTHRALGIA, UNSPECIFIED JOINT: Primary | ICD-10-CM

## 2022-03-14 DIAGNOSIS — F32.1 CURRENT MODERATE EPISODE OF MAJOR DEPRESSIVE DISORDER WITHOUT PRIOR EPISODE (HCC): ICD-10-CM

## 2022-03-14 PROCEDURE — 99214 OFFICE O/P EST MOD 30 MIN: CPT | Performed by: INTERNAL MEDICINE

## 2022-03-14 RX ORDER — ESCITALOPRAM OXALATE 10 MG/1
10 TABLET ORAL DAILY
Qty: 90 TABLET | Refills: 1 | Status: SHIPPED | OUTPATIENT
Start: 2022-03-14

## 2022-03-14 RX ORDER — PREDNISONE 1 MG/1
12.5 TABLET ORAL DAILY
Qty: 50 TABLET | Refills: 0 | Status: SHIPPED | OUTPATIENT
Start: 2022-03-14 | End: 2022-03-28 | Stop reason: SDUPTHER

## 2022-03-14 NOTE — PATIENT INSTRUCTIONS
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

## 2022-03-14 NOTE — PROGRESS NOTES
Assessment/Plan:       Will increase her Lexapro  Since her joint issues appear to be localizing to her limb-girdle need to consider polymyalgia rheumatica  Interestingly when I mention this to her, she reported a sister was diagnosed with "fibromyalgia" but treated with steroids for months? More likely her sister had PMR  She is willing to try steroids  Will start her at 12 5 mg daily for 2 weeks  Reassess inflammatory markers and follow up here after that  Stop the meloxicam      Quality Measures:       Return in about 2 weeks (around 3/28/2022)  No problem-specific Assessment & Plan notes found for this encounter  Diagnoses and all orders for this visit:    Arthralgia, unspecified joint  -     C-reactive protein; Future  -     Sedimentation rate, automated; Future  -     predniSONE 5 mg tablet; Take 3 tablets (15 mg total) by mouth daily    Current moderate episode of major depressive disorder without prior episode (HCC)  -     escitalopram (LEXAPRO) 10 mg tablet; Take 1 tablet (10 mg total) by mouth daily          Subjective:      Patient ID: Rob Adams is a [de-identified] y o  female  Patient comes in today for follow-up  She states the Lexapro is helping but she would be willing to try a slightly higher dose  She states the meloxicam helped somewhat for her joints  She focuses on her shoulders and now admits sometimes it is hard to get up, she admits it is hard to get up from a chair  She refuses to go to the rheumatologist   Too far away        ALLERGIES:  Allergies   Allergen Reactions    Sulfa Antibiotics Hives       CURRENT MEDICATIONS:    Current Outpatient Medications:     escitalopram (LEXAPRO) 10 mg tablet, Take 1 tablet (10 mg total) by mouth daily, Disp: 90 tablet, Rfl: 1    fluticasone (FLONASE) 50 mcg/act nasal spray, SHAKE LIQUID AND USE 1 SPRAY IN EACH NOSTRIL DAILY, Disp: 48 g, Rfl: 3    levothyroxine 75 mcg tablet, TAKE 1 TABLET BY MOUTH  DAILY, Disp: 90 tablet, Rfl: 3   lisinopril (ZESTRIL) 5 mg tablet, TAKE 1 TABLET BY MOUTH  DAILY, Disp: 90 tablet, Rfl: 3    Lumigan 0 01 % ophthalmic drops, , Disp: , Rfl:     predniSONE 5 mg tablet, Take 3 tablets (15 mg total) by mouth daily, Disp: 50 tablet, Rfl: 0    ACTIVE PROBLEM LIST:  Patient Active Problem List   Diagnosis    Allergic rhinitis    Hyperlipidemia    Hypertension, benign    Hypothyroidism    Vitamin D deficiency    Depression    Current moderate episode of major depressive disorder without prior episode (HCC)    Polyarthritis with positive rheumatoid factor (HCC)       PAST MEDICAL HISTORY:  Past Medical History:   Diagnosis Date    Depression 12/30/2021    Lumbar stenosis     Transient global amnesia        PAST SURGICAL HISTORY:  No past surgical history on file  FAMILY HISTORY:  Family History   Problem Relation Age of Onset    Stroke Mother 61        CVA    Pancreatic cancer Father     Hyperlipidemia Sister        SOCIAL HISTORY:  Social History     Socioeconomic History    Marital status: /Civil Union     Spouse name: Not on file    Number of children: 3    Years of education: Not on file    Highest education level: Not on file   Occupational History    Occupation: [employer] office     Comment: Full time employment   Tobacco Use    Smoking status: Former Smoker    Smokeless tobacco: Never Used   Substance and Sexual Activity    Alcohol use: Yes     Comment: glass white wine with dinner    Drug use: No    Sexual activity: Not on file   Other Topics Concern    Not on file   Social History Narrative    Not on file     Social Determinants of Health     Financial Resource Strain: Not on file   Food Insecurity: Not on file   Transportation Needs: Not on file   Physical Activity: Not on file   Stress: Not on file   Social Connections: Not on file   Intimate Partner Violence: Not on file   Housing Stability: Not on file       Review of Systems   Musculoskeletal: Positive for arthralgias  Objective:  Vitals:    03/14/22 1544   BP: 124/80   BP Location: Left arm   Patient Position: Sitting   Cuff Size: Adult   Pulse: 82   Temp: 98 7 °F (37 1 °C)   TempSrc: Temporal   SpO2: 98%   Weight: 57 2 kg (126 lb)   Height: 5' 2" (1 575 m)     Body mass index is 23 05 kg/m²  Physical Exam  Vitals and nursing note reviewed  Constitutional:       Appearance: Normal appearance  Neurological:      Mental Status: She is alert  RESULTS:    No results found for this or any previous visit (from the past 1008 hour(s))  This note was created with voice recognition software  Phonic, grammatical and spelling errors may be present within the note as a result

## 2022-03-16 ENCOUNTER — APPOINTMENT (OUTPATIENT)
Dept: LAB | Facility: CLINIC | Age: 82
End: 2022-03-16
Payer: MEDICARE

## 2022-03-16 DIAGNOSIS — M25.50 ARTHRALGIA, UNSPECIFIED JOINT: ICD-10-CM

## 2022-03-16 LAB
CRP SERPL QL: 9.8 MG/L
ERYTHROCYTE [SEDIMENTATION RATE] IN BLOOD: 19 MM/HOUR (ref 0–29)

## 2022-03-16 PROCEDURE — 86140 C-REACTIVE PROTEIN: CPT

## 2022-03-16 PROCEDURE — 85652 RBC SED RATE AUTOMATED: CPT

## 2022-03-16 PROCEDURE — 36415 COLL VENOUS BLD VENIPUNCTURE: CPT

## 2022-03-28 ENCOUNTER — OFFICE VISIT (OUTPATIENT)
Dept: INTERNAL MEDICINE CLINIC | Facility: CLINIC | Age: 82
End: 2022-03-28
Payer: MEDICARE

## 2022-03-28 VITALS
HEIGHT: 62 IN | WEIGHT: 130 LBS | HEART RATE: 68 BPM | OXYGEN SATURATION: 98 % | TEMPERATURE: 98 F | SYSTOLIC BLOOD PRESSURE: 130 MMHG | DIASTOLIC BLOOD PRESSURE: 70 MMHG | BODY MASS INDEX: 23.92 KG/M2

## 2022-03-28 DIAGNOSIS — F32.1 CURRENT MODERATE EPISODE OF MAJOR DEPRESSIVE DISORDER WITHOUT PRIOR EPISODE (HCC): ICD-10-CM

## 2022-03-28 DIAGNOSIS — M25.50 ARTHRALGIA, UNSPECIFIED JOINT: Primary | ICD-10-CM

## 2022-03-28 PROCEDURE — 99213 OFFICE O/P EST LOW 20 MIN: CPT | Performed by: INTERNAL MEDICINE

## 2022-03-28 RX ORDER — PREDNISONE 1 MG/1
10 TABLET ORAL DAILY
Qty: 60 TABLET | Refills: 0 | Status: SHIPPED | OUTPATIENT
Start: 2022-03-28 | End: 2022-05-03 | Stop reason: SDUPTHER

## 2022-03-28 RX ORDER — LORATADINE 10 MG/1
10 TABLET ORAL DAILY
COMMUNITY

## 2022-03-28 NOTE — PROGRESS NOTES
Assessment/Plan:       Making progress  Will reduce her prednisone slightly  After 2 weeks on 10 mg, she will reduce to 7 5 mg  Continue the Lexapro at 10 mg until follow-up  Quality Measures:       Return in about 4 weeks (around 4/25/2022) for Recheck  No problem-specific Assessment & Plan notes found for this encounter  Diagnoses and all orders for this visit:    Arthralgia, unspecified joint  -     predniSONE 5 mg tablet; Take 2 tablets (10 mg total) by mouth daily    Other orders  -     loratadine (Claritin) 10 mg tablet; Take 10 mg by mouth daily          Subjective:      Patient ID: Arsh Quiroga is a [de-identified] y o  female  Patient comes in today for follow-up  She states the prednisone is helping  Her shoulders are no longer hurting  Hips/buttocks feeling much better  No side effects  Inflammatory markers are decreasing  She is not sure if the Lexapro at the higher dose is helping however        ALLERGIES:  Allergies   Allergen Reactions    Sulfa Antibiotics Hives       CURRENT MEDICATIONS:    Current Outpatient Medications:     escitalopram (LEXAPRO) 10 mg tablet, Take 1 tablet (10 mg total) by mouth daily, Disp: 90 tablet, Rfl: 1    fluticasone (FLONASE) 50 mcg/act nasal spray, SHAKE LIQUID AND USE 1 SPRAY IN EACH NOSTRIL DAILY, Disp: 48 g, Rfl: 3    levothyroxine 75 mcg tablet, TAKE 1 TABLET BY MOUTH  DAILY, Disp: 90 tablet, Rfl: 3    lisinopril (ZESTRIL) 5 mg tablet, TAKE 1 TABLET BY MOUTH  DAILY, Disp: 90 tablet, Rfl: 3    loratadine (Claritin) 10 mg tablet, Take 10 mg by mouth daily, Disp: , Rfl:     Lumigan 0 01 % ophthalmic drops, , Disp: , Rfl:     predniSONE 5 mg tablet, Take 2 tablets (10 mg total) by mouth daily, Disp: 60 tablet, Rfl: 0    ACTIVE PROBLEM LIST:  Patient Active Problem List   Diagnosis    Allergic rhinitis    Hyperlipidemia    Hypertension, benign    Hypothyroidism    Vitamin D deficiency    Depression    Current moderate episode of major depressive disorder without prior episode ([facility] )    Polyarthritis with positive rheumatoid factor ([facility] )       PAST MEDICAL HISTORY:  Past Medical History:   Diagnosis Date    Depression 12/30/2021    Lumbar stenosis     Transient global amnesia        PAST SURGICAL HISTORY:  No past surgical history on file  FAMILY HISTORY:  Family History   Problem Relation Age of Onset    Stroke Mother 61        CVA    Pancreatic cancer Father     Hyperlipidemia Sister        SOCIAL HISTORY:  Social History     Socioeconomic History    Marital status: /Civil Union     Spouse name: Not on file    Number of children: 3    Years of education: Not on file    Highest education level: Not on file   Occupational History    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     Comment: Full time employment   Tobacco Use    Smoking status: Former Smoker    Smokeless tobacco: Never Used   Substance and Sexual Activity    Alcohol use: Yes     Comment: glass white wine with dinner    Drug use: No    Sexual activity: Not on file   Other Topics Concern    Not on file   Social History Narrative    Not on file     Social Determinants of Health     Financial Resource Strain: Not on file   Food Insecurity: Not on file   Transportation Needs: Not on file   Physical Activity: Not on file   Stress: Not on file   Social Connections: Not on file   Intimate Partner Violence: Not on file   Housing Stability: Not on file       Review of Systems   Respiratory: Negative for shortness of breath  Cardiovascular: Negative for chest pain  Gastrointestinal: Negative for abdominal pain  Objective:  Vitals:    03/28/22 1427   BP: 130/70   BP Location: Left arm   Patient Position: Sitting   Cuff Size: Adult   Pulse: 68   Temp: 98 °F (36 7 °C)   TempSrc: Tympanic   SpO2: 98%   Weight: 59 kg (130 lb)   Height: 5' 2" (1 575 m)     Body mass index is 23 78 kg/m²  Physical Exam  Vitals and nursing note reviewed     Constitutional: Appearance: Normal appearance  Neurological:      Mental Status: She is alert  RESULTS:    Recent Results (from the past 1008 hour(s))   C-reactive protein    Collection Time: 03/16/22  1:01 PM   Result Value Ref Range    CRP 9 8 (H) <3 0 mg/L   Sedimentation rate, automated    Collection Time: 03/16/22  1:01 PM   Result Value Ref Range    Sed Rate 19 0 - 29 mm/hour       This note was created with voice recognition software  Phonic, grammatical and spelling errors may be present within the note as a result

## 2022-04-25 ENCOUNTER — TELEPHONE (OUTPATIENT)
Dept: INTERNAL MEDICINE CLINIC | Facility: CLINIC | Age: 82
End: 2022-04-25

## 2022-04-25 NOTE — TELEPHONE ENCOUNTER
Please find out how long she has been on the 5 mg tablet    If it is over 2 weeks she can then go down to 2 5 mg daily, and then we can discuss further with her at her follow-up visit

## 2022-04-25 NOTE — TELEPHONE ENCOUNTER
Spoke with the patient  She informed me that it has been at least 2 weeks of being on the 5MG  She will drop down to 2 5MG and follow up further in office next Monday

## 2022-04-25 NOTE — TELEPHONE ENCOUNTER
She needs directions how to wean off her prednizone medication  Can we call and advise?      shes down to 5 mg tablets currently  Has appt in may

## 2022-05-03 ENCOUNTER — OFFICE VISIT (OUTPATIENT)
Dept: INTERNAL MEDICINE CLINIC | Facility: CLINIC | Age: 82
End: 2022-05-03
Payer: MEDICARE

## 2022-05-03 VITALS
SYSTOLIC BLOOD PRESSURE: 118 MMHG | WEIGHT: 132.6 LBS | TEMPERATURE: 97.5 F | HEART RATE: 81 BPM | HEIGHT: 62 IN | OXYGEN SATURATION: 97 % | BODY MASS INDEX: 24.4 KG/M2 | DIASTOLIC BLOOD PRESSURE: 86 MMHG

## 2022-05-03 DIAGNOSIS — M25.50 ARTHRALGIA, UNSPECIFIED JOINT: ICD-10-CM

## 2022-05-03 DIAGNOSIS — M05.80 POLYARTHRITIS WITH POSITIVE RHEUMATOID FACTOR (HCC): Primary | ICD-10-CM

## 2022-05-03 DIAGNOSIS — F32.1 CURRENT MODERATE EPISODE OF MAJOR DEPRESSIVE DISORDER WITHOUT PRIOR EPISODE (HCC): ICD-10-CM

## 2022-05-03 PROCEDURE — 99213 OFFICE O/P EST LOW 20 MIN: CPT | Performed by: PHYSICIAN ASSISTANT

## 2022-05-03 RX ORDER — FEXOFENADINE HCL 180 MG/1
180 TABLET ORAL DAILY
COMMUNITY
End: 2022-08-08 | Stop reason: ALTCHOICE

## 2022-05-03 RX ORDER — PREDNISONE 1 MG/1
5 TABLET ORAL DAILY
Qty: 30 TABLET | Refills: 0 | Status: SHIPPED | OUTPATIENT
Start: 2022-05-03 | End: 2022-08-08 | Stop reason: ALTCHOICE

## 2022-05-03 NOTE — PROGRESS NOTES
Assessment/Plan:   Patient Instructions   Continue 5 mg of prednisone until pain-free  Then reduce to 2 5 mg daily  Continue other medications the same  Schedule follow-up in 1 month to reassess  Quality Measures:       Return in about 4 weeks (around 5/31/2022) for Recheck-Dr MATOS [facility]  Diagnoses and all orders for this visit:    Polyarthritis with positive rheumatoid factor (HCC)    Arthralgia, unspecified joint    Current moderate episode of major depressive disorder without prior episode ([address] )    Other orders  -     fexofenadine (ALLEGRA) 180 MG tablet; Take 180 mg by mouth daily          Subjective:      Patient ID: Luz Jiménez is a [de-identified] y o  female  Follow-up    Patient had been placed on prednisone 5 mg daily for polyarthropathy with positive rheumatoid factor  She had been doing well, contacted the office and her prednisone was reduced to 2 5 mg  She then developed right hip and groin pain but after a couple days realized that this was due to the fact that she had her right hip internally rotated as she was shaving a callus and cutting her toenails  Her right hip then became painful radiating to the groin  She then increase the prednisone again to 5 mg daily which she is taking at this time  Her upper shoulder girdle discomfort has improved significantly  She is now able to do her ADLs without any difficulty  Patient reports increase dose of her escitalopram is more effective and wants to continue        ALLERGIES:  Allergies   Allergen Reactions    Sulfa Antibiotics Hives       CURRENT MEDICATIONS:    Current Outpatient Medications:     escitalopram (LEXAPRO) 10 mg tablet, Take 1 tablet (10 mg total) by mouth daily, Disp: 90 tablet, Rfl: 1    fexofenadine (ALLEGRA) 180 MG tablet, Take 180 mg by mouth daily, Disp: , Rfl:     fluticasone (FLONASE) 50 mcg/act nasal spray, SHAKE LIQUID AND USE 1 SPRAY IN EACH NOSTRIL DAILY, Disp: 48 g, Rfl: 3    levothyroxine 75 mcg tablet, TAKE 1 TABLET BY MOUTH  DAILY, Disp: 90 tablet, Rfl: 3    lisinopril (ZESTRIL) 5 mg tablet, TAKE 1 TABLET BY MOUTH  DAILY, Disp: 90 tablet, Rfl: 3    Lumigan 0 01 % ophthalmic drops, , Disp: , Rfl:     predniSONE 5 mg tablet, Take 2 tablets (10 mg total) by mouth daily, Disp: 60 tablet, Rfl: 0    loratadine (Claritin) 10 mg tablet, Take 10 mg by mouth daily (Patient not taking: Reported on 5/3/2022 ), Disp: , Rfl:     ACTIVE PROBLEM LIST:  Patient Active Problem List   Diagnosis    Allergic rhinitis    Hyperlipidemia    Hypertension, benign    Hypothyroidism    Vitamin D deficiency    Depression    Current moderate episode of major depressive disorder without prior episode (HCC)    Polyarthritis with positive rheumatoid factor (HCC)       PAST MEDICAL HISTORY:  Past Medical History:   Diagnosis Date    Depression 12/30/2021    Lumbar stenosis     Transient global amnesia        PAST SURGICAL HISTORY:  History reviewed  No pertinent surgical history      FAMILY HISTORY:  Family History   Problem Relation Age of Onset    Stroke Mother 61        CVA    Pancreatic cancer Father     Hyperlipidemia Sister        SOCIAL HISTORY:  Social History     Socioeconomic History    Marital status: /Civil Union     Spouse name: Not on file    Number of children: 3    Years of education: Not on file    Highest education level: Not on file   Occupational History    Occupation: [employer] office     Comment: Full time employment   Tobacco Use    Smoking status: Former Smoker    Smokeless tobacco: Never Used   Substance and Sexual Activity    Alcohol use: Yes     Comment: glass white wine with dinner    Drug use: No    Sexual activity: Not on file   Other Topics Concern    Not on file   Social History Narrative    Not on file     Social Determinants of Health     Financial Resource Strain: Not on file   Food Insecurity: Not on file   Transportation Needs: Not on file   Physical Activity: Not on file Stress: Not on file   Social Connections: Not on file   Intimate Partner Violence: Not on file   Housing Stability: Not on file       Review of Systems   Constitutional: Negative for activity change, chills, fatigue and fever  HENT: Negative for congestion  Eyes: Negative for discharge  Respiratory: Negative for cough, chest tightness and shortness of breath  Cardiovascular: Negative for chest pain, palpitations and leg swelling  Gastrointestinal: Negative for abdominal pain  Genitourinary: Negative for difficulty urinating  Musculoskeletal: Positive for arthralgias  Negative for myalgias  Skin: Negative for rash  Allergic/Immunologic: Negative for immunocompromised state  Neurological: Negative for dizziness, syncope, weakness, light-headedness and headaches  Hematological: Negative for adenopathy  Does not bruise/bleed easily  Psychiatric/Behavioral: Negative for dysphoric mood  The patient is not nervous/anxious  Objective:  Vitals:    05/03/22 1544   BP: 118/86   BP Location: Right arm   Patient Position: Sitting   Cuff Size: Adult   Pulse: 81   Temp: 97 5 °F (36 4 °C)   TempSrc: Tympanic   SpO2: 97%   Weight: 60 1 kg (132 lb 9 6 oz)   Height: 5' 2" (1 575 m)     Body mass index is 24 25 kg/m²  Physical Exam  Vitals and nursing note reviewed  Constitutional:       General: She is not in acute distress  Appearance: She is well-developed  HENT:      Head: Normocephalic and atraumatic  Eyes:      Extraocular Movements: Extraocular movements intact  Pupils: Pupils are equal, round, and reactive to light  Neck:      Thyroid: No thyromegaly  Vascular: No carotid bruit or JVD  Cardiovascular:      Rate and Rhythm: Normal rate and regular rhythm  Heart sounds: Normal heart sounds  Pulmonary:      Effort: Pulmonary effort is normal  No respiratory distress  Breath sounds: Normal breath sounds  Musculoskeletal:      Cervical back: Neck supple  Right lower leg: No edema  Left lower leg: No edema  Comments: No greater trochanter tenderness of the right hip  Tenderness on internal rotation of the right hip  Lymphadenopathy:      Cervical: No cervical adenopathy  Skin:     General: Skin is warm and dry  Findings: No rash  Neurological:      General: No focal deficit present  Mental Status: She is alert and oriented to person, place, and time  Mental status is at baseline  Psychiatric:         Mood and Affect: Mood normal          Behavior: Behavior normal            RESULTS:    No results found for this or any previous visit (from the past 1008 hour(s))  This note was created with voice recognition software  Phonic, grammatical and spelling errors may be present within the note as a result

## 2022-05-03 NOTE — PATIENT INSTRUCTIONS
Continue 5 mg of prednisone until pain-free  Then reduce to 2 5 mg daily  Continue other medications the same  Schedule follow-up in 1 month to reassess

## 2022-06-21 ENCOUNTER — TELEPHONE (OUTPATIENT)
Dept: INTERNAL MEDICINE CLINIC | Facility: CLINIC | Age: 82
End: 2022-06-21

## 2022-06-21 NOTE — TELEPHONE ENCOUNTER
Please have patient take the half tablet every other day for 10 days then stop  As long as she is feeling well

## 2022-06-21 NOTE — TELEPHONE ENCOUNTER
Patient was given 5 mg tablet of Prednisone taking on pill a day and then to cut it down to  5 mg a day  Patient has been taking the half pill for about 3 to 4 wks and wants to know if and when she should stop it? Please advise    Unk Gabbie

## 2022-07-19 ENCOUNTER — HOSPITAL ENCOUNTER (OUTPATIENT)
Dept: BONE DENSITY | Facility: CLINIC | Age: 82
Discharge: HOME | End: 2022-07-19
Payer: MEDICARE

## 2022-07-19 DIAGNOSIS — Z78.0 POSTMENOPAUSAL: ICD-10-CM

## 2022-07-19 PROCEDURE — 77080 DXA BONE DENSITY AXIAL: CPT

## 2022-08-08 ENCOUNTER — OFFICE VISIT (OUTPATIENT)
Dept: INTERNAL MEDICINE CLINIC | Facility: CLINIC | Age: 82
End: 2022-08-08
Payer: MEDICARE

## 2022-08-08 VITALS
HEART RATE: 77 BPM | TEMPERATURE: 98.7 F | DIASTOLIC BLOOD PRESSURE: 70 MMHG | BODY MASS INDEX: 24.73 KG/M2 | HEIGHT: 62 IN | WEIGHT: 134.4 LBS | SYSTOLIC BLOOD PRESSURE: 124 MMHG | OXYGEN SATURATION: 94 % | RESPIRATION RATE: 16 BRPM

## 2022-08-08 DIAGNOSIS — I10 HYPERTENSION, BENIGN: ICD-10-CM

## 2022-08-08 DIAGNOSIS — E55.9 VITAMIN D DEFICIENCY: ICD-10-CM

## 2022-08-08 DIAGNOSIS — Z01.818 PRE-OP EXAMINATION: Primary | ICD-10-CM

## 2022-08-08 DIAGNOSIS — H25.13 NUCLEAR SCLEROTIC CATARACT OF BOTH EYES: ICD-10-CM

## 2022-08-08 DIAGNOSIS — E03.9 ACQUIRED HYPOTHYROIDISM: ICD-10-CM

## 2022-08-08 PROCEDURE — 99214 OFFICE O/P EST MOD 30 MIN: CPT | Performed by: INTERNAL MEDICINE

## 2022-08-08 NOTE — PROGRESS NOTES
Presurgical Evaluation    Subjective:      Patient ID: Chuck London is a [de-identified] y o  female  Chief Complaint   Patient presents with    Pre-op Exam        Patient comes in today for preop evaluation for bilateral cataract surgery secondary to worsening vision  The following portions of the patient's history were reviewed and updated as appropriate: allergies, current medications, past family history, past medical history, past social history, past surgical history and problem list     Procedure date: 8/16, 9/6    Surgeon:  Dr Fidelina Avendano procedure:  Cataract extraction  Diagnosis for procedure:  Cataract    Prior anesthesia: Yes   General; Complications:  None / Tolerated well    CAD History: None   NOTE: Patient should see Cardiology if time available before surgery, and if appropriate  Pulmonary History: None    Renal history: None    Diabetes History:  None     Neurological History: None     On Immunosuppressant meds/biologics: No      Review of Systems   Respiratory: Negative for shortness of breath  Cardiovascular: Negative for chest pain  Gastrointestinal: Negative for abdominal pain  Current Outpatient Medications   Medication Sig Dispense Refill    escitalopram (LEXAPRO) 10 mg tablet Take 1 tablet (10 mg total) by mouth daily 90 tablet 1    fluticasone (FLONASE) 50 mcg/act nasal spray SHAKE LIQUID AND USE 1 SPRAY IN EACH NOSTRIL DAILY 48 g 3    levothyroxine 75 mcg tablet TAKE 1 TABLET BY MOUTH  DAILY 90 tablet 3    lisinopril (ZESTRIL) 5 mg tablet TAKE 1 TABLET BY MOUTH  DAILY 90 tablet 3    loratadine (CLARITIN) 10 mg tablet Take 10 mg by mouth daily      Lumigan 0 01 % ophthalmic drops        No current facility-administered medications for this visit         Allergies on file:   Sulfa antibiotics    Patient Active Problem List   Diagnosis    Allergic rhinitis    Hyperlipidemia    Hypertension, benign    Hypothyroidism    Vitamin D deficiency    Depression    Current moderate episode of major depressive disorder without prior episode ([address] )    Polyarthritis with positive rheumatoid factor (HCC)        Past Medical History:   Diagnosis Date    Depression 12/30/2021    Lumbar stenosis     Transient global amnesia        History reviewed  No pertinent surgical history  Family History   Problem Relation Age of Onset    Stroke Mother 61        CVA    Pancreatic cancer Father     Hyperlipidemia Sister        Social History     Tobacco Use    Smoking status: Former Smoker    Smokeless tobacco: Never Used   Vaping Use    Vaping Use: Never used   Substance Use Topics    Alcohol use: Yes     Comment: glass white wine with dinner    Drug use: No       Objective:    Vitals:    08/08/22 1327   BP: 124/70   BP Location: Left arm   Patient Position: Sitting   Cuff Size: Adult   Pulse: 77   Resp: 16   Temp: 98 7 °F (37 1 °C)   TempSrc: Tympanic   SpO2: 94%   Weight: 61 kg (134 lb 6 4 oz)   Height: 5' 2" (1 575 m)        Physical Exam  Vitals and nursing note reviewed  Constitutional:       Appearance: She is well-developed  Cardiovascular:      Rate and Rhythm: Normal rate and regular rhythm  Heart sounds: Normal heart sounds  Pulmonary:      Effort: Pulmonary effort is normal       Breath sounds: Normal breath sounds  Abdominal:      Palpations: Abdomen is soft  Tenderness: There is no abdominal tenderness  Neurological:      Mental Status: She is alert and oriented to person, place, and time  Preop labs/testing available and reviewed: no               EKG no    Echo no    Stress test/cath no    PFT/Samuel no    Functional capacity: Walking , 4-5 MPH               4 Mets   Pick the highest level patient can comfortably perform   4 mets or greater for surgery    RCRI  High Risk surgery? 1 Point  CAD History:         1 Point   MI; Positive Stress Test; CP due to Mi;  Nitrate Usage to control Angina;  Pathologic Q wave on EKG  CHF Active:         1 Point   Pulm Edema; Paroxysmal Nocturnal Dyspnea;  Bibasilar Rales (crackles);S3; CHF on CXR  Cerebrovascular Disease (TIA or CVA):     1 Point  DM on Insulin:        1 Point  Serum Creat >2 0 mg/dl:       1 Point          Total Points: 0     Scorin: Class I, Very Low Risk (0 4%)     1: Class II, Low risk (0 9%)     2: Class III Moderate (6 6%)     3: Class IV High (>11%)      LEILA Risk:  GFR:        For PCP:  If GFR>60, Hold ACE/ARB/Diuretic on the day of surgery, and NSAIDS 10 days before  If GFR<45, Consider PRE and POST op Nephrology Consult  If 46 <GFR> 59 : Has Patient had LEILA in last 6 Months? no   If YES: Preop Nephrology consult   If No:  Cait Hoang Nephrology consult  Assessment/Plan:    Patient is medically optimized (cleared) for the planned procedure  Further testing/evaluation is not required  Labs ordered today are for her upcoming routine follow-up visit and are not preop labs  Postop concerns: no    Problem List Items Addressed This Visit        Endocrine    Hypothyroidism    Relevant Orders    CBC and differential    Comprehensive metabolic panel    T4, free    TSH, 3rd generation       Cardiovascular and Mediastinum    Hypertension, benign    Relevant Orders    Lipid panel       Other    Vitamin D deficiency    Relevant Orders    Vitamin D 25 hydroxy      Other Visit Diagnoses     Pre-op examination    -  Primary    Nuclear sclerotic cataract of both eyes               Diagnoses and all orders for this visit:    Pre-op examination    Nuclear sclerotic cataract of both eyes    Acquired hypothyroidism  -     CBC and differential; Future  -     Comprehensive metabolic panel; Future  -     T4, free; Future  -     TSH, 3rd generation; Future    Hypertension, benign  -     Lipid panel; Future    Vitamin D deficiency  -     Vitamin D 25 hydroxy;  Future        Pre-Surgery Instructions:   Medication Instructions    escitalopram (LEXAPRO) 10 mg tablet per anesthesia guidelines     fluticasone (FLONASE) 50 mcg/act nasal spray per anesthesia guidelines     levothyroxine 75 mcg tablet per anesthesia guidelines     lisinopril (ZESTRIL) 5 mg tablet per anesthesia guidelines     loratadine (CLARITIN) 10 mg tablet per anesthesia guidelines     Lumigan 0 01 % ophthalmic drops per anesthesia guidelines         NOTE: Please use the above to review important meds for your specialty, the remainder "per anesthesia Guidelines "    NOTE: Please place an Inbasket message for "[facility]" pool for complicated patients

## 2022-09-07 DIAGNOSIS — E03.9 HYPOTHYROIDISM, UNSPECIFIED TYPE: ICD-10-CM

## 2022-09-07 DIAGNOSIS — F32.1 CURRENT MODERATE EPISODE OF MAJOR DEPRESSIVE DISORDER WITHOUT PRIOR EPISODE (HCC): ICD-10-CM

## 2022-09-07 DIAGNOSIS — I10 ESSENTIAL HYPERTENSION: ICD-10-CM

## 2022-09-07 RX ORDER — LISINOPRIL 5 MG/1
TABLET ORAL
Qty: 90 TABLET | Refills: 3 | Status: SHIPPED | OUTPATIENT
Start: 2022-09-07

## 2022-09-07 RX ORDER — ESCITALOPRAM OXALATE 10 MG/1
TABLET ORAL
Qty: 90 TABLET | Refills: 3 | Status: SHIPPED | OUTPATIENT
Start: 2022-09-07

## 2022-09-07 RX ORDER — LEVOTHYROXINE SODIUM 0.07 MG/1
TABLET ORAL
Qty: 90 TABLET | Refills: 3 | Status: SHIPPED | OUTPATIENT
Start: 2022-09-07

## 2022-09-26 ENCOUNTER — APPOINTMENT (OUTPATIENT)
Dept: LAB | Facility: CLINIC | Age: 82
End: 2022-09-26
Payer: MEDICARE

## 2022-09-26 DIAGNOSIS — I10 HYPERTENSION, BENIGN: ICD-10-CM

## 2022-09-26 DIAGNOSIS — E03.9 ACQUIRED HYPOTHYROIDISM: ICD-10-CM

## 2022-09-26 DIAGNOSIS — E55.9 VITAMIN D DEFICIENCY: ICD-10-CM

## 2022-09-26 LAB
25(OH)D3 SERPL-MCNC: 46.3 NG/ML (ref 30–100)
ALBUMIN SERPL BCP-MCNC: 2.9 G/DL (ref 3.5–5)
ALP SERPL-CCNC: 75 U/L (ref 46–116)
ALT SERPL W P-5'-P-CCNC: 24 U/L (ref 12–78)
ANION GAP SERPL CALCULATED.3IONS-SCNC: 2 MMOL/L (ref 4–13)
AST SERPL W P-5'-P-CCNC: 19 U/L (ref 5–45)
BASOPHILS # BLD AUTO: 0.08 THOUSANDS/ΜL (ref 0–0.1)
BASOPHILS NFR BLD AUTO: 1 % (ref 0–1)
BILIRUB SERPL-MCNC: 0.54 MG/DL (ref 0.2–1)
BUN SERPL-MCNC: 16 MG/DL (ref 5–25)
CALCIUM ALBUM COR SERPL-MCNC: 10.4 MG/DL (ref 8.3–10.1)
CALCIUM SERPL-MCNC: 9.5 MG/DL (ref 8.3–10.1)
CHLORIDE SERPL-SCNC: 104 MMOL/L (ref 96–108)
CHOLEST SERPL-MCNC: 172 MG/DL
CO2 SERPL-SCNC: 30 MMOL/L (ref 21–32)
CREAT SERPL-MCNC: 0.83 MG/DL (ref 0.6–1.3)
EOSINOPHIL # BLD AUTO: 0.27 THOUSAND/ΜL (ref 0–0.61)
EOSINOPHIL NFR BLD AUTO: 4 % (ref 0–6)
ERYTHROCYTE [DISTWIDTH] IN BLOOD BY AUTOMATED COUNT: 13 % (ref 11.6–15.1)
GFR SERPL CREATININE-BSD FRML MDRD: 66 ML/MIN/1.73SQ M
GLUCOSE P FAST SERPL-MCNC: 87 MG/DL (ref 65–99)
HCT VFR BLD AUTO: 44.5 % (ref 34.8–46.1)
HDLC SERPL-MCNC: 52 MG/DL
HGB BLD-MCNC: 14.2 G/DL (ref 11.5–15.4)
IMM GRANULOCYTES # BLD AUTO: 0.03 THOUSAND/UL (ref 0–0.2)
IMM GRANULOCYTES NFR BLD AUTO: 0 % (ref 0–2)
LDLC SERPL CALC-MCNC: 105 MG/DL (ref 0–100)
LYMPHOCYTES # BLD AUTO: 2.75 THOUSANDS/ΜL (ref 0.6–4.47)
LYMPHOCYTES NFR BLD AUTO: 40 % (ref 14–44)
MCH RBC QN AUTO: 30.9 PG (ref 26.8–34.3)
MCHC RBC AUTO-ENTMCNC: 31.9 G/DL (ref 31.4–37.4)
MCV RBC AUTO: 97 FL (ref 82–98)
MONOCYTES # BLD AUTO: 0.8 THOUSAND/ΜL (ref 0.17–1.22)
MONOCYTES NFR BLD AUTO: 12 % (ref 4–12)
NEUTROPHILS # BLD AUTO: 2.92 THOUSANDS/ΜL (ref 1.85–7.62)
NEUTS SEG NFR BLD AUTO: 43 % (ref 43–75)
NONHDLC SERPL-MCNC: 120 MG/DL
NRBC BLD AUTO-RTO: 0 /100 WBCS
PLATELET # BLD AUTO: 249 THOUSANDS/UL (ref 149–390)
PMV BLD AUTO: 10 FL (ref 8.9–12.7)
POTASSIUM SERPL-SCNC: 4.6 MMOL/L (ref 3.5–5.3)
PROT SERPL-MCNC: 7.1 G/DL (ref 6.4–8.4)
RBC # BLD AUTO: 4.59 MILLION/UL (ref 3.81–5.12)
SODIUM SERPL-SCNC: 136 MMOL/L (ref 135–147)
T4 FREE SERPL-MCNC: 1.01 NG/DL (ref 0.76–1.46)
TRIGL SERPL-MCNC: 76 MG/DL
TSH SERPL DL<=0.05 MIU/L-ACNC: 0.81 UIU/ML (ref 0.45–4.5)
WBC # BLD AUTO: 6.85 THOUSAND/UL (ref 4.31–10.16)

## 2022-09-26 PROCEDURE — 84439 ASSAY OF FREE THYROXINE: CPT

## 2022-09-26 PROCEDURE — 82306 VITAMIN D 25 HYDROXY: CPT

## 2022-09-26 PROCEDURE — 80061 LIPID PANEL: CPT

## 2022-09-26 PROCEDURE — 36415 COLL VENOUS BLD VENIPUNCTURE: CPT

## 2022-09-26 PROCEDURE — 80053 COMPREHEN METABOLIC PANEL: CPT

## 2022-09-26 PROCEDURE — 84443 ASSAY THYROID STIM HORMONE: CPT

## 2022-09-26 PROCEDURE — 85025 COMPLETE CBC W/AUTO DIFF WBC: CPT

## 2023-03-28 ENCOUNTER — OFFICE VISIT (OUTPATIENT)
Dept: INTERNAL MEDICINE CLINIC | Facility: CLINIC | Age: 83
End: 2023-03-28

## 2023-03-28 VITALS
BODY MASS INDEX: 24.73 KG/M2 | OXYGEN SATURATION: 96 % | DIASTOLIC BLOOD PRESSURE: 80 MMHG | RESPIRATION RATE: 16 BRPM | HEART RATE: 85 BPM | HEIGHT: 62 IN | SYSTOLIC BLOOD PRESSURE: 130 MMHG | TEMPERATURE: 98.6 F | WEIGHT: 134.4 LBS

## 2023-03-28 DIAGNOSIS — R68.84 JAW PAIN: Primary | ICD-10-CM

## 2023-03-28 RX ORDER — METHYLPREDNISOLONE 4 MG/1
TABLET ORAL
Qty: 21 EACH | Refills: 0 | Status: SHIPPED | OUTPATIENT
Start: 2023-03-28

## 2023-03-28 NOTE — PROGRESS NOTES
Assessment/Plan:     Seems to be TMJ  She did not want to go for x-rays  We can try steroids and see if that improves her symptoms  Quality Measures:       Return if symptoms worsen or fail to improve  No problem-specific Assessment & Plan notes found for this encounter  Diagnoses and all orders for this visit:    Jaw pain  -     methylPREDNISolone 4 MG tablet therapy pack; Use as directed on package        Subjective:      Patient ID: Lexus Perez is a 82 y o  female  Patient comes in today complaining of a few weeks of pain in her jaw right at the TMJ joint  She states it starts worsening after she is chewing, even soft food  The pain starts in the TMJ on both sides and then will radiate upwards, sometimes giving her headache  She does not feel her sinuses are bothering her  She notes no swelling in her neck  No sore throat  Was just hoping it would go away        ALLERGIES:  Allergies   Allergen Reactions   • Sulfa Antibiotics Hives       CURRENT MEDICATIONS:    Current Outpatient Medications:   •  escitalopram (LEXAPRO) 10 mg tablet, TAKE 1 TABLET BY MOUTH  DAILY, Disp: 90 tablet, Rfl: 3  •  fluticasone (FLONASE) 50 mcg/act nasal spray, SHAKE LIQUID AND USE 1 SPRAY IN EACH NOSTRIL DAILY, Disp: 48 g, Rfl: 3  •  levothyroxine 75 mcg tablet, TAKE 1 TABLET BY MOUTH  DAILY, Disp: 90 tablet, Rfl: 3  •  lisinopril (ZESTRIL) 5 mg tablet, TAKE 1 TABLET BY MOUTH  DAILY, Disp: 90 tablet, Rfl: 3  •  loratadine (CLARITIN) 10 mg tablet, Take 10 mg by mouth daily, Disp: , Rfl:   •  Lumigan 0 01 % ophthalmic drops, , Disp: , Rfl:   •  methylPREDNISolone 4 MG tablet therapy pack, Use as directed on package, Disp: 21 each, Rfl: 0    ACTIVE PROBLEM LIST:  Patient Active Problem List   Diagnosis   • Allergic rhinitis   • Hyperlipidemia   • Hypertension, benign   • Hypothyroidism   • Vitamin D deficiency   • Depression   • Current moderate episode of major depressive disorder without prior episode (HCC)   • "Polyarthritis with positive rheumatoid factor ([facility] )       PAST MEDICAL HISTORY:  Past Medical History:   Diagnosis Date   • Depression 12/30/2021   • Lumbar stenosis    • Transient global amnesia        PAST SURGICAL HISTORY:  History reviewed  No pertinent surgical history  FAMILY HISTORY:  Family History   Problem Relation Age of Onset   • Stroke Mother 61        CVA   • Pancreatic cancer Father    • Hyperlipidemia Sister        SOCIAL HISTORY:  Social History     Socioeconomic History   • Marital status: /Civil Union     Spouse name: Not on file   • Number of children: 3   • Years of education: Not on file   • Highest education level: Not on file   Occupational History   • Occupation: [employer] office     Comment: Full time employment   Tobacco Use   • Smoking status: Former   • Smokeless tobacco: Never   Vaping Use   • Vaping Use: Never used   Substance and Sexual Activity   • Alcohol use: Yes     Comment: glass white wine with dinner   • Drug use: No   • Sexual activity: Not Currently     Partners: Male   Other Topics Concern   • Not on file   Social History Narrative   • Not on file     Social Determinants of Health     Financial Resource Strain: Not on file   Food Insecurity: Not on file   Transportation Needs: Not on file   Physical Activity: Not on file   Stress: Not on file   Social Connections: Not on file   Intimate Partner Violence: Not on file   Housing Stability: Not on file       Review of Systems   Constitutional: Negative for fever  HENT: Negative for sinus pressure and sinus pain  Objective:  Vitals:    03/28/23 1459   BP: 130/80   BP Location: Left arm   Patient Position: Sitting   Cuff Size: Adult   Pulse: 85   Resp: 16   Temp: 98 6 °F (37 °C)   TempSrc: Tympanic   SpO2: 96%   Weight: 61 kg (134 lb 6 4 oz)   Height: 5' 2\" (1 575 m)     Body mass index is 24 58 kg/m²  Physical Exam  Vitals and nursing note reviewed  Constitutional:       Appearance: Normal appearance   " She is not ill-appearing  HENT:      Head:      Jaw: Pain on movement (At TMJ region) present  No tenderness or swelling  Salivary Glands: Right salivary gland is not diffusely enlarged or tender  Left salivary gland is not diffusely enlarged or tender  Right Ear: Tympanic membrane, ear canal and external ear normal       Left Ear: Tympanic membrane, ear canal and external ear normal       Nose:      Right Sinus: No maxillary sinus tenderness or frontal sinus tenderness  Left Sinus: No maxillary sinus tenderness or frontal sinus tenderness  Mouth/Throat:      Pharynx: Oropharynx is clear  No oropharyngeal exudate or posterior oropharyngeal erythema  Neurological:      Mental Status: She is alert  RESULTS:    In chart    This note was created with voice recognition software  Phonic, grammatical and spelling errors may be present within the note as a result

## 2023-04-03 ENCOUNTER — APPOINTMENT (OUTPATIENT)
Dept: LAB | Facility: HOSPITAL | Age: 83
End: 2023-04-03
Attending: INTERNAL MEDICINE

## 2023-04-03 ENCOUNTER — HOSPITAL ENCOUNTER (OUTPATIENT)
Dept: RADIOLOGY | Facility: HOSPITAL | Age: 83
Discharge: HOME | End: 2023-04-03

## 2023-04-03 ENCOUNTER — TELEPHONE (OUTPATIENT)
Dept: INTERNAL MEDICINE CLINIC | Facility: CLINIC | Age: 83
End: 2023-04-03

## 2023-04-03 DIAGNOSIS — M05.80 POLYARTHRITIS WITH POSITIVE RHEUMATOID FACTOR (HCC): ICD-10-CM

## 2023-04-03 DIAGNOSIS — R68.84 JAW PAIN: Primary | ICD-10-CM

## 2023-04-03 DIAGNOSIS — R68.84 JAW PAIN: ICD-10-CM

## 2023-04-03 LAB
CRP SERPL QL: 6.1 MG/L
ERYTHROCYTE [SEDIMENTATION RATE] IN BLOOD: 23 MM/HOUR (ref 0–29)

## 2023-04-03 NOTE — TELEPHONE ENCOUNTER
"As per Dr Mccollum Minus, Nasreen Carlos, I put the orders in as discussed\"    Called patient and lmom advising of this    "

## 2023-04-03 NOTE — TELEPHONE ENCOUNTER
Patient said that she is ready to have the Xray done on her face  If you could please put in the order for that? Patient would also like to know if you could order the blood test that she had done last for the polymyalgia? Please advise    PLEASE call patient when orders have been placed

## 2023-04-04 ENCOUNTER — TELEPHONE (OUTPATIENT)
Dept: INTERNAL MEDICINE CLINIC | Facility: CLINIC | Age: 83
End: 2023-04-04

## 2023-04-04 NOTE — TELEPHONE ENCOUNTER
Patient has a heaviness on her right shoulder, like its rubbing  She is wondering if she is not having a flare up of polymyalgia? She would like a call when her labs are resulted and her xray results are done and resulted      Call back #[phone]

## 2023-04-05 ENCOUNTER — TELEPHONE (OUTPATIENT)
Dept: INTERNAL MEDICINE CLINIC | Facility: CLINIC | Age: 83
End: 2023-04-05

## 2023-04-05 DIAGNOSIS — G45.3 AMAUROSIS FUGAX OF LEFT EYE: Primary | ICD-10-CM

## 2023-04-05 NOTE — TELEPHONE ENCOUNTER
Called by patient's eye doctor, Dr Chris Dodge, who saw the patient because of an episode of loss of vision in the left eye  I believe he told me she complained of that and then after she laid down for a while, her vision returned  It does not sound like he saw anything on exam   Her sed rate and CRP were pretty much normal   He just requested vascular studies, carotid and echo  We will put those orders in and let the patient know

## 2023-04-06 NOTE — TELEPHONE ENCOUNTER
"As per Dr Annalee Brown, [facility] patient, I spoke with Dr Cedric Watters were padma Messina is still pending   We added 2 more tests because of the eye symptoms   Carotid ultrasound and echo   (Please give her the scheduling numbers)\"    Spoke with the patient and notified her of this  She verbalized understanding  I provided her with the central scheduling number and she will call and have these tests set up    "

## 2023-04-11 ENCOUNTER — APPOINTMENT (EMERGENCY)
Dept: CT IMAGING | Facility: HOSPITAL | Age: 83
End: 2023-04-11

## 2023-04-11 ENCOUNTER — HOSPITAL ENCOUNTER (INPATIENT)
Facility: HOSPITAL | Age: 83
LOS: 4 days | Discharge: HOME | End: 2023-04-15
Attending: EMERGENCY MEDICINE | Admitting: INTERNAL MEDICINE

## 2023-04-11 ENCOUNTER — APPOINTMENT (EMERGENCY)
Dept: RADIOLOGY | Facility: HOSPITAL | Age: 83
End: 2023-04-11

## 2023-04-11 DIAGNOSIS — G45.3 AMAUROSIS FUGAX OF LEFT EYE: ICD-10-CM

## 2023-04-11 DIAGNOSIS — H53.9 VISUAL DISTURBANCE: ICD-10-CM

## 2023-04-11 DIAGNOSIS — R68.84 JAW PAIN: Primary | ICD-10-CM

## 2023-04-11 DIAGNOSIS — M31.6 TEMPORAL ARTERITIS SYNDROME (HCC): ICD-10-CM

## 2023-04-11 DIAGNOSIS — M05.80 POLYARTHRITIS WITH POSITIVE RHEUMATOID FACTOR (HCC): ICD-10-CM

## 2023-04-11 LAB
2HR DELTA HS TROPONIN: 1 NG/L
ALBUMIN SERPL BCP-MCNC: 3.8 G/DL (ref 3.5–5)
ALP SERPL-CCNC: 88 U/L (ref 34–104)
ALT SERPL W P-5'-P-CCNC: 18 U/L (ref 7–52)
ANION GAP SERPL CALCULATED.3IONS-SCNC: 7 MMOL/L (ref 4–13)
APTT PPP: 28 SECONDS (ref 23–37)
AST SERPL W P-5'-P-CCNC: 18 U/L (ref 13–39)
BASOPHILS # BLD AUTO: 0.08 THOUSANDS/ΜL (ref 0–0.1)
BASOPHILS NFR BLD AUTO: 1 % (ref 0–1)
BILIRUB SERPL-MCNC: 0.43 MG/DL (ref 0.2–1)
BUN SERPL-MCNC: 11 MG/DL (ref 5–25)
CALCIUM SERPL-MCNC: 9.4 MG/DL (ref 8.4–10.2)
CARDIAC TROPONIN I PNL SERPL HS: 3 NG/L
CARDIAC TROPONIN I PNL SERPL HS: 4 NG/L
CHLORIDE SERPL-SCNC: 98 MMOL/L (ref 96–108)
CO2 SERPL-SCNC: 29 MMOL/L (ref 21–32)
CREAT SERPL-MCNC: 0.74 MG/DL (ref 0.6–1.3)
CRP SERPL QL: 39.2 MG/L
EOSINOPHIL # BLD AUTO: 0.15 THOUSAND/ΜL (ref 0–0.61)
EOSINOPHIL NFR BLD AUTO: 2 % (ref 0–6)
ERYTHROCYTE [DISTWIDTH] IN BLOOD BY AUTOMATED COUNT: 12.6 % (ref 11.6–15.1)
ERYTHROCYTE [SEDIMENTATION RATE] IN BLOOD: 60 MM/HOUR (ref 0–29)
GFR SERPL CREATININE-BSD FRML MDRD: 76 ML/MIN/1.73SQ M
GLUCOSE SERPL-MCNC: 93 MG/DL (ref 65–140)
HCT VFR BLD AUTO: 43.2 % (ref 34.8–46.1)
HGB BLD-MCNC: 13.9 G/DL (ref 11.5–15.4)
IMM GRANULOCYTES # BLD AUTO: 0.02 THOUSAND/UL (ref 0–0.2)
IMM GRANULOCYTES NFR BLD AUTO: 0 % (ref 0–2)
INR PPP: 1.02 (ref 0.84–1.19)
LYMPHOCYTES # BLD AUTO: 2.72 THOUSANDS/ΜL (ref 0.6–4.47)
LYMPHOCYTES NFR BLD AUTO: 28 % (ref 14–44)
MCH RBC QN AUTO: 29.6 PG (ref 26.8–34.3)
MCHC RBC AUTO-ENTMCNC: 32.2 G/DL (ref 31.4–37.4)
MCV RBC AUTO: 92 FL (ref 82–98)
MONOCYTES # BLD AUTO: 0.91 THOUSAND/ΜL (ref 0.17–1.22)
MONOCYTES NFR BLD AUTO: 9 % (ref 4–12)
NEUTROPHILS # BLD AUTO: 5.9 THOUSANDS/ΜL (ref 1.85–7.62)
NEUTS SEG NFR BLD AUTO: 60 % (ref 43–75)
NRBC BLD AUTO-RTO: 0 /100 WBCS
PLATELET # BLD AUTO: 227 THOUSANDS/UL (ref 149–390)
PMV BLD AUTO: 8.9 FL (ref 8.9–12.7)
POTASSIUM SERPL-SCNC: 3.8 MMOL/L (ref 3.5–5.3)
PROT SERPL-MCNC: 7.5 G/DL (ref 6.4–8.4)
PROTHROMBIN TIME: 13.2 SECONDS (ref 11.6–14.5)
RBC # BLD AUTO: 4.69 MILLION/UL (ref 3.81–5.12)
SODIUM SERPL-SCNC: 134 MMOL/L (ref 135–147)
WBC # BLD AUTO: 9.78 THOUSAND/UL (ref 4.31–10.16)

## 2023-04-11 RX ORDER — LISINOPRIL 5 MG/1
5 TABLET ORAL EVERY EVENING
Status: DISCONTINUED | OUTPATIENT
Start: 2023-04-11 | End: 2023-04-15 | Stop reason: HOSPADM

## 2023-04-11 RX ORDER — IBUPROFEN 400 MG/1
400 TABLET ORAL ONCE AS NEEDED
Status: DISCONTINUED | OUTPATIENT
Start: 2023-04-11 | End: 2023-04-11

## 2023-04-11 RX ORDER — HEPARIN SODIUM 5000 [USP'U]/ML
5000 INJECTION, SOLUTION INTRAVENOUS; SUBCUTANEOUS EVERY 8 HOURS SCHEDULED
Status: DISCONTINUED | OUTPATIENT
Start: 2023-04-11 | End: 2023-04-15 | Stop reason: HOSPADM

## 2023-04-11 RX ORDER — ESCITALOPRAM OXALATE 10 MG/1
10 TABLET ORAL DAILY
Status: DISCONTINUED | OUTPATIENT
Start: 2023-04-12 | End: 2023-04-11

## 2023-04-11 RX ORDER — LEVOTHYROXINE SODIUM 0.07 MG/1
75 TABLET ORAL DAILY
Status: DISCONTINUED | OUTPATIENT
Start: 2023-04-12 | End: 2023-04-15 | Stop reason: HOSPADM

## 2023-04-11 RX ORDER — ACETAMINOPHEN 325 MG/1
650 TABLET ORAL EVERY 6 HOURS PRN
Status: DISCONTINUED | OUTPATIENT
Start: 2023-04-11 | End: 2023-04-15 | Stop reason: HOSPADM

## 2023-04-11 RX ORDER — LISINOPRIL 5 MG/1
5 TABLET ORAL DAILY
Status: DISCONTINUED | OUTPATIENT
Start: 2023-04-12 | End: 2023-04-11

## 2023-04-11 RX ORDER — FLUTICASONE PROPIONATE 50 MCG
1 SPRAY, SUSPENSION (ML) NASAL DAILY
Status: DISCONTINUED | OUTPATIENT
Start: 2023-04-12 | End: 2023-04-15 | Stop reason: HOSPADM

## 2023-04-11 RX ORDER — ESCITALOPRAM OXALATE 10 MG/1
10 TABLET ORAL EVERY EVENING
Status: DISCONTINUED | OUTPATIENT
Start: 2023-04-11 | End: 2023-04-15 | Stop reason: HOSPADM

## 2023-04-11 RX ORDER — IBUPROFEN 400 MG/1
400 TABLET ORAL ONCE AS NEEDED
Status: ACTIVE | OUTPATIENT
Start: 2023-04-11 | End: 2023-04-12

## 2023-04-11 RX ORDER — LORATADINE 10 MG/1
10 TABLET ORAL DAILY
Status: DISCONTINUED | OUTPATIENT
Start: 2023-04-12 | End: 2023-04-15 | Stop reason: HOSPADM

## 2023-04-11 RX ADMIN — IOHEXOL 100 ML: 350 INJECTION, SOLUTION INTRAVENOUS at 17:55

## 2023-04-11 RX ADMIN — LISINOPRIL 5 MG: 5 TABLET ORAL at 22:32

## 2023-04-11 RX ADMIN — ESCITALOPRAM OXALATE 10 MG: 10 TABLET ORAL at 22:32

## 2023-04-11 RX ADMIN — HEPARIN SODIUM 5000 UNITS: 5000 INJECTION INTRAVENOUS; SUBCUTANEOUS at 22:32

## 2023-04-11 RX ADMIN — SODIUM CHLORIDE 1000 MG: 0.9 INJECTION, SOLUTION INTRAVENOUS at 22:30

## 2023-04-11 NOTE — ED PROVIDER NOTES
"History  Chief Complaint   Patient presents with   • Jaw Pain     Chewing causing jaw and temple pain x 3 wks, then started trouble w vision x 1 wk ago, saw [facility], and was told to go to pcp, vision is \"puzzle gray, worse with movement, intermittent\" denies pain in eyes      82year-old female presents with symptoms ongoing for the past 3 weeks  Patient complains of jaw pain when she chews, temporal pain bilaterally, then 1 week ago she started to have difficulty with her vision where after leaning over she describes puzzle pieces of grayness with hazy vision, that completely resolves inbetween episodes  She went to [facility] who advises that this is not due to a problem with her eyes  She has been seen by her primary care provider who ordered a CRP, sed rate, both of which were normal   Patient has a carotid duplex ordered for tomorrow as well as an EKG  No history of similar in the past   Currently feels tenderness to bilateral jaws and bilateral temples however no other complaints at this time  Prior to Admission Medications   Prescriptions Last Dose Informant Patient Reported? Taking? Lumigan 0 01 % ophthalmic drops   Yes No   escitalopram (LEXAPRO) 10 mg tablet   No No   Sig: TAKE 1 TABLET BY MOUTH  DAILY   fluticasone (FLONASE) 50 mcg/act nasal spray   No No   Sig: SHAKE LIQUID AND USE 1 SPRAY IN EACH NOSTRIL DAILY   levothyroxine 75 mcg tablet   No No   Sig: TAKE 1 TABLET BY MOUTH  DAILY   lisinopril (ZESTRIL) 5 mg tablet   No No   Sig: TAKE 1 TABLET BY MOUTH  DAILY   loratadine (CLARITIN) 10 mg tablet   Yes No   Sig: Take 10 mg by mouth daily   methylPREDNISolone 4 MG tablet therapy pack   No No   Sig: Use as directed on package      Facility-Administered Medications: None       Past Medical History:   Diagnosis Date   • Depression 12/30/2021   • Lumbar stenosis    • Transient global amnesia        History reviewed  No pertinent surgical history      Family History   Problem " Relation Age of Onset   • Stroke Mother 61        CVA   • Pancreatic cancer Father    • Hyperlipidemia Sister      I have reviewed and agree with the history as documented  E-Cigarette/Vaping   • E-Cigarette Use Never User      E-Cigarette/Vaping Substances   • Nicotine No    • THC No    • CBD No    • Flavoring No    • Other No    • Unknown No      Social History     Tobacco Use   • Smoking status: Former   • Smokeless tobacco: Never   Vaping Use   • Vaping Use: Never used   Substance Use Topics   • Alcohol use: Yes     Comment: glass white wine with dinner   • Drug use: No       Review of Systems   Constitutional: Negative for chills, fatigue and fever  HENT: Negative for congestion, sinus pressure and sinus pain  Jaw pain bilaterally   Eyes: Positive for visual disturbance  Negative for photophobia and pain  Respiratory: Negative for cough, chest tightness and shortness of breath  Cardiovascular: Negative for chest pain and palpitations  Gastrointestinal: Negative for abdominal pain, constipation, diarrhea, nausea and vomiting  Genitourinary: Negative for dysuria and flank pain  Musculoskeletal: Negative for back pain and neck pain  Skin: Negative for color change and rash  Allergic/Immunologic: Negative for immunocompromised state  Neurological: Negative for dizziness, syncope, weakness, light-headedness, numbness and headaches  Hematological: Does not bruise/bleed easily  Psychiatric/Behavioral: Negative for confusion  Physical Exam  Physical Exam  Vitals reviewed  Constitutional:       General: She is not in acute distress  Appearance: She is well-developed  She is not diaphoretic  HENT:      Head: Normocephalic and atraumatic  Mouth/Throat:      Mouth: Mucous membranes are moist    Eyes:      General: No scleral icterus  Right eye: No discharge  Left eye: No discharge  Extraocular Movements: Extraocular movements intact  Conjunctiva/sclera: Conjunctivae normal       Pupils: Pupils are equal, round, and reactive to light  Comments: No pain with extraocular movement  Temporal tenderness to palpation bilaterally with no external signs of abnormalities  No bruising   Neck:      Vascular: No JVD  Cardiovascular:      Rate and Rhythm: Normal rate and regular rhythm  Heart sounds: Normal heart sounds  No murmur heard  No friction rub  No gallop  Pulmonary:      Effort: Pulmonary effort is normal  No respiratory distress  Breath sounds: Normal breath sounds  No wheezing or rales  Chest:      Chest wall: No tenderness  Abdominal:      General: Bowel sounds are normal  There is no distension  Palpations: Abdomen is soft  Tenderness: There is no abdominal tenderness  There is no guarding or rebound  Musculoskeletal:         General: No tenderness or deformity  Normal range of motion  Cervical back: Normal range of motion and neck supple  Skin:     General: Skin is warm and dry  Coloration: Skin is not pale  Findings: No erythema or rash  Neurological:      Mental Status: She is alert and oriented to person, place, and time  Cranial Nerves: No cranial nerve deficit  Comments:  GCS 15  AAOx3  No focal neuro deficits  CN II-XII grossly intact  Speech normal, no aphasia or dysarthria  PERRL  EOMI  No photophobia  Peripheral vision intact  No nystagmus  Upper and lower extremity strength 5/5 through   strength 5/5 b/l  Gross sensation intact b/l       Psychiatric:         Behavior: Behavior normal          Vital Signs  ED Triage Vitals [04/11/23 1456]   Temperature Pulse Respirations Blood Pressure SpO2   (!) 97 1 °F (36 2 °C) 90 18 147/96 94 %      Temp src Heart Rate Source Patient Position - Orthostatic VS BP Location FiO2 (%)   -- -- -- -- --      Pain Score       --           Vitals:    04/11/23 1711 04/11/23 1731 04/11/23 1831 04/11/23 1930   BP: (!) 189/84 169/73 (!) 180/84 (!) 186/82   Pulse: 67 68 74 67         Visual Acuity  Visual Acuity    Flowsheet Row Most Recent Value   L Pupil Size (mm) 4   R Pupil Size (mm) 4          ED Medications  Medications   methylPREDNISolone sodium succinate (Solu-MEDROL) 1,000 mg in sodium chloride 0 9 % 250 mL IVPB (has no administration in time range)   methylPREDNISolone sodium succinate (Solu-MEDROL) 1,000 mg in sodium chloride 0 9 % 250 mL IVPB (has no administration in time range)   fluticasone (FLONASE) 50 mcg/act nasal spray 1 spray (has no administration in time range)   levothyroxine tablet 75 mcg (has no administration in time range)   loratadine (CLARITIN) tablet 10 mg (has no administration in time range)   acetaminophen (TYLENOL) tablet 650 mg (has no administration in time range)   heparin (porcine) subcutaneous injection 5,000 Units (has no administration in time range)   ibuprofen (MOTRIN) tablet 400 mg (has no administration in time range)   lisinopril (ZESTRIL) tablet 5 mg (has no administration in time range)   escitalopram (LEXAPRO) tablet 10 mg (has no administration in time range)   iohexol (OMNIPAQUE) 350 MG/ML injection (SINGLE-DOSE) 100 mL (100 mL Intravenous Given 4/11/23 1755)       Diagnostic Studies  Results Reviewed     Procedure Component Value Units Date/Time    HS Troponin I 2hr [[id number]]  (Normal) Collected: 04/11/23 1941    Lab Status: Final result Specimen: Blood from Line, Venous Updated: 04/11/23 2013     hs TnI 2hr 4 ng/L      Delta 2hr hsTnI 1 ng/L     HS Troponin 0hr (reflex protocol) [[id number]]  (Normal) Collected: 04/11/23 1715    Lab Status: Final result Specimen: Blood from Arm, Right Updated: 04/11/23 1759     hs TnI 0hr 3 ng/L     Comprehensive metabolic panel [[id number]]  (Abnormal) Collected: 04/11/23 1715    Lab Status: Final result Specimen: Blood from Arm, Right Updated: 04/11/23 1748     Sodium 134 mmol/L      Potassium 3 8 mmol/L      Chloride 98 mmol/L      CO2 29 mmol/L      ANION GAP 7 mmol/L      BUN 11 mg/dL      Creatinine 0 74 mg/dL      Glucose 93 mg/dL      Calcium 9 4 mg/dL      AST 18 U/L      ALT 18 U/L      Alkaline Phosphatase 88 U/L      Total Protein 7 5 g/dL      Albumin 3 8 g/dL      Total Bilirubin 0 43 mg/dL      eGFR 76 ml/min/1 73sq m     Narrative:      [place] guidelines for Chronic Kidney Disease (CKD):   •  Stage 1 with normal or high GFR (GFR > 90 mL/min/1 73 square meters)  •  Stage 2 Mild CKD (GFR = 60-89 mL/min/1 73 square meters)  •  Stage 3A Moderate CKD (GFR = 45-59 mL/min/1 73 square meters)  •  Stage 3B Moderate CKD (GFR = 30-44 mL/min/1 73 square meters)  •  Stage 4 Severe CKD (GFR = 15-29 mL/min/1 73 square meters)  •  Stage 5 End Stage CKD (GFR <15 mL/min/1 73 square meters)  Note: GFR calculation is accurate only with a steady state creatinine    C-reactive protein [[id number]]  (Abnormal) Collected: 04/11/23 1715    Lab Status: Final result Specimen: Blood from Arm, Right Updated: 04/11/23 1748     CRP 39 2 mg/L     Protime-INR [[id number]]  (Normal) Collected: 04/11/23 1715    Lab Status: Final result Specimen: Blood from Arm, Right Updated: 04/11/23 1745     Protime 13 2 seconds      INR 1 02    APTT [[id number]]  (Normal) Collected: 04/11/23 1715    Lab Status: Final result Specimen: Blood from Arm, Right Updated: 04/11/23 1745     PTT 28 seconds     Sedimentation rate, automated [[id number]]  (Abnormal) Collected: 04/11/23 1715    Lab Status: Final result Specimen: Blood from Arm, Right Updated: 04/11/23 1730     Sed Rate 60 mm/hour     CBC and differential [[id number]] Collected: 04/11/23 1715    Lab Status: Final result Specimen: Blood from Arm, Right Updated: 04/11/23 1723     WBC 9 78 Thousand/uL      RBC 4 69 Million/uL      Hemoglobin 13 9 g/dL      Hematocrit 43 2 %      MCV 92 fL      MCH 29 6 pg      MCHC 32 2 g/dL      RDW 12 6 %      MPV 8 9 fL      Platelets 398 Thousands/uL      nRBC 0 /100 WBCs      Neutrophils Relative 60 %      Immat GRANS % 0 %      Lymphocytes Relative 28 %      Monocytes Relative 9 %      Eosinophils Relative 2 %      Basophils Relative 1 %      Neutrophils Absolute 5 90 Thousands/µL      Immature Grans Absolute 0 02 Thousand/uL      Lymphocytes Absolute 2 72 Thousands/µL      Monocytes Absolute 0 91 Thousand/µL      Eosinophils Absolute 0 15 Thousand/µL      Basophils Absolute 0 08 Thousands/µL                  CTA head and neck with and without contrast   ED Interpretation by Tata Mott DO (04/11 1944)   No acute intracranial pathology  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  No significant stenosis of the cervical carotid or vertebral arteries  No significant intracranial stenosis, large vessel occlusion or aneurysm  Final Result by Mayelin Jones MD (04/11 1937)      No acute intracranial pathology  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  No significant stenosis of the cervical carotid or vertebral arteries  No significant intracranial stenosis, large vessel occlusion or aneurysm              Workstation performed: [id number]         XR chest 1 view portable    (Results Pending)              Procedures  ECG 12 Lead Documentation Only    Date/Time: 4/11/2023 5:19 PM  Performed by: Tata Mott DO  Authorized by: Tata Mott DO     Indications / Diagnosis:  Jaw pain  ECG reviewed by me, the ED Provider: yes    Patient location:  ED  Previous ECG:     Previous ECG:  Compared to current    Similarity:  No change    Comparison to cardiac monitor: Yes    Interpretation:     Interpretation: normal    Rate:     ECG rate assessment: normal    Rhythm:     Rhythm: sinus rhythm    Ectopy:     Ectopy: none    QRS:     QRS axis:  Normal    QRS intervals:  Normal  Conduction:     Conduction: normal    ST segments:     ST segments:  Normal  T waves:     T waves: normal               ED Course  ED Course as of 04/11/23 Irena 97 Apr 11, 2023   1823 C-REACTIVE PROTEIN(!): 39 2   1823 Sed Rate(!): 60                                             Medical Decision Making  82year-old with jaw pain, temporal pain, visual disturbance  We will do ACS work-up, CTA head and neck, ERS/CRP  CTA head and neck is nonconcerning  ACS work-up is normal   ESR/CRP are both markedly elevated and patient will be admitted for giant cell arteritis, temporal arteritis rule out / treatment  Amount and/or Complexity of Data Reviewed  Labs: ordered  Decision-making details documented in ED Course  Radiology: ordered  Risk  Prescription drug management  Decision regarding hospitalization  Disposition  Final diagnoses:   Jaw pain     Time reflects when diagnosis was documented in both MDM as applicable and the Disposition within this note     Time User Action Codes Description Comment    4/11/2023  8:09 PM Iris Paige Add [N38 81] Jaw pain     4/11/2023  8:25 PM Donal Henderson Add [H53 9] Visual disturbance       ED Disposition     ED Disposition   Admit    Condition   Stable    Date/Time   Tue Apr 11, 2023  8:09 PM    Comment   Case was discussed with [facility] and the patient's admission status was agreed to be Admission Status: inpatient status to the service of Dr Davila Section   Follow-up Information    None         Patient's Medications   Discharge Prescriptions    No medications on file       No discharge procedures on file      PDMP Review     None          ED Provider  Electronically Signed by           Michael Teague DO  04/11/23 1921

## 2023-04-12 ENCOUNTER — APPOINTMENT (OUTPATIENT)
Dept: NON INVASIVE DIAGNOSTICS | Facility: HOSPITAL | Age: 83
End: 2023-04-12
Attending: INTERNAL MEDICINE

## 2023-04-12 LAB
ANION GAP SERPL CALCULATED.3IONS-SCNC: 8 MMOL/L (ref 4–13)
ATRIAL RATE: 65 BPM
ATRIAL RATE: 67 BPM
BASOPHILS # BLD AUTO: 0.02 THOUSANDS/ΜL (ref 0–0.1)
BASOPHILS NFR BLD AUTO: 0 % (ref 0–1)
BUN SERPL-MCNC: 12 MG/DL (ref 5–25)
CALCIUM SERPL-MCNC: 9 MG/DL (ref 8.4–10.2)
CHLORIDE SERPL-SCNC: 100 MMOL/L (ref 96–108)
CO2 SERPL-SCNC: 27 MMOL/L (ref 21–32)
CREAT SERPL-MCNC: 0.69 MG/DL (ref 0.6–1.3)
EOSINOPHIL # BLD AUTO: 0 THOUSAND/ΜL (ref 0–0.61)
EOSINOPHIL NFR BLD AUTO: 0 % (ref 0–6)
ERYTHROCYTE [DISTWIDTH] IN BLOOD BY AUTOMATED COUNT: 12.4 % (ref 11.6–15.1)
GFR SERPL CREATININE-BSD FRML MDRD: 81 ML/MIN/1.73SQ M
GLUCOSE SERPL-MCNC: 157 MG/DL (ref 65–140)
HCT VFR BLD AUTO: 44.8 % (ref 34.8–46.1)
HGB BLD-MCNC: 14.9 G/DL (ref 11.5–15.4)
IMM GRANULOCYTES # BLD AUTO: 0.03 THOUSAND/UL (ref 0–0.2)
IMM GRANULOCYTES NFR BLD AUTO: 1 % (ref 0–2)
LYMPHOCYTES # BLD AUTO: 0.6 THOUSANDS/ΜL (ref 0.6–4.47)
LYMPHOCYTES NFR BLD AUTO: 10 % (ref 14–44)
MCH RBC QN AUTO: 30.3 PG (ref 26.8–34.3)
MCHC RBC AUTO-ENTMCNC: 33.3 G/DL (ref 31.4–37.4)
MCV RBC AUTO: 91 FL (ref 82–98)
MONOCYTES # BLD AUTO: 0.04 THOUSAND/ΜL (ref 0.17–1.22)
MONOCYTES NFR BLD AUTO: 1 % (ref 4–12)
NEUTROPHILS # BLD AUTO: 5.64 THOUSANDS/ΜL (ref 1.85–7.62)
NEUTS SEG NFR BLD AUTO: 88 % (ref 43–75)
NRBC BLD AUTO-RTO: 0 /100 WBCS
P AXIS: 73 DEGREES
P AXIS: 77 DEGREES
PLATELET # BLD AUTO: 193 THOUSANDS/UL (ref 149–390)
PMV BLD AUTO: 9.5 FL (ref 8.9–12.7)
POTASSIUM SERPL-SCNC: 4 MMOL/L (ref 3.5–5.3)
PR INTERVAL: 164 MS
PR INTERVAL: 164 MS
QRS AXIS: 48 DEGREES
QRS AXIS: 48 DEGREES
QRSD INTERVAL: 78 MS
QRSD INTERVAL: 80 MS
QT INTERVAL: 398 MS
QT INTERVAL: 410 MS
QTC INTERVAL: 413 MS
QTC INTERVAL: 433 MS
RBC # BLD AUTO: 4.91 MILLION/UL (ref 3.81–5.12)
SODIUM SERPL-SCNC: 135 MMOL/L (ref 135–147)
T WAVE AXIS: 68 DEGREES
T WAVE AXIS: 71 DEGREES
VENTRICULAR RATE: 65 BPM
VENTRICULAR RATE: 67 BPM
WBC # BLD AUTO: 6.33 THOUSAND/UL (ref 4.31–10.16)

## 2023-04-12 RX ORDER — HYDRALAZINE HYDROCHLORIDE 20 MG/ML
10 INJECTION INTRAMUSCULAR; INTRAVENOUS EVERY 6 HOURS PRN
Status: DISCONTINUED | OUTPATIENT
Start: 2023-04-12 | End: 2023-04-15 | Stop reason: HOSPADM

## 2023-04-12 RX ORDER — LABETALOL HYDROCHLORIDE 5 MG/ML
10 INJECTION, SOLUTION INTRAVENOUS EVERY 6 HOURS PRN
Status: DISCONTINUED | OUTPATIENT
Start: 2023-04-12 | End: 2023-04-15 | Stop reason: HOSPADM

## 2023-04-12 RX ADMIN — SODIUM CHLORIDE 1000 MG: 0.9 INJECTION, SOLUTION INTRAVENOUS at 18:53

## 2023-04-12 RX ADMIN — LEVOTHYROXINE SODIUM 75 MCG: 25 TABLET ORAL at 06:51

## 2023-04-12 RX ADMIN — LISINOPRIL 5 MG: 5 TABLET ORAL at 17:30

## 2023-04-12 RX ADMIN — HEPARIN SODIUM 5000 UNITS: 5000 INJECTION INTRAVENOUS; SUBCUTANEOUS at 21:22

## 2023-04-12 RX ADMIN — HEPARIN SODIUM 5000 UNITS: 5000 INJECTION INTRAVENOUS; SUBCUTANEOUS at 05:29

## 2023-04-12 RX ADMIN — ESCITALOPRAM OXALATE 10 MG: 10 TABLET ORAL at 17:30

## 2023-04-12 NOTE — PLAN OF CARE
Problem: Potential for Falls  Goal: Patient will remain free of falls  Description: INTERVENTIONS:  - Educate patient/family on patient safety including physical limitations  - Instruct patient to call for assistance with activity   - Consult OT/PT to assist with strengthening/mobility   - Keep Call bell within reach  - Keep bed low and locked with side rails adjusted as appropriate  - Keep care items and personal belongings within reach  - Initiate and maintain comfort rounds  - Make Fall Risk Sign visible to staff  - Offer Toileting every  2  Hours, in advance of need  - Initiate/Maintain  bed/chair alarm  - Obtain necessary fall risk management equipment:  daily  - Apply yellow socks and bracelet for high fall risk patients  - Consider moving patient to room near nurses station  Outcome: Progressing     Problem: SAFETY ADULT  Goal: Patient will remain free of falls  Description: INTERVENTIONS:  - Educate patient/family on patient safety including physical limitations  - Instruct patient to call for assistance with activity   - Consult OT/PT to assist with strengthening/mobility   - Keep Call bell within reach  - Keep bed low and locked with side rails adjusted as appropriate  - Keep care items and personal belongings within reach  - Initiate and maintain comfort rounds  - Make Fall Risk Sign visible to staff  - Offer Toileting every  2  Hours, in advance of need  - Initiate/Maintain  bed/chair alarm  - Obtain necessary fall risk management equipment:  daily  - Apply yellow socks and bracelet for high fall risk patients  - Consider moving patient to room near nurses station  Outcome: Progressing     Problem: Knowledge Deficit  Goal: Patient/family/caregiver demonstrates understanding of disease process, treatment plan, medications, and discharge instructions  Description: Complete learning assessment and assess knowledge base    Interventions:  - Provide teaching at level of understanding  - Provide teaching via preferred learning methods  Outcome: Progressing

## 2023-04-12 NOTE — ED NOTES
Patient asking if there is a possibility of her going home today instead of being admitted to the [facility] Text out to admitting doctor, awaiting response back        Katia Schaffer RN  04/12/23 1829

## 2023-04-12 NOTE — CONSULTS
"Consultation - Neurology   Lexus Perez 82 y o  female MRN: [id number]  Unit/Bed#: ED 09 Encounter: [id number]      Assessment/Plan   * Visual disturbance  Assessment & Plan  Lexus Perez is a 82 y o  female with HTN, HLD, polymyalgia rheumatica, positive rheumatoid factor, hypothyroidism who presents to [facility] ED on 4/11/2023 with pain with chewing x3 weeks with intermittent visual disturbances  Pain with chewing starting approximately 3 weeks ago, s/p Medrol Dosepak without improvement, with intermittent visual disturbances described as \"discs\" which resolved within a minute  Patient also describes an episode of visual loss in left eye upon waking, described as \" grayness\" in vision, which completely resolved after falling asleep and waking up again  Work-up:  - Outpatient XR of bilateral TMJs (4/3/2023) unremarkable  - CTA head and neck: Unremarkable for acute intracranial normalities, large vessel occlusion, critical stenosis    - Sed rate on presentation 60 (increased since 4/3, sed rate 23 at that time)  - CRP on presentation 39 2 (increased since 4/3, CRP 6 1 at that time)    With concern for giant cell arteritis given history of polymyalgia rheumatica, elevated inflammatory markers, and visual disturbances, patient started on Solu-Medrol  Temporal artery duplex pending followed by potential temporal artery biopsy      Plan:  - Temporal artery duplex pending  - If temporal artery US is unremarkable, would recommend temporal artery biopsy by 4/14/2023  - Echo pending   - Started on Solu-Medrol 1000 mg on 4/11, plan to continue for 3 days total (today 2/3)  · After 3 days, would start on Prednisone 60 mg daily on 4/14/2023  · Plan to continue for 2 weeks then reduce by 10 mg at 50 mg x 2 weeks, followed by 40 mg x 2 weeks, then tapering by 5 mg every 2 weeks until patient is at 10 mg which should be continued until followed by rheumatology  - Recommend discussion case with vascular surgery/general " "surgery as biopsy will likely need to be completed   - Follow-up with outpatient rheumatology    Recommendations for outpatient neurological follow up have yet to be determined  History of Present Illness     Reason for Consult / Principal Problem: Visual disturbance    HPI: Oneil Jeronimo is a 82 y o   female with HTN, HLD, polymyalgia rheumatica, positive rheumatoid factor, hypothyroidism who presents to [facility] ED on 4/11/2023 with pain with chewing x3 weeks with intermittent visual disturbances  Patient reports approximately 3 weeks ago she developed pain while chewing  She later saw her PCP 3/28/2023 for this pain who suspected TMJ and prescribed the prednisone 4 mg tablet therapy pack  Patient states that the steroids did not help with her pain  At some point after this visit patient woke up with visual loss in her left eye described as grayness in her vision  States she fell back asleep and upon waking up again, noticed that the visual deficit had resolved and her vision was back to baseline  Time after this episode within the last 3 weeks patient began to have changes in her vision described as \" puzzle pieces\" and \"discs\" floating in her vision  The visual deficits typically resolve within 1 minute  States she continued to have the pain with chewing which she reported was primarily in the temporal area resulting in a bitemporal headache with pain around the lateral aspects of her eyes  She admits to some photosensitivity with this headache  She also admits to very mild dysphagia  Denies associated weakness/numbness in extremities, slurred speech, word finding difficulties  Patient presented to [facility] ED on 4/11/2023  Initial BP on presentation 147/96, elevating to 189/84 within a couple of hours  CTA head and neck unremarkable  Patient found to have an elevated sed rate (60) and CRP (39 2) on presentation   Today patient states that she did not sleep well overnight and admits to a mild " headache currently, described above  Patient states the last time she had the visual changes was yesterday in the ED when she was talking to her   Currently denies chest pain, shortness of breath, abdominal pain, nausea, vomiting, dizziness, weakness, numbness/tingling  Inpatient consult to Neurology  Consult performed by: Larissa Raza PA-C  Consult ordered by: Rosalinda Dodge PA-C        Review of Systems  12 point ROS performed, as stated above, all others negative  Historical Information   Past Medical History:   Diagnosis Date   • Depression 12/30/2021   • Lumbar stenosis    • Transient global amnesia      History reviewed  No pertinent surgical history  Social History   Social History     Substance and Sexual Activity   Alcohol Use Yes    Comment: glass white wine with dinner     Social History     Substance and Sexual Activity   Drug Use No     E-Cigarette/Vaping   • E-Cigarette Use Never User      E-Cigarette/Vaping Substances   • Nicotine No    • THC No    • CBD No    • Flavoring No    • Other No    • Unknown No      Social History     Tobacco Use   Smoking Status Former   Smokeless Tobacco Never     Family History:   Family History   Problem Relation Age of Onset   • Stroke Mother 61        CVA   • Pancreatic cancer Father    • Hyperlipidemia Sister        Review of previous medical records was completed      Meds/Allergies   all current active meds have been reviewed, current meds:   Current Facility-Administered Medications   Medication Dose Route Frequency   • acetaminophen (TYLENOL) tablet 650 mg  650 mg Oral Q6H PRN   • escitalopram (LEXAPRO) tablet 10 mg  10 mg Oral QPM   • fluticasone (FLONASE) 50 mcg/act nasal spray 1 spray  1 spray Each Nare Daily   • heparin (porcine) subcutaneous injection 5,000 Units  5,000 Units Subcutaneous Q8H [address]   • hydrALAZINE (APRESOLINE) injection 10 mg  10 mg Intravenous Q6H PRN   • ibuprofen (MOTRIN) tablet 400 mg  400 mg Oral Once PRN   • labetalol (NORMODYNE) injection 10 mg  10 mg Intravenous Q6H PRN   • levothyroxine tablet 75 mcg  75 mcg Oral Daily   • lisinopril (ZESTRIL) tablet 5 mg  5 mg Oral QPM   • loratadine (CLARITIN) tablet 10 mg  10 mg Oral Daily   • methylPREDNISolone sodium succinate (Solu-MEDROL) 1,000 mg in sodium chloride 0 9 % 250 mL IVPB  1,000 mg Intravenous Daily   , PTA meds:   Prior to Admission Medications   Prescriptions Last Dose Informant Patient Reported? Taking? Lumigan 0 01 % ophthalmic drops   Yes No   escitalopram (LEXAPRO) 10 mg tablet   No No   Sig: TAKE 1 TABLET BY MOUTH  DAILY   fluticasone (FLONASE) 50 mcg/act nasal spray   No No   Sig: SHAKE LIQUID AND USE 1 SPRAY IN EACH NOSTRIL DAILY   levothyroxine 75 mcg tablet   No No   Sig: TAKE 1 TABLET BY MOUTH  DAILY   lisinopril (ZESTRIL) 5 mg tablet   No No   Sig: TAKE 1 TABLET BY MOUTH  DAILY   loratadine (CLARITIN) 10 mg tablet   Yes No   Sig: Take 10 mg by mouth daily   methylPREDNISolone 4 MG tablet therapy pack   No No   Sig: Use as directed on package      Facility-Administered Medications: None    and     Allergies   Allergen Reactions   • Sulfa Antibiotics Hives       Objective   Vitals:Blood pressure 157/65, pulse 84, temperature 98 °F (36 7 °C), temperature source Oral, resp  rate 16, weight 61 kg (134 lb 7 7 oz), SpO2 95 %  ,Body mass index is 24 6 kg/m²  Intake/Output Summary (Last 24 hours) at 4/12/2023 1545  Last data filed at 4/12/2023 0143  Gross per 24 hour   Intake 250 ml   Output --   Net 250 ml       Invasive Devices: Invasive Devices     Peripheral Intravenous Line  Duration           Peripheral IV 04/11/23 Right Antecubital <1 day                Physical Exam  Vitals and nursing note reviewed  Constitutional:       General: She is not in acute distress  Appearance: She is normal weight  She is not ill-appearing, toxic-appearing or diaphoretic  HENT:      Head: Normocephalic and atraumatic        Comments: Tenderness to palpation of bilateral temples  Eyes:      General:         Right eye: No discharge  Left eye: No discharge  Extraocular Movements: Extraocular movements intact and EOM normal       Conjunctiva/sclera: Conjunctivae normal       Pupils: Pupils are equal, round, and reactive to light  Musculoskeletal:         General: Normal range of motion  Cervical back: Normal range of motion and neck supple  Skin:     General: Skin is warm and dry  Coloration: Skin is not jaundiced or pale  Neurological:      Mental Status: She is alert  Motor: Motor strength is normal    Psychiatric:         Mood and Affect: Mood normal          Behavior: Behavior normal          Thought Content: Thought content normal          Judgment: Judgment normal        Neurologic Exam     Mental Status   Patient is alert, sitting up in chair  Oriented to person, place, month, and year   Able to name all objects provided   Follows central and appendicular commands   Answers all questions appropriately   No dysarthria or aphasia noted      Cranial Nerves     CN II   Visual fields full to confrontation  Right visual field deficit: none  Left visual field deficit: none     CN III, IV, VI   Pupils are equal, round, and reactive to light  Extraocular motions are normal    Nystagmus: none   Upgaze: normal  Downgaze: normal  Conjugate gaze: present    CN V   Facial sensation intact  CN VII   Facial expression full, symmetric  CN VIII   Hearing: intact    CN XI   CN XI normal      CN XII   Tongue deviation: none    Motor Exam   Muscle bulk: normal  Overall muscle tone: normal  Right arm pronator drift: absent  Left arm pronator drift: absent    Strength   Strength 5/5 throughout       Sensory Exam   Light touch normal    Temperature sensation intact throughout  No evidence of extinction with bilateral simultaneous stimulation     Gait, Coordination, and Reflexes     Tremor   Resting tremor: absent  No ataxia or dysmetria in bilateral upper extremity finger-nose testing    BUE reflexes 1+ throughout  Right patellar reflex 1+  Left patellar reflex absent  Difficulty eliciting bilateral Achilles reflex as patient had difficulty relaxing    Bilateral toes downgoing    No involuntary movements or rhythmic seizure-like activity noted throughout exam     Lab Results: I have personally reviewed pertinent reports    Recent Results (from the past 24 hour(s))   ECG 12 lead    Collection Time: 04/11/23  5:11 PM   Result Value Ref Range    Ventricular Rate 65 BPM    Atrial Rate 65 BPM    MD Interval 164 ms    QRSD Interval 80 ms    QT Interval 398 ms    QTC Interval 413 ms    P Axis 77 degrees    QRS Axis 48 degrees    T Wave Axis 68 degrees   ECG 12 lead    Collection Time: 04/11/23  5:11 PM   Result Value Ref Range    Ventricular Rate 67 BPM    Atrial Rate 67 BPM    MD Interval 164 ms    QRSD Interval 78 ms    QT Interval 410 ms    QTC Interval 433 ms    P Axis 73 degrees    QRS Axis 48 degrees    T Wave Axis 71 degrees   CBC and differential    Collection Time: 04/11/23  5:15 PM   Result Value Ref Range    WBC 9 78 4 31 - 10 16 Thousand/uL    RBC 4 69 3 81 - 5 12 Million/uL    Hemoglobin 13 9 11 5 - 15 4 g/dL    Hematocrit 43 2 34 8 - 46 1 %    MCV 92 82 - 98 fL    MCH 29 6 26 8 - 34 3 pg    MCHC 32 2 31 4 - 37 4 g/dL    RDW 12 6 11 6 - 15 1 %    MPV 8 9 8 9 - 12 7 fL    Platelets 629 180 - 811 Thousands/uL    nRBC 0 /100 WBCs    Neutrophils Relative 60 43 - 75 %    Immat GRANS % 0 0 - 2 %    Lymphocytes Relative 28 14 - 44 %    Monocytes Relative 9 4 - 12 %    Eosinophils Relative 2 0 - 6 %    Basophils Relative 1 0 - 1 %    Neutrophils Absolute 5 90 1 85 - 7 62 Thousands/µL    Immature Grans Absolute 0 02 0 00 - 0 20 Thousand/uL    Lymphocytes Absolute 2 72 0 60 - 4 47 Thousands/µL    Monocytes Absolute 0 91 0 17 - 1 22 Thousand/µL    Eosinophils Absolute 0 15 0 00 - 0 61 Thousand/µL    Basophils Absolute 0 08 0 00 - 0 10 Thousands/µL   Comprehensive metabolic "panel    Collection Time: 04/11/23  5:15 PM   Result Value Ref Range    Sodium 134 (L) 135 - 147 mmol/L    Potassium 3 8 3 5 - 5 3 mmol/L    Chloride 98 96 - 108 mmol/L    CO2 29 21 - 32 mmol/L    ANION GAP 7 4 - 13 mmol/L    BUN 11 5 - 25 mg/dL    Creatinine 0 74 0 60 - 1 30 mg/dL    Glucose 93 65 - 140 mg/dL    Calcium 9 4 8 4 - 10 2 mg/dL    AST 18 13 - 39 U/L    ALT 18 7 - 52 U/L    Alkaline Phosphatase 88 34 - 104 U/L    Total Protein 7 5 6 4 - 8 4 g/dL    Albumin 3 8 3 5 - 5 0 g/dL    Total Bilirubin 0 43 0 20 - 1 00 mg/dL    eGFR 76 ml/min/1 73sq m   HS Troponin 0hr (reflex protocol)    Collection Time: 04/11/23  5:15 PM   Result Value Ref Range    hs TnI 0hr 3 \"Refer to ACS Flowchart\"- see link ng/L   Protime-INR    Collection Time: 04/11/23  5:15 PM   Result Value Ref Range    Protime 13 2 11 6 - 14 5 seconds    INR 1 02 0 84 - 1 19   APTT    Collection Time: 04/11/23  5:15 PM   Result Value Ref Range    PTT 28 23 - 37 seconds   Sedimentation rate, automated    Collection Time: 04/11/23  5:15 PM   Result Value Ref Range    Sed Rate 60 (H) 0 - 29 mm/hour   C-reactive protein    Collection Time: 04/11/23  5:15 PM   Result Value Ref Range    CRP 39 2 (H) <3 0 mg/L   HS Troponin I 2hr    Collection Time: 04/11/23  7:41 PM   Result Value Ref Range    hs TnI 2hr 4 \"Refer to ACS Flowchart\"- see link ng/L    Delta 2hr hsTnI 1 <20 ng/L   Basic metabolic panel    Collection Time: 04/12/23  5:29 AM   Result Value Ref Range    Sodium 135 135 - 147 mmol/L    Potassium 4 0 3 5 - 5 3 mmol/L    Chloride 100 96 - 108 mmol/L    CO2 27 21 - 32 mmol/L    ANION GAP 8 4 - 13 mmol/L    BUN 12 5 - 25 mg/dL    Creatinine 0 69 0 60 - 1 30 mg/dL    Glucose 157 (H) 65 - 140 mg/dL    Calcium 9 0 8 4 - 10 2 mg/dL    eGFR 81 ml/min/1 73sq m   CBC and differential    Collection Time: 04/12/23  5:29 AM   Result Value Ref Range    WBC 6 33 4 31 - 10 16 Thousand/uL    RBC 4 91 3 81 - 5 12 Million/uL    Hemoglobin 14 9 11 5 - 15 4 g/dL    " Hematocrit 44 8 34 8 - 46 1 %    MCV 91 82 - 98 fL    MCH 30 3 26 8 - 34 3 pg    MCHC 33 3 31 4 - 37 4 g/dL    RDW 12 4 11 6 - 15 1 %    MPV 9 5 8 9 - 12 7 fL    Platelets 847 587 - 776 Thousands/uL    nRBC 0 /100 WBCs    Neutrophils Relative 88 (H) 43 - 75 %    Immat GRANS % 1 0 - 2 %    Lymphocytes Relative 10 (L) 14 - 44 %    Monocytes Relative 1 (L) 4 - 12 %    Eosinophils Relative 0 0 - 6 %    Basophils Relative 0 0 - 1 %    Neutrophils Absolute 5 64 1 85 - 7 62 Thousands/µL    Immature Grans Absolute 0 03 0 00 - 0 20 Thousand/uL    Lymphocytes Absolute 0 60 0 60 - 4 47 Thousands/µL    Monocytes Absolute 0 04 (L) 0 17 - 1 22 Thousand/µL    Eosinophils Absolute 0 00 0 00 - 0 61 Thousand/µL    Basophils Absolute 0 02 0 00 - 0 10 Thousands/µL   ]  Imaging Studies: I have personally reviewed pertinent reports and I have personally reviewed pertinent films in PACS  EKG, Pathology, and Other Studies: I have personally reviewed pertinent reports  VTE Prophylaxis: Heparin    Dictation voice to text software has been used in the creation of this document  Please consider this in light of any contextual or grammatical errors

## 2023-04-12 NOTE — ASSESSMENT & PLAN NOTE
"Christiana Mei is a 82 y o  female with HTN, HLD, polymyalgia rheumatica, positive rheumatoid factor, hypothyroidism who presents to [facility] ED on 4/11/2023 with pain with chewing x3 weeks with intermittent visual disturbances  Pain with chewing starting approximately 3 weeks ago, s/p Medrol Dosepak without improvement, with intermittent visual disturbances described as \"discs\" which resolved within a minute  Patient also describes an episode of visual loss in left eye upon waking, described as \" grayness\" in vision, which completely resolved after falling asleep and waking up again  Work-up:  - Outpatient XR of bilateral TMJs (4/3/2023) unremarkable  - CTA head and neck: Unremarkable for acute intracranial normalities, large vessel occlusion, critical stenosis    - Sed rate on presentation 60 (increased since 4/3, sed rate 23 at that time)  - CRP on presentation 39 2 (increased since 4/3, CRP 6 1 at that time)    With concern for giant cell arteritis given history of polymyalgia rheumatica, elevated inflammatory markers, and visual disturbances, patient started on Solu-Medrol  Temporal artery duplex pending followed by potential temporal artery biopsy      Plan:  - Temporal artery duplex pending  - If temporal artery US is unremarkable, would recommend temporal artery biopsy by 4/14/2023  - Echo pending   - Started on Solu-Medrol 1000 mg on 4/11, plan to continue for 3 days total (today 2/3)  · After 3 days, would start on Prednisone 60 mg daily on 4/14/2023  · Plan to continue for 2 weeks then reduce by 10 mg at 50 mg x 2 weeks, followed by 40 mg x 2 weeks, then tapering by 5 mg every 2 weeks until patient is at 10 mg which should be continued until followed by rheumatology  - Recommend discussion case with vascular surgery/general surgery as biopsy will likely need to be completed   - Follow-up with outpatient rheumatology  "

## 2023-04-12 NOTE — ASSESSMENT & PLAN NOTE
Patient reports worsening bilateral jaw and temple pain when chewing for the past 3 weeks  She has associated on/off bilateral visual disturbance associated with the symptoms  She was seen by [facility] eye as outpatient and told the issue was not due to her eyes  Seen by her PCP and had outpatient sed rate which was normal, and CRP which was elevated to 6 1  Denies other symptoms or complaint at this time      BP (!) 186/82   Pulse 67   Temp (!) 97 1 °F (36 2 °C)   Resp 21   Wt 61 kg (134 lb 7 7 oz)   SpO2 95%   BMI 24 60 kg/m²     · Reports bilateral jaw and temple pain when chewing for the past three weeks  · Has associated on/off visual disturbance associated with symptoms  · Hx of polymyalgia rheumatica  · On admission, right temporal artery was firm, non-tender  · Seen by [facility] and told the issue was not due to her eyes   · CTA head/neck negative for acute process    Lab Results   Component Value Date    ESR 60 (H) 04/11/2023    ESR 23 04/03/2023    CRP 39 2 (H) 04/11/2023    CRP 6 1 (H) 04/03/2023     · Patient being admitted for possible temporal arteritis workup (TMJ dysfunction as other possible explanation for pain?)  · Received 1g IV solumedrol in ED  · Will continue 1g IV solumedrol daily x2 more doses for now, pending further neuro review   · Neurology consulted; recommendations appreciated

## 2023-04-12 NOTE — ASSESSMENT & PLAN NOTE
Blood Pressure: 165/93    · Continue home lisinopril  · As needed antihypertensives as appropriate  · Monitor BP per unit protocol

## 2023-04-12 NOTE — PROGRESS NOTES
[address]  Progress Note  Name: Alexander Garsia  MRN: [id number]  Unit/Bed#: ED 09 I Date of Admission: 4/11/2023   Date of Service: 4/12/2023 I Hospital Day: 1    Assessment/Plan   * Visual disturbance  Assessment & Plan  Patient reports worsening bilateral jaw and temple pain when chewing for the past 3 weeks  She has associated on/off bilateral visual disturbance associated with the symptoms  She was seen by [facility] eye as outpatient and told the issue was not due to her eyes  Seen by her PCP and had outpatient sed rate which was normal, and CRP which was elevated to 6 1  Denies other symptoms or complaint at this time      BP (!) 186/82   Pulse 67   Temp (!) 97 1 °F (36 2 °C)   Resp 21   Wt 61 kg (134 lb 7 7 oz)   SpO2 95%   BMI 24 60 kg/m²     · Reports bilateral jaw and temple pain when chewing for the past three weeks  · Has associated on/off visual disturbance associated with symptoms  · Hx of polymyalgia rheumatica  · On admission, right temporal artery was firm, non-tender  · Seen by [facility] eye and told the issue was not due to her eyes   · CTA head/neck negative for acute process    Lab Results   Component Value Date    ESR 60 (H) 04/11/2023    ESR 23 04/03/2023    CRP 39 2 (H) 04/11/2023    CRP 6 1 (H) 04/03/2023     · Patient being admitted for possible temporal arteritis workup (TMJ dysfunction as other possible explanation for pain?)  · Received 1g IV solumedrol in ED  · Will continue 1g IV solumedrol daily x2 more doses for now, pending further neuro review   · Neurology consulted; recommendations appreciated    Polyarthritis with positive rheumatoid factor ([facility] [address] )  Assessment & Plan  · Controlled with prn ibuprofen at home   · If HTN difficult to control, will need to discontinue use of NSAIDs    Hypothyroidism  Assessment & Plan  Lab Results   Component Value Date    [id number] 0 811 09/26/2022    FREET4 1 01 09/26/2022       · Continue home levothyroxine    Hypertension, benign  Assessment & Plan  Blood Pressure: 165/93    · Continue home lisinopril  · As needed antihypertensives as appropriate  · Monitor BP per unit protocol    Hyperlipidemia  Assessment & Plan  Lab Results   Component Value Date    CHOLESTEROL 172 2022    TRIG 76 2022    HDL 52 2022    [facility] 105 (H) 2022       · Not currently on outpatient statin medication             VTE Pharmacologic Prophylaxis: VTE Score: 3 Moderate Risk (Score 3-4) - Pharmacological DVT Prophylaxis Ordered: heparin  Patient Centered Rounds: I performed bedside rounds with nursing staff today  Discussions with Specialists or Other Care Team Provider: Neurology    Education and Discussions with Family / Patient: Patient    Time Spent for Care: 75 minutes  More than 50% of total time spent on counseling and coordination of care as described above  Current Length of Stay: 1 day(s)  Current Patient Status: Inpatient   Certification Statement: The patient will continue to require additional inpatient hospital stay due to Work-up in management of suspicious arteritis temporal  Discharge Plan: Anticipate discharge in 24-48 hrs to home  Code Status: Level 1 - Full Code    Subjective:   Understanding of plan  Offers no new complaints  States the visual issues are improving after initiation of steroids  Objective:     Vitals:   Temp (24hrs), Av 6 °F (36 4 °C), Min:97 1 °F (36 2 °C), Max:98 °F (36 7 °C)    Temp:  [97 1 °F (36 2 °C)-98 °F (36 7 °C)] 98 °F (36 7 °C)  HR:  [61-90] 84  Resp:  [16-26] 16  BP: (139-189)/(62-96) 157/65  SpO2:  [91 %-98 %] 95 %  Body mass index is 24 6 kg/m²  Input and Output Summary (last 24 hours): Intake/Output Summary (Last 24 hours) at 2023 1327  Last data filed at 2023 0143  Gross per 24 hour   Intake 250 ml   Output --   Net 250 ml       Physical Exam:   Physical Exam  Vitals and nursing note reviewed     Constitutional: General: She is not in acute distress  Appearance: Normal appearance  HENT:      Head: Normocephalic and atraumatic  Right Ear: External ear normal       Left Ear: External ear normal       Nose: Nose normal       Mouth/Throat:      Mouth: Mucous membranes are moist    Eyes:      Pupils: Pupils are equal, round, and reactive to light  Cardiovascular:      Rate and Rhythm: Normal rate and regular rhythm  Pulses: Normal pulses  Heart sounds: Normal heart sounds  No murmur heard  Pulmonary:      Effort: Pulmonary effort is normal  No respiratory distress  Breath sounds: Normal breath sounds  No wheezing or rales  Chest:      Chest wall: No tenderness  Abdominal:      General: Bowel sounds are normal  There is no distension  Palpations: Abdomen is soft  There is no mass  Tenderness: There is no abdominal tenderness  There is no guarding  Musculoskeletal:         General: No swelling or tenderness  Cervical back: Normal range of motion and neck supple  No rigidity or tenderness  Right lower leg: No edema  Left lower leg: No edema  Skin:     General: Skin is warm and dry  Capillary Refill: Capillary refill takes less than 2 seconds  Findings: No lesion or rash  Neurological:      General: No focal deficit present  Mental Status: She is alert     Psychiatric:         Mood and Affect: Mood normal          Behavior: Behavior normal           Additional Data:     Labs:  Results from last 7 days   Lab Units 04/12/23  0529   WBC Thousand/uL 6 33   HEMOGLOBIN g/dL 14 9   HEMATOCRIT % 44 8   PLATELETS Thousands/uL 193   NEUTROS PCT % 88*   LYMPHS PCT % 10*   MONOS PCT % 1*   EOS PCT % 0     Results from last 7 days   Lab Units 04/12/23  0529 04/11/23  1715   SODIUM mmol/L 135 134*   POTASSIUM mmol/L 4 0 3 8   CHLORIDE mmol/L 100 98   CO2 mmol/L 27 29   BUN mg/dL 12 11   CREATININE mg/dL 0 69 0 74   ANION GAP mmol/L 8 7   CALCIUM mg/dL 9 0 9 4   ALBUMIN g/dL  --  3 8   TOTAL BILIRUBIN mg/dL  --  0 43   ALK PHOS U/L  --  88   ALT U/L  --  18   AST U/L  --  18   GLUCOSE RANDOM mg/dL 157* 93     Results from last 7 days   Lab Units 04/11/23  1715   INR  1 02                   Lines/Drains:  Invasive Devices     Peripheral Intravenous Line  Duration           Peripheral IV 04/11/23 Right Antecubital <1 day                      Imaging: Reviewed radiology reports from this admission including: CT head    CTA head and neck with and without contrast    Result Date: 4/11/2023  Impression: No acute intracranial pathology  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  No significant stenosis of the cervical carotid or vertebral arteries  No significant intracranial stenosis, large vessel occlusion or aneurysm  Workstation performed: [id number]       No Chest XR results available for this patient  Recent Cultures (last 7 days):         Last 24 Hours Medication List:   Current Facility-Administered Medications   Medication Dose Route Frequency Provider Last Rate   • acetaminophen  650 mg Oral Q6H PRN Hernesto Wilkinson PA-C     • escitalopram  10 mg Oral QPM Hernesto Wilkinson PA-C     • fluticasone  1 spray Each Nare Daily [place], [place]     • heparin (porcine)  5,000 Units Subcutaneous [place], [place]     • hydrALAZINE  10 mg Intravenous Q6H PRN Anders Paulino DO     • ibuprofen  400 mg Oral Once PRN Hernesto Wilkinson PA-C     • labetalol  10 mg Intravenous Q6H PRN Anders Paulino DO     • levothyroxine  75 mcg Oral Daily [facility] KRISTEN montalvo     • lisinop[facility]  [facility]PM [facility]     • loratadine  10 mg Oral Daily [place], [place]     • methylPREDNISolone sodium succinate  1,000 mg Intravenous Daily Jhoana Currie PA-C          Today, Patient Was Seen By: Brandin Wyatt DO    **Please Note: This note may have been constructed using a voice recognition system  **

## 2023-04-12 NOTE — ASSESSMENT & PLAN NOTE
Patient reports worsening bilateral jaw and temple pain when chewing for the past 3 weeks  She has associated on/off bilateral visual disturbance associated with the symptoms  She was seen by [facility] eye as outpatient and told the issue was not due to her eyes  Seen by her PCP and had outpatient sed rate which was normal, and CRP which was elevated to 6 1  Denies other symptoms or complaint at this time      BP (!) 186/82   Pulse 67   Temp (!) 97 1 °F (36 2 °C)   Resp 21   Wt 61 kg (134 lb 7 7 oz)   SpO2 95%   BMI 24 60 kg/m²     · Reports bilateral jaw and temple pain when chewing for the past three weeks  · Has associated on/off visual disturbance associated with symptoms  · Hx of polymyalgia rheumatica  · [facility] and told the issue was not due to her eyes   · CTA head/neck negative for acute process  · CRP 39 2; sed rate 60  · Right temporal artery feels firm, however non-tender  · Patient being admitted for possible temporal arteritis workup (TMJ dysfunction as other possible explanation for pain?)  · Received 1g IV solumedrol in ED  · Will continue 1g IV solumedrol daily x2 more doses for now, pending further neuro review   · Neurology consulted; recommendations appreciated

## 2023-04-12 NOTE — H&P
[address] [place] [address]  H&P  Name: Gita Mixon 82 y o  female I MRN: [id number]  Unit/Bed#: ED 09 I Date of Admission: 4/11/2023   Date of Service: 4/11/2023 I Hospital Day: 0      Assessment/Plan   * Visual disturbance  Assessment & Plan  Patient reports worsening bilateral jaw and temple pain when chewing for the past 3 weeks  She has associated on/off bilateral visual disturbance associated with the symptoms  She was seen by [facility] eye as outpatient and told the issue was not due to her eyes  Seen by her PCP and had outpatient sed rate which was normal, and CRP which was elevated to 6 1  Denies other symptoms or complaint at this time      BP (!) 186/82   Pulse 67   Temp (!) 97 1 °F (36 2 °C)   Resp 21   Wt 61 kg (134 lb 7 7 oz)   SpO2 95%   BMI 24 60 kg/m²     · Reports bilateral jaw and temple pain when chewing for the past three weeks  · Has associated on/off visual disturbance associated with symptoms  · Hx of polymyalgia rheumatica  · Seen by [facility] eye and told the issue was not due to her eyes   · CTA head/neck negative for acute process  · CRP 39 2; sed rate 60  · Right temporal artery feels firm, however non-tender  · Patient being admitted for possible temporal arteritis workup (TMJ dysfunction as other possible explanation for pain?)  · Received 1g IV solumedrol in ED  · Will continue 1g IV solumedrol daily x2 more doses for now, pending further neuro review   · Neurology consulted; recommendations appreciated     Polyarthritis with positive rheumatoid factor (HCC)  Assessment & Plan  · Controlled with prn ibuprofen at home     Hypothyroidism  Assessment & Plan  · Continue home levothyroxine    Hypertension, benign  Assessment & Plan  · /82  · Continue home lisinopril (due for dose now)  · As needed antihypertensives as appropriate  · Monitor BP per unit protocol    Hyperlipidemia  Assessment & Plan  · Not currently on outpatient statin medication    VTE Pharmacologic Prophylaxis: VTE Score: 3 Moderate Risk (Score 3-4) - Pharmacological DVT Prophylaxis Ordered: heparin  Code Status: Level 1 - Full Code   Discussion with family: update in AM      Anticipated Length of Stay: Patient will be admitted on an inpatient basis with an anticipated length of stay of greater than 2 midnights secondary to concern for temporal arteritis, requiring further workup  Total Time Spent on Date of Encounter in care of patient: 55 minutes This time was spent on one or more of the following: performing physical exam; counseling and coordination of care; obtaining or reviewing history; documenting in the medical record; reviewing/ordering tests, medications or procedures; communicating with other healthcare professionals and discussing with patient's family/caregivers  Chief Complaint: jaw pain, temple pain, and visual disturbance    History of Present Illness:  Titi Carroll is a 82 y o  female with a PMH of hypothyroidism, HTN, and HLD who presents with jaw pain, temple pain, and visual disturbance  Patient reports worsening bilateral jaw and temple pain when chewing for the past 3 weeks  She has associated on/off bilateral visual disturbance associated with the symptoms  She was seen by [facility] eye as outpatient and told the issue was not due to her eyes  Seen by her PCP and had outpatient sed rate which was normal, and CRP which was elevated to 6 1  Denies other symptoms or complaint at this time  All questions answered at the bedside to the best of my ability  Review of Systems:  Review of Systems   Constitutional: Negative for activity change, appetite change, chills, diaphoresis, fatigue and fever  +Temple pain   HENT: Negative for congestion, ear pain, nosebleeds and trouble swallowing  +Jaw pain when chewing   Eyes: Positive for visual disturbance  Negative for pain  Respiratory: Negative for apnea, cough, chest tightness, shortness of breath and wheezing  Cardiovascular: Negative for chest pain, palpitations and leg swelling  Gastrointestinal: Negative for abdominal distention, abdominal pain, blood in stool, constipation, diarrhea, nausea and vomiting  Endocrine: Negative for cold intolerance, heat intolerance and polyuria  Genitourinary: Negative for difficulty urinating, dysuria, flank pain and hematuria  Musculoskeletal: Negative for arthralgias, neck pain and neck stiffness  Skin: Negative for color change, rash and wound  Neurological: Negative for dizziness, tremors, syncope, weakness, light-headedness, numbness and headaches  Hematological: Negative for adenopathy  All other systems reviewed and are negative  Past Medical and Surgical History:   Past Medical History:   Diagnosis Date   • Depression 12/30/2021   • Lumbar stenosis    • Transient global amnesia        History reviewed  No pertinent surgical history  Meds/Allergies:  Prior to Admission medications    Medication Sig Start Date End Date Taking? Authorizing Provider   escitalopram (LEXAPRO) 10 mg tablet TAKE 1 TABLET BY MOUTH  DAILY 9/7/22   Mary Ann Mandel MD   fluticasone (FLONASE) 50 mcg/act nasal spray SHAKE LIQUID AND USE 1 SPRAY IN [facility] NOSTRIL DAILY 10/6/21   Tram Liang MD   levothyroxine 75 mcg tablet TAKE 1 TABLET BY MOUTH  DAILY 9/7/22   Mary Ann Mandel MD   lisinopril (ZESTRIL) 5 mg tablet TAKE 1 TABLET BY MOUTH  DAILY 9/7/22   Mary Ann Mandel MD   loratadine (CLARITIN) 10 mg tablet Take 10 mg by mouth daily    Historical Provider, MD Fuller 0 01 % ophthalmic drops  6/5/20   Historical Provider, MD   methylPREDNISolone 4 MG tablet therapy pack Use as directed on package 3/28/23   Tram Liang MD     I have reviewed home medications with patient personally  Allergies:    Allergies   Allergen Reactions   • Sulfa Antibiotics Hives       Social History:  Marital Status: /Civil Union   Occupation: N/A  Patient Pre-hospital Living Situation: Home  Patient Pre-hospital Level of Mobility: walks  Patient Pre-hospital Diet Restrictions: None  Substance Use History:   Social History     Substance and Sexual Activity   Alcohol Use Yes    Comment: glass white wine with dinner     Social History     Tobacco Use   Smoking Status Former   Smokeless Tobacco Never     Social History     Substance and Sexual Activity   Drug Use No       Family History:  Family History   Problem Relation Age of Onset   • Stroke Mother 61        CVA   • Pancreatic cancer Father    • Hyperlipidemia Sister        Physical Exam:     Vitals:   Blood Pressure: (!) 186/82 (04/11/23 1930)  Pulse: 67 (04/11/23 1930)  Temperature: (!) 97 1 °F (36 2 °C) (04/11/23 1456)  Respirations: 21 (04/11/23 1930)  Weight - Scale: 61 kg (134 lb 7 7 oz) (04/11/23 1456)  SpO2: 95 % (04/11/23 1930)    Physical Exam  Vitals and nursing note reviewed  Constitutional:       General: She is not in acute distress  Appearance: Normal appearance  HENT:      Head: Normocephalic and atraumatic  Right Ear: External ear normal       Left Ear: External ear normal       Nose: Nose normal       Mouth/Throat:      Mouth: Mucous membranes are moist    Eyes:      Pupils: Pupils are equal, round, and reactive to light  Cardiovascular:      Rate and Rhythm: Normal rate and regular rhythm  Pulses: Normal pulses  Heart sounds: Normal heart sounds  No murmur heard  Pulmonary:      Effort: Pulmonary effort is normal  No respiratory distress  Breath sounds: Normal breath sounds  No wheezing or rales  Chest:      Chest wall: No tenderness  Abdominal:      General: Bowel sounds are normal  There is no distension  Palpations: Abdomen is soft  There is no mass  Tenderness: There is no abdominal tenderness  There is no guarding  Musculoskeletal:         General: No swelling or tenderness  Cervical back: Normal range of motion and neck supple  No rigidity or tenderness        Right lower leg: No edema  Left lower leg: No edema  Skin:     General: Skin is warm and dry  Capillary Refill: Capillary refill takes less than 2 seconds  Findings: No lesion or rash  Neurological:      General: No focal deficit present  Mental Status: She is alert  Psychiatric:         Mood and Affect: Mood normal           Additional Data:     Lab Results:  Results from last 7 days   Lab Units 04/11/23  1715   WBC Thousand/uL 9 78   HEMOGLOBIN g/dL 13 9   HEMATOCRIT % 43 2   PLATELETS Thousands/uL 227   NEUTROS PCT % 60   LYMPHS PCT % 28   MONOS PCT % 9   EOS PCT % 2     Results from last 7 days   Lab Units 04/11/23  1715   SODIUM mmol/L 134*   POTASSIUM mmol/L 3 8   CHLORIDE mmol/L 98   CO2 mmol/L 29   BUN mg/dL 11   CREATININE mg/dL 0 74   ANION GAP mmol/L 7   CALCIUM mg/dL 9 4   ALBUMIN g/dL 3 8   TOTAL BILIRUBIN mg/dL 0 43   ALK PHOS U/L 88   ALT U/L 18   AST U/L 18   GLUCOSE RANDOM mg/dL 93     Results from last 7 days   Lab Units 04/11/23  1715   INR  1 02                   Lines/Drains:  Invasive Devices     Peripheral Intravenous Line  Duration           Peripheral IV 04/11/23 Right Antecubital <1 day                    Imaging: Reviewed radiology reports from this admission including: CTA head/neck  CTA head and neck with and without contrast   ED Interpretation by Rosalia Coe DO (04/11 1944)   No acute intracranial pathology  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  No significant stenosis of the cervical carotid or vertebral arteries  No significant intracranial stenosis, large vessel occlusion or aneurysm  Final Result by Zuleima Garcia MD (04/11 1937)      No acute intracranial pathology  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

## 2023-04-12 NOTE — ED NOTES
"Patient ambulating around room and navarrete, states \"I am stretching my legs\"   No acute distress noted at this time      Juana Persaud RN  04/12/23 8889    "

## 2023-04-12 NOTE — ASSESSMENT & PLAN NOTE
· /82  · Continue home lisinopril  · As needed antihypertensives as appropriate  · Monitor BP per unit protocol

## 2023-04-12 NOTE — ASSESSMENT & PLAN NOTE
Lab Results   Component Value Date    CHOLESTEROL 172 09/26/2022    TRIG 76 09/26/2022    HDL 52 09/26/2022    LDLCALC 105 (H) 09/26/2022       · Not currently on outpatient statin medication

## 2023-04-12 NOTE — ED NOTES
Pt ambulatory around the room, no needs expressed at this time  Given recliner for comfort and educated patient on use       Armida Cervantes RN  04/12/23 9457

## 2023-04-12 NOTE — ASSESSMENT & PLAN NOTE
Lab Results   Component Value Date    [id number] 0 811 09/26/2022    FREET4 1 01 09/26/2022       · Continue home levothyroxine

## 2023-04-12 NOTE — ED NOTES
"Patient states that when she turned on the light, she experienced pain behind her eyes  States that since then, the pain has gone away and she wanted to tell someone to make a note   States that it felt like \"old school cameras that have the big flash\"      Ezequiel Scales RN  04/12/23 1819    "

## 2023-04-12 NOTE — ED NOTES
This nurse resuming care of patient at this time, patient sitting on chair in room  No complaints at this time, VSS  Will continue to monitor patient        Ana Maria Soria RN  04/12/23 0302

## 2023-04-12 NOTE — ASSESSMENT & PLAN NOTE
· Controlled with prn ibuprofen at home   · If HTN difficult to control, will need to discontinue use of NSAIDs

## 2023-04-13 RX ADMIN — HEPARIN SODIUM 5000 UNITS: 5000 INJECTION INTRAVENOUS; SUBCUTANEOUS at 21:13

## 2023-04-13 RX ADMIN — LORATADINE 10 MG: 10 TABLET ORAL at 09:58

## 2023-04-13 RX ADMIN — HEPARIN SODIUM 5000 UNITS: 5000 INJECTION INTRAVENOUS; SUBCUTANEOUS at 14:22

## 2023-04-13 RX ADMIN — ESCITALOPRAM OXALATE 10 MG: 10 TABLET ORAL at 17:04

## 2023-04-13 RX ADMIN — HEPARIN SODIUM 5000 UNITS: 5000 INJECTION INTRAVENOUS; SUBCUTANEOUS at 06:09

## 2023-04-13 RX ADMIN — LISINOPRIL 5 MG: 5 TABLET ORAL at 17:04

## 2023-04-13 RX ADMIN — FLUTICASONE PROPIONATE 1 SPRAY: 50 SPRAY, METERED NASAL at 09:58

## 2023-04-13 RX ADMIN — SODIUM CHLORIDE 1000 MG: 0.9 INJECTION, SOLUTION INTRAVENOUS at 14:22

## 2023-04-13 RX ADMIN — LEVOTHYROXINE SODIUM 75 MCG: 25 TABLET ORAL at 06:09

## 2023-04-13 NOTE — PLAN OF CARE
Problem: Potential for Falls  Goal: Patient will remain free of falls  Description: INTERVENTIONS:  - Educate patient/family on patient safety including physical limitations  - Instruct patient to call for assistance with activity   - Consult OT/PT to assist with strengthening/mobility   - Keep Call bell within reach  - Keep bed low and locked with side rails adjusted as appropriate  - Keep care items and personal belongings within reach  - Initiate and maintain comfort rounds  - Make Fall Risk Sign visible to staff  - Offer Toileting every 2 Hours, in advance of need  - Initiate/Maintain bed alarm  - Obtain necessary fall risk management equipment: call bell within reeach  - Apply yellow socks and bracelet for high fall risk patients  - Consider moving patient to room near nurses station  Outcome: Progressing     Problem: SAFETY ADULT  Goal: Patient will remain free of falls  Description: INTERVENTIONS:  - Educate patient/family on patient safety including physical limitations  - Instruct patient to call for assistance with activity   - Consult OT/PT to assist with strengthening/mobility   - Keep Call bell within reach  - Keep bed low and locked with side rails adjusted as appropriate  - Keep care items and personal belongings within reach  - Initiate and maintain comfort rounds  - Make Fall Risk Sign visible to staff  - Offer Toileting every 2 Hours, in advance of need  - Initiate/Maintain bed alarm  - Obtain necessary fall risk management equipment: call bell within reach  - Apply yellow socks and bracelet for high fall risk patients  - Consider moving patient to room near nurses station  Outcome: Progressing  Goal: Maintain or return to baseline ADL function  Description: INTERVENTIONS:  -  Assess patient's ability to carry out ADLs; assess patient's baseline for ADL function and identify physical deficits which impact ability to perform ADLs (bathing, care of mouth/teeth, toileting, grooming, dressing, etc )  - Assess/evaluate cause of self-care deficits   - Assess range of motion  - Assess patient's mobility; develop plan if impaired  - Assess patient's need for assistive devices and provide as appropriate  - Encourage maximum independence but intervene and supervise when necessary  - Involve family in performance of ADLs  - Assess for home care needs following discharge   - Consider OT consult to assist with ADL evaluation and planning for discharge  - Provide patient education as appropriate  Outcome: Progressing  Goal: Maintains/Returns to pre admission functional level  Description: INTERVENTIONS:  - Perform BMAT or MOVE assessment daily    - Set and communicate daily mobility goal to care team and patient/family/caregiver  - Collaborate with rehabilitation services on mobility goals if consulted  - Perform Range of Motion 3 times a day  - Reposition patient every 2 hours  - Dangle patient 3 times a day  - Stand patient 3 times a day  - Ambulate patient 3 times a day  - Out of bed to chair 3 times a day   - Out of bed for meals 3 times a day  - Out of bed for toileting  - Record patient progress and toleration of activity level   Outcome: Progressing     Problem: Knowledge Deficit  Goal: Patient/family/caregiver demonstrates understanding of disease process, treatment plan, medications, and discharge instructions  Description: Complete learning assessment and assess knowledge base    Interventions:  - Provide teaching at level of understanding  - Provide teaching via preferred learning methods  Outcome: Progressing

## 2023-04-13 NOTE — ASSESSMENT & PLAN NOTE
"Patient reports worsening bilateral jaw and temple pain when chewing for the past 3 weeks  She has associated on/off bilateral visual disturbance associated with the symptoms  She was seen by [facility] eye as outpatient and told the issue was not due to her eyes  Seen by her PCP and had outpatient sed rate which was normal, and CRP which was elevated to 6 1  Denies other symptoms or complaint at this time      /82   Pulse 84   Temp 97 9 °F (36 6 °C)   Resp 19   Ht 5' 2\" (1 575 m)   Wt 61 kg (134 lb 7 7 oz)   SpO2 (!) 89%   BMI 24 60 kg/m²     · Reports bilateral jaw and temple pain when chewing for the past three weeks  · Has associated on/off visual disturbance associated with symptoms  · Hx of polymyalgia rheumatica  · On admission, right temporal artery was firm, non-tender  · Seen by [facility] and told the issue was not due to her eyes   · CTA head/neck negative for acute process    Lab Results   Component Value Date    ESR 60 (H) 04/11/2023    ESR 23 04/03/2023    CRP 39 2 (H) 04/11/2023    CRP 6 1 (H) 04/03/2023     · Patient undergoing temporal arteritis work-up  · Temporal artery Dopplers are not able to be performed at this campus and as a result we will be undergoing temporal artery biopsy  · General surgery has been consulted with the assistance of neurology-plan for procedure to be done tomorrow  · N p o  at midnight  · Continue IV Solu-Medrol  · Appreciate neurology input    " Patient tolerated his chemo without any adverse reactions  Port had excellent blood return before,during and after chemo  elastomeric pump connected per protocol with 2 RN double checks  Confirmed pump running with clamps open  Patient verbalized understanding of how it works  Patient forgot bag for pump so spare given  Patient stated he would return it when he comes for disconnect  Next appointment confirmed and avs given

## 2023-04-13 NOTE — PLAN OF CARE
Problem: Knowledge Deficit  Goal: Patient/family/caregiver demonstrates understanding of disease process, treatment plan, medications, and discharge instructions  Description: Complete learning assessment and assess knowledge base    Interventions:  - Provide teaching at level of understanding  - Provide teaching via preferred learning methods  4/13/2023 1945 by Cindy Tinsley RN  Outcome: Progressing  4/13/2023 1945 by Cindy Tinsley RN  Outcome: Progressing

## 2023-04-13 NOTE — PLAN OF CARE
Problem: Potential for Falls  Goal: Patient will remain free of falls  Description: INTERVENTIONS:  - Educate patient/family on patient safety including physical limitations  - Instruct patient to call for assistance with activity   - Consult OT/PT to assist with strengthening/mobility   - Keep Call bell within reach  - Keep bed low and locked with side rails adjusted as appropriate  - Keep care items and personal belongings within reach  - Initiate and maintain comfort rounds  - Make Fall Risk Sign visible to staff  - Offer Toileting every Hours, in advance of need  - Initiate/Maintain alarm  - Obtain necessary fall risk management equipment:   - Apply yellow socks and bracelet for high fall risk patients  - Consider moving patient to room near nurses station  Outcome: Progressing     Problem: SAFETY ADULT  Goal: Patient will remain free of falls  Description: INTERVENTIONS:  - Educate patient/family on patient safety including physical limitations  - Instruct patient to call for assistance with activity   - Consult OT/PT to assist with strengthening/mobility   - Keep Call bell within reach  - Keep bed low and locked with side rails adjusted as appropriate  - Keep care items and personal belongings within reach  - Initiate and maintain comfort rounds  - Make Fall Risk Sign visible to staff  - Offer Toileting every  Hours, in advance of need  - Initiate/Maintain alarm  - Obtain necessary fall risk management equipment:   - Apply yellow socks and bracelet for high fall risk patients  - Consider moving patient to room near nurses station  Outcome: Progressing  Goal: Maintain or return to baseline ADL function  Description: INTERVENTIONS:  -  Assess patient's ability to carry out ADLs; assess patient's baseline for ADL function and identify physical deficits which impact ability to perform ADLs (bathing, care of mouth/teeth, toileting, grooming, dressing, etc )  - Assess/evaluate cause of self-care deficits   - Assess range of motion  - Assess patient's mobility; develop plan if impaired  - Assess patient's need for assistive devices and provide as appropriate  - Encourage maximum independence but intervene and supervise when necessary  - Involve family in performance of ADLs  - Assess for home care needs following discharge   - Consider OT consult to assist with ADL evaluation and planning for discharge  - Provide patient education as appropriate  Outcome: Progressing  Goal: Maintains/Returns to pre admission functional level  Description: INTERVENTIONS:  - Perform BMAT or MOVE assessment daily    - Set and communicate daily mobility goal to care team and patient/family/caregiver  - Collaborate with rehabilitation services on mobility goals if consulted  - Perform Range of Motion times a day  - Reposition patient every  hours  - Dangle patient times a day  - Stand patient  times a day  - Ambulate patient times a day  - Out of bed to chair  times a day   - Out of bed for meals  times a day  - Out of bed for toileting  - Record patient progress and toleration of activity level   Outcome: Progressing     Problem: Knowledge Deficit  Goal: Patient/family/caregiver demonstrates understanding of disease process, treatment plan, medications, and discharge instructions  Description: Complete learning assessment and assess knowledge base    Interventions:  - Provide teaching at level of understanding  - Provide teaching via preferred learning methods  Outcome: Progressing

## 2023-04-13 NOTE — CONSULTS
"GENERAL SURGERY CONSULT                                                                                                                                               Devan Simental 82 y o  female MRN:                                                                     [id number]  Unit/Bed#: -01                                                         Encounter: [id number]                                                        Assessment/Plan   Visual Disturbance Left Eye x 1 week   Bilateral  Jaw/Temporal Pain associated with Chewing x 3 weeks  Elevated SED rate 60  Elevated CRP 39 2  4/3/23 value was 6 1,     H/o Polymyalgia Rheumatica, positive rheumatoid factor,  HTN  Hypothyroidism    -plan for OR Left Temporal Artery Biopsy tomorrow  -NPO midnight       CHIEF COMPLAINT:  I starting having gray spots in my left eye, first occurrence was 3 weeks ago  HPI: Devan Simental is a 82y o  year oldfemale,PMH: Polymyalgia Rheumatica, positive Rheumatoid factor, HTN, Hypothyroidism  consulted for temporal artery biopsy  Pt states she woke at 5 am 3 weeks ago, the vision in her left eye was gray  She went back to bed, when she got up it had resolved  She states this had occurred several more times with describing is at gray puzzle piece like spots/gray haziness  She also had these symptoms but to a lesser degree in her right eye  She also complains of 3 weeks of pain in bilateral jaws while chewing  The pain would then travel into the temporal areas  She was seen by her eye doctor at [facility] 4/5/23   who recommended to her PCP to obtain carotid duplex  CPR and SED rate were \"pretty much normal\"  ( he CRP was lower than her prior levels)   She did not have an ophthalmic source for vision changes  She continued to have visual changes in her left and then started having the same symptoms but to a lesser degree in her right eye     She was to have an [address] [place] [address] done as an outpatient but because she was " still having symptoms she reported to the ED  She was seen by Neurology in consult, recommendations for US of temporal arteries but this can only be done as outpatient  Therefore she will have biopsy of left temporal artery tomorrow      Imaging Studies: CTA head and neck with and without contrast    Result Date: 4/11/2023  Impression: No acute intracranial pathology  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  No significant stenosis of the cervical carotid or vertebral arteries  No significant intracranial stenosis, large vessel occlusion or aneurysm  Workstation performed: [id number]     XR chest 1 view portable    Result Date: 4/12/2023  Impression: No acute cardiopulmonary disease  Workstation performed: [id number]     Lab Results:   Lab Results   Component Value Date    WBC 6 33 04/12/2023    WBC 8 49 12/02/2015    HGB 14 9 04/12/2023    HGB 14 0 12/02/2015    HCT 44 8 04/12/2023    HCT 42 6 12/02/2015     04/12/2023     12/02/2015     Lab Results   Component Value Date     (L) 12/02/2015    K 4 0 04/12/2023    K 3 7 12/02/2015     04/12/2023     12/02/2015    CO2 27 04/12/2023    CO2 30 12/02/2015    BUN 12 04/12/2023    BUN 17 12/02/2015    CREATININE 0 69 04/12/2023    CREATININE 0 80 12/02/2015    GLUCOSE 82 12/02/2015       Inpatient consult to Acute Care Surgery  Consult performed by: Tracy Mejias PA-C  Consult ordered by: CHRIS Ventura          Historical Information   Past Medical History:   Diagnosis Date   • Depression 12/30/2021   • Lumbar stenosis    • Transient global amnesia      History reviewed  No pertinent surgical history    Social History   Social History     Substance and Sexual Activity   Alcohol Use Yes    Comment: glass white wine with dinner     Social History     Substance and Sexual Activity   Drug Use No     Social History     Tobacco Use   Smoking Status Former   Smokeless Tobacco Never     Family History: non-contributory    Allergies "  Allergen Reactions   • Sulfa Antibiotics Hives       Meds/Allergies   current meds:   Current Facility-Administered Medications   Medication Dose Route Frequency   • acetaminophen (TYLENOL) tablet 650 mg  650 mg Oral Q6H PRN   • escitalopram (LEXAPRO) tablet 10 mg  10 mg Oral QPM   • fluticasone (FLONASE) 50 mcg/act nasal spray 1 spray  1 spray Each Nare Daily   • heparin (porcine) subcutaneous injection 5,000 Units  5,000 Units Subcutaneous Q8H [address]   • hydrALAZINE (APRESOLINE) injection 10 mg  10 mg Intravenous Q6H PRN   • labetalol (NORMODYNE) injection 10 mg  10 mg Intravenous Q6H PRN   • levothyroxine tablet 75 mcg  75 mcg Oral Daily   • lisinopril (ZESTRIL) tablet 5 mg  5 mg Oral QPM   • loratadine (CLARITIN) tablet 10 mg  10 mg Oral Daily   • methylPREDNISolone sodium succinate (Solu-MEDROL) 1,000 mg in sodium chloride 0 9 % 250 mL IVPB  1,000 mg Intravenous Daily              Objective   Vitals:    Intake/Output Summary (Last 24 hours) at 4/13/2023 1317  Last data filed at 4/13/2023 1259  Gross per 24 hour   Intake 720 ml   Output 200 ml   Net 520 ml     Invasive Devices     Peripheral Intravenous Line  Duration           Peripheral IV 04/12/23 Left;Ventral (anterior) Forearm <1 day                Review of Systems  12 point ROS reviewed and Negative except[de-identified]   Per HPI    Physical Exam    Blood pressure 159/82, pulse 84, temperature 97 9 °F (36 6 °C), resp  rate 19, height 5' 2\" (1 575 m), weight 61 kg (134 lb 7 7 oz), SpO2 (!) 89 %  GEN: A & O x 3, cooperative   HEENT: PERRLA EOMI, sclera an icterus  No facial asymmetry  No TMJ dysfunction   NECK: supple   LUNGS: clear throughout  COR: apical rate is 100  no murmur  ABD: soft, NT   EXTREM: FROM no joint deformities    No pedal edema   SKIN: no rashes or lesions   NEURO: CN II -XII intact, no tremor, affect appropriate            Tina Hidalgo PA-C  4/13/2023  "

## 2023-04-13 NOTE — ASSESSMENT & PLAN NOTE
Blood Pressure: 160/82    · Continue home lisinopril  · As needed antihypertensives as appropriate  · Monitor BP per unit protocol

## 2023-04-13 NOTE — ASSESSMENT & PLAN NOTE
Lab Results   Component Value Date    [id number] 0 811 09/26/2022    FREET4 1 01 09/26/2022       · Continue home levothyroxine 6

## 2023-04-13 NOTE — PROGRESS NOTES
"[facility]  Progress Note  Name: Griselda Lopez  MRN: [id number]  Unit/Bed#: -01 I Date of Admission: 4/11/2023   Date of Service: 4/13/2023 I Hospital Day: 2    Assessment/Plan   * Visual disturbance  Assessment & Plan  Patient reports worsening bilateral jaw and temple pain when chewing for the past 3 weeks  She has associated on/off bilateral visual disturbance associated with the symptoms  She was seen by [facility] eye as outpatient and told the issue was not due to her eyes  Seen by her PCP and had outpatient sed rate which was normal, and CRP which was elevated to 6 1  Denies other symptoms or complaint at this time      /82   Pulse 84   Temp 97 9 °F (36 6 °C)   Resp 19   Ht 5' 2\" (1 575 m)   Wt 61 kg (134 lb 7 7 oz)   SpO2 (!) 89%   BMI 24 60 kg/m²     · Reports bilateral jaw and temple pain when chewing for the past three weeks  · Has associated on/off visual disturbance associated with symptoms  · Hx of polymyalgia rheumatica  · On admission, right temporal artery was firm, non-tender  · Seen by [facility] and told the issue was not due to her eyes   · CTA head/neck negative for acute process    Lab Results   Component Value Date    ESR 60 (H) 04/11/2023    ESR 23 04/03/2023    CRP 39 2 (H) 04/11/2023    CRP 6 1 (H) 04/03/2023     · Patient undergoing temporal arteritis work-up  · Temporal artery Dopplers are not able to be performed at this campus and as a result we will be undergoing temporal artery biopsy  · General surgery has been consulted with the assistance of neurology-plan for procedure to be done tomorrow  · N p o  at midnight  · Continue IV Solu-Medrol  · Appreciate neurology input      Polyarthritis with positive rheumatoid factor ([facility] [address] )  Assessment & Plan  · Controlled with prn ibuprofen at home   · If HTN difficult to control, will need to discontinue use of NSAIDs    Hypothyroidism  Assessment & Plan  Lab Results   Component Value Date    " [id number] 0 811 2022    FREET4 1 01 2022       · Continue home levothyroxine    Hypertension, benign  Assessment & Plan  Blood Pressure: 160/82    · Continue home lisinopril  · As needed antihypertensives as appropriate  · Monitor BP per unit protocol    Hyperlipidemia  Assessment & Plan  Lab Results   Component Value Date    CHOLESTEROL 172 2022    TRIG 76 2022    HDL 52 2022    [address] 105 (H) 2022       · Not currently on outpatient statin medication         VTE Pharmacologic Prophylaxis: VTE Score: 3 Moderate Risk (Score 3-4) - Pharmacological DVT Prophylaxis Ordered: heparin  Patient Centered Rounds: I performed bedside rounds with nursing staff today  Discussions with Specialists or Other Care Team Provider: General surgery, neurology    Education and Discussions with Family / Patient: Patient    Time Spent for Care: 75 minutes  More than 50% of total time spent on counseling and coordination of care as described above  Current Length of Stay: 2 day(s)  Current Patient Status: Inpatient   Certification Statement: The patient will continue to require additional inpatient hospital stay due to Temporal artery biopsy  Discharge Plan: Anticipate discharge in 24-48 hrs to home  Code Status: Level 1 - Full Code    Subjective:   Patient eager to be discharged home  However, understanding that she needs to undergo this work-up at this time  Objective:     Vitals:   Temp (24hrs), Av °F (36 7 °C), Min:97 8 °F (36 6 °C), Max:98 1 °F (36 7 °C)    Temp:  [97 8 °F (36 6 °C)-98 1 °F (36 7 °C)] 98 1 °F (36 7 °C)  HR:  [80-84] 80  Resp:  [19] 19  BP: (159-165)/(82-84) 160/82  SpO2:  [89 %-94 %] 92 %  Body mass index is 24 6 kg/m²  Input and Output Summary (last 24 hours):      Intake/Output Summary (Last 24 hours) at 2023  Last data filed at 2023 1741  Gross per 24 hour   Intake 720 ml   Output --   Net 720 ml       Physical Exam:   Physical Exam  Vitals and nursing note reviewed  Constitutional:       General: She is not in acute distress  Appearance: Normal appearance  HENT:      Head: Normocephalic and atraumatic  Right Ear: External ear normal       Left Ear: External ear normal       Nose: Nose normal       Mouth/Throat:      Mouth: Mucous membranes are moist    Eyes:      Pupils: Pupils are equal, round, and reactive to light  Cardiovascular:      Rate and Rhythm: Normal rate and regular rhythm  Pulses: Normal pulses  Heart sounds: Normal heart sounds  No murmur heard  Pulmonary:      Effort: Pulmonary effort is normal  No respiratory distress  Breath sounds: Normal breath sounds  No wheezing or rales  Chest:      Chest wall: No tenderness  Abdominal:      General: Bowel sounds are normal  There is no distension  Palpations: Abdomen is soft  There is no mass  Tenderness: There is no abdominal tenderness  There is no guarding  Musculoskeletal:         General: No swelling or tenderness  Cervical back: Normal range of motion and neck supple  No rigidity or tenderness  Right lower leg: No edema  Left lower leg: No edema  Skin:     General: Skin is warm and dry  Capillary Refill: Capillary refill takes less than 2 seconds  Findings: No lesion or rash  Neurological:      General: No focal deficit present  Mental Status: She is alert  Psychiatric:         Mood and Affect: Mood normal          Behavior: Behavior normal          Thought Content:  Thought content normal          Judgment: Judgment normal           Additional Data:     Labs:  Results from last 7 days   Lab Units 04/12/23  0529   WBC Thousand/uL 6 33   HEMOGLOBIN g/dL 14 9   HEMATOCRIT % 44 8   PLATELETS Thousands/uL 193   NEUTROS PCT % 88*   LYMPHS PCT % 10*   MONOS PCT % 1*   EOS PCT % 0     Results from last 7 days   Lab Units 04/12/23  0529 04/11/23  1715   SODIUM mmol/L 135 134*   POTASSIUM mmol/L 4 0 3 8   CHLORIDE mmol/L 100 98   CO2 mmol/L 27 29   BUN mg/dL 12 11   CREATININE mg/dL 0 69 0 74   ANION GAP mmol/L 8 7   CALCIUM mg/dL 9 0 9 4   ALBUMIN g/dL  --  3 8   TOTAL BILIRUBIN mg/dL  --  0 43   ALK PHOS U/L  --  88   ALT U/L  --  18   AST U/L  --  18   GLUCOSE RANDOM mg/dL 157* 93     Results from last 7 days   Lab Units 04/11/23  1715   INR  1 02                   Lines/Drains:  Invasive Devices     Peripheral Intravenous Line  Duration           Peripheral IV 04/12/23 Left;Ventral (anterior) Forearm <1 day                      Imaging: No pertinent imaging reviewed  CTA head and neck with and without contrast    Result Date: 4/11/2023  Impression: No acute intracranial pathology  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  No significant stenosis of the cervical carotid or vertebral arteries  No significant intracranial stenosis, large vessel occlusion or aneurysm  Workstation performed: [id number]     XR chest 1 view portable    Result Date: 4/12/2023  Impression: No acute cardiopulmonary disease  Workstation performed: [id number]       No Chest XR results available for this patient       Recent Cultures (last 7 days):         Last 24 Hours Medication List:   Current Facility-Administered Medications   Medication Dose Route Frequency Provider Last Rate   • acetaminophen  650 mg Oral Q6H PRN Corinne Collins, PA-C     • escitalopram  10 mg Oral QPM Corinne Collins, PA-C     • fluticasone  1 spray Each Nare Daily [place], [place]     • heparin (porcine)  5,000 Units Subcutaneous [place], [place]     • hydrALAZINE  10 mg Intravenous Q6H PRN Anders Paulino DO     • labetalol  10 mg Intravenous Q6H PRN Anders Paulino DO     • levothyroxine  75 mcg Oral Daily [place]     • lisinopril  5 mg Oral QPM Corinne Collins, PA-C     • loratadine  10 mg Oral Daily Corinne Collins, PA-C          Today, Patient Was Seen By: Anirudh Brady DO    **Please Note: This note may have been constructed using a voice recognition system  **

## 2023-04-14 LAB
ALBUMIN SERPL BCP-MCNC: 3.5 G/DL (ref 3.5–5)
ALP SERPL-CCNC: 68 U/L (ref 34–104)
ALT SERPL W P-5'-P-CCNC: 17 U/L (ref 7–52)
ANION GAP SERPL CALCULATED.3IONS-SCNC: 9 MMOL/L (ref 4–13)
AST SERPL W P-5'-P-CCNC: 15 U/L (ref 13–39)
BASOPHILS # BLD AUTO: 0.01 THOUSANDS/ΜL (ref 0–0.1)
BASOPHILS NFR BLD AUTO: 0 % (ref 0–1)
BILIRUB SERPL-MCNC: 0.24 MG/DL (ref 0.2–1)
BUN SERPL-MCNC: 23 MG/DL (ref 5–25)
CALCIUM SERPL-MCNC: 9 MG/DL (ref 8.4–10.2)
CHLORIDE SERPL-SCNC: 101 MMOL/L (ref 96–108)
CO2 SERPL-SCNC: 26 MMOL/L (ref 21–32)
CREAT SERPL-MCNC: 0.73 MG/DL (ref 0.6–1.3)
EOSINOPHIL # BLD AUTO: 0 THOUSAND/ΜL (ref 0–0.61)
EOSINOPHIL NFR BLD AUTO: 0 % (ref 0–6)
ERYTHROCYTE [DISTWIDTH] IN BLOOD BY AUTOMATED COUNT: 12.9 % (ref 11.6–15.1)
GFR SERPL CREATININE-BSD FRML MDRD: 77 ML/MIN/1.73SQ M
GLUCOSE SERPL-MCNC: 116 MG/DL (ref 65–140)
HCT VFR BLD AUTO: 39.5 % (ref 34.8–46.1)
HGB BLD-MCNC: 12.8 G/DL (ref 11.5–15.4)
IMM GRANULOCYTES # BLD AUTO: 0.07 THOUSAND/UL (ref 0–0.2)
IMM GRANULOCYTES NFR BLD AUTO: 1 % (ref 0–2)
LYMPHOCYTES # BLD AUTO: 0.94 THOUSANDS/ΜL (ref 0.6–4.47)
LYMPHOCYTES NFR BLD AUTO: 6 % (ref 14–44)
MCH RBC QN AUTO: 29.8 PG (ref 26.8–34.3)
MCHC RBC AUTO-ENTMCNC: 32.4 G/DL (ref 31.4–37.4)
MCV RBC AUTO: 92 FL (ref 82–98)
MONOCYTES # BLD AUTO: 0.37 THOUSAND/ΜL (ref 0.17–1.22)
MONOCYTES NFR BLD AUTO: 2 % (ref 4–12)
NEUTROPHILS # BLD AUTO: 13.72 THOUSANDS/ΜL (ref 1.85–7.62)
NEUTS SEG NFR BLD AUTO: 91 % (ref 43–75)
NRBC BLD AUTO-RTO: 0 /100 WBCS
PLATELET # BLD AUTO: 218 THOUSANDS/UL (ref 149–390)
PMV BLD AUTO: 9.3 FL (ref 8.9–12.7)
POTASSIUM SERPL-SCNC: 4.4 MMOL/L (ref 3.5–5.3)
PROT SERPL-MCNC: 6.8 G/DL (ref 6.4–8.4)
RBC # BLD AUTO: 4.3 MILLION/UL (ref 3.81–5.12)
SODIUM SERPL-SCNC: 136 MMOL/L (ref 135–147)
WBC # BLD AUTO: 15.11 THOUSAND/UL (ref 4.31–10.16)

## 2023-04-14 PROCEDURE — 03BT0ZX EXCISION OF LEFT TEMPORAL ARTERY, OPEN APPROACH, DIAGNOSTIC: ICD-10-PCS | Performed by: SURGERY

## 2023-04-14 RX ORDER — METHYLPREDNISOLONE SODIUM SUCCINATE 125 MG/2ML
48 INJECTION, POWDER, LYOPHILIZED, FOR SOLUTION INTRAMUSCULAR; INTRAVENOUS ONCE
Status: COMPLETED | OUTPATIENT
Start: 2023-04-14 | End: 2023-04-14

## 2023-04-14 RX ORDER — CEFAZOLIN SODIUM 1 G/50ML
1000 SOLUTION INTRAVENOUS ONCE
Status: COMPLETED | OUTPATIENT
Start: 2023-04-14 | End: 2023-04-14

## 2023-04-14 RX ORDER — FENTANYL CITRATE 50 UG/ML
25 INJECTION, SOLUTION INTRAMUSCULAR; INTRAVENOUS ONCE
Status: COMPLETED | OUTPATIENT
Start: 2023-04-14 | End: 2023-04-14

## 2023-04-14 RX ORDER — MAGNESIUM HYDROXIDE/ALUMINUM HYDROXICE/SIMETHICONE 120; 1200; 1200 MG/30ML; MG/30ML; MG/30ML
30 SUSPENSION ORAL EVERY 4 HOURS PRN
Status: DISCONTINUED | OUTPATIENT
Start: 2023-04-14 | End: 2023-04-15 | Stop reason: HOSPADM

## 2023-04-14 RX ORDER — LABETALOL HYDROCHLORIDE 5 MG/ML
20 INJECTION, SOLUTION INTRAVENOUS ONCE
Status: DISCONTINUED | OUTPATIENT
Start: 2023-04-14 | End: 2023-04-14 | Stop reason: HOSPADM

## 2023-04-14 RX ORDER — MAGNESIUM HYDROXIDE 1200 MG/15ML
LIQUID ORAL AS NEEDED
Status: DISCONTINUED | OUTPATIENT
Start: 2023-04-14 | End: 2023-04-14 | Stop reason: HOSPADM

## 2023-04-14 RX ORDER — LIDOCAINE HYDROCHLORIDE 10 MG/ML
INJECTION, SOLUTION EPIDURAL; INFILTRATION; INTRACAUDAL; PERINEURAL AS NEEDED
Status: DISCONTINUED | OUTPATIENT
Start: 2023-04-14 | End: 2023-04-14 | Stop reason: HOSPADM

## 2023-04-14 RX ORDER — LABETALOL HYDROCHLORIDE 5 MG/ML
10 INJECTION, SOLUTION INTRAVENOUS ONCE
Status: COMPLETED | OUTPATIENT
Start: 2023-04-14 | End: 2023-04-14

## 2023-04-14 RX ORDER — ONDANSETRON 2 MG/ML
4 INJECTION INTRAMUSCULAR; INTRAVENOUS ONCE AS NEEDED
Status: DISCONTINUED | OUTPATIENT
Start: 2023-04-14 | End: 2023-04-14 | Stop reason: HOSPADM

## 2023-04-14 RX ADMIN — HEPARIN SODIUM 5000 UNITS: 5000 INJECTION INTRAVENOUS; SUBCUTANEOUS at 16:48

## 2023-04-14 RX ADMIN — FENTANYL CITRATE 25 MCG: 50 INJECTION INTRAMUSCULAR; INTRAVENOUS at 12:03

## 2023-04-14 RX ADMIN — METHYLPREDNISOLONE SODIUM SUCCINATE 48 MG: 125 INJECTION, POWDER, FOR SOLUTION INTRAMUSCULAR; INTRAVENOUS at 18:06

## 2023-04-14 RX ADMIN — HEPARIN SODIUM 5000 UNITS: 5000 INJECTION INTRAVENOUS; SUBCUTANEOUS at 21:34

## 2023-04-14 RX ADMIN — ESCITALOPRAM OXALATE 10 MG: 10 TABLET ORAL at 18:06

## 2023-04-14 RX ADMIN — LEVOTHYROXINE SODIUM 75 MCG: 25 TABLET ORAL at 05:24

## 2023-04-14 RX ADMIN — LABETALOL HYDROCHLORIDE 10 MG: 5 INJECTION, SOLUTION INTRAVENOUS at 12:10

## 2023-04-14 RX ADMIN — ALUMINUM HYDROXIDE, MAGNESIUM HYDROXIDE, AND DIMETHICONE 30 ML: 200; 20; 200 SUSPENSION ORAL at 22:54

## 2023-04-14 RX ADMIN — LISINOPRIL 5 MG: 5 TABLET ORAL at 18:06

## 2023-04-14 NOTE — PROGRESS NOTES
"Progress Note - General Surgery   Guerrero Harrison 82 y o  female MRN: [id number]  Unit/Bed#: -01 Encounter: [id number]    Assessment:  Guerrero Harrison is a 82 y o  female POD1 s/p temporal artery biopsy  AVSS, no leukocytosis  Labs WNL    Plan:  • Biopsy site c/d/i without erythema, edema, exudate  Removed dressing  Leave steristrips in place  No dressing needed  Dc instructions provided  Okay for dc from surgical standpoint  • Remainder of care per primary team - SLIM    Subjective/Objective    Subjective: No acute events overnight  Objective:     Blood pressure 166/92, pulse 75, temperature 97 6 °F (36 4 °C), resp  rate 12, height 5' 2\" (1 575 m), weight 61 kg (134 lb 7 7 oz), SpO2 94 %  ,Body mass index is 24 6 kg/m²  Intake/Output Summary (Last 24 hours) at 4/14/2023 1628  Last data filed at 4/14/2023 1148  Gross per 24 hour   Intake 950 ml   Output 400 ml   Net 550 ml       Invasive Devices     Peripheral Intravenous Line  Duration           Peripheral IV 04/12/23 Left;Ventral (anterior) Forearm 1 day                Physical Exam:   GEN: NAD  HEENT: NCAT, MMM  Biopsy site c/d/i without erythema, edema, exudate  CV: RRR, no m/r/g  Lung: Normal effort, CTA B/L, no w/r/r  Ab: Soft, NT/ND  Extrem: No CCE   Neuro: A+Ox3     Lab, Imaging and other studies:I have personally reviewed pertinent lab results       VTE Pharmacologic Prophylaxis: Heparin  VTE Mechanical Prophylaxis: sequential compression device    Recent Results (from the past 36 hour(s))   CBC and differential    Collection Time: 04/14/23  4:58 AM   Result Value Ref Range    WBC 15 11 (H) 4 31 - 10 16 Thousand/uL    RBC 4 30 3 81 - 5 12 Million/uL    Hemoglobin 12 8 11 5 - 15 4 g/dL    Hematocrit 39 5 34 8 - 46 1 %    MCV 92 82 - 98 fL    MCH 29 8 26 8 - 34 3 pg    MCHC 32 4 31 4 - 37 4 g/dL    RDW 12 9 11 6 - 15 1 %    MPV 9 3 8 9 - 12 7 fL    Platelets 697 433 - 540 Thousands/uL    nRBC 0 /100 WBCs    Neutrophils Relative 91 (H) 43 - " 75 %    Immat GRANS % 1 0 - 2 %    Lymphocytes Relative 6 (L) 14 - 44 %    Monocytes Relative 2 (L) 4 - 12 %    Eosinophils Relative 0 0 - 6 %    Basophils Relative 0 0 - 1 %    Neutrophils Absolute 13 72 (H) 1 85 - 7 62 Thousands/µL    Immature Grans Absolute 0 07 0 00 - 0 20 Thousand/uL    Lymphocytes Absolute 0 94 0 60 - 4 47 Thousands/µL    Monocytes Absolute 0 37 0 17 - 1 22 Thousand/µL    Eosinophils Absolute 0 00 0 00 - 0 61 Thousand/µL    Basophils Absolute 0 01 0 00 - 0 10 Thousands/µL   Comprehensive metabolic panel    Collection Time: 04/14/23  4:58 AM   Result Value Ref Range    Sodium 136 135 - 147 mmol/L    Potassium 4 4 3 5 - 5 3 mmol/L    Chloride 101 96 - 108 mmol/L    CO2 26 21 - 32 mmol/L    ANION GAP 9 4 - 13 mmol/L    BUN 23 5 - 25 mg/dL    Creatinine 0 73 0 60 - 1 30 mg/dL    Glucose 116 65 - 140 mg/dL    Calcium 9 0 8 4 - 10 2 mg/dL    AST 15 13 - 39 U/L    ALT 17 7 - 52 U/L    Alkaline Phosphatase 68 34 - 104 U/L    Total Protein 6 8 6 4 - 8 4 g/dL    Albumin 3 5 3 5 - 5 0 g/dL    Total Bilirubin 0 24 0 20 - 1 00 mg/dL    eGFR 77 ml/min/1 73sq m

## 2023-04-14 NOTE — PROGRESS NOTES
"[address] [facility]  Progress Note  Name: Gerri Stern  MRN: [id number]  Unit/Bed#: -01 I Date of Admission: 4/11/2023   Date of Service: 4/14/2023 I Hospital Day: 3    Assessment/Plan   * Visual disturbance  Assessment & Plan  Patient reports worsening bilateral jaw and temple pain when chewing for the past 3 weeks  She has associated on/off bilateral visual disturbance associated with the symptoms  She was seen by [facility] eye as outpatient and told the issue was not due to her eyes  Seen by her PCP and had outpatient sed rate which was normal, and CRP which was elevated to 6 1  Denies other symptoms or complaint at this time      /86   Pulse 72   Temp 97 7 °F (36 5 °C)   Resp 17   Ht 5' 2\" (1 575 m)   Wt 61 kg (134 lb 7 7 oz)   SpO2 94%   BMI 24 60 kg/m²     Bilateral jaw and temple pain when chewing for the past three weeks  · Has associated on/off visual disturbance associated with symptoms  · Hx of polymyalgia rheumatica  · On admission, right temporal artery was firm, non-tender  · Seen by [facility] and told the issue was not due to her eyes   · CTA head/neck negative for acute process    Lab Results   Component Value Date    ESR 60 (H) 04/11/2023    ESR 23 04/03/2023    CRP 39 2 (H) 04/11/2023    CRP 6 1 (H) 04/03/2023     · Patient undergoing temporal arteritis work-up  · Temporal artery Dopplers are not able to be performed at this campus and as a result we will be undergoing temporal artery biopsy  · General surgery has been consulted with the assistance of neurology-plan for procedure today  · N p o  until procedure today  · Continue IV Solu-Medrol  · Appreciate neurology/surgery input      Polyarthritis with positive rheumatoid factor ([address] )  Assessment & Plan  · Controlled with prn ibuprofen at home   · If HTN difficult to control, will need to discontinue use of NSAIDs    Hypothyroidism  Assessment & Plan  Lab Results   Component Value Date    [id number] 0 811 " 2022    FREET4 1 01 2022       · Continue home levothyroxine    Hypertension, benign  Assessment & Plan  Blood Pressure: 166/86    · Continue home lisinopril  · As needed antihypertensives as appropriate  · Monitor BP per unit protocol    Hyperlipidemia  Assessment & Plan  Lab Results   Component Value Date    CHOLESTEROL 172 2022    TRIG 76 2022    HDL 52 2022    [address] 105 (H) 2022       · Not currently on outpatient statin medication             VTE Pharmacologic Prophylaxis: VTE Score: 3 Moderate Risk (Score 3-4) - Pharmacological DVT Prophylaxis Ordered: heparin  Patient Centered Rounds: I performed bedside rounds with nursing staff today  Discussions with Specialists or Other Care Team Provider: jovani, neuro    Education and Discussions with Family / Patient: patient    Time Spent for Care: 75 minutes  More than 50% of total time spent on counseling and coordination of care as described above  Current Length of Stay: 3 day(s)  Current Patient Status: Inpatient   Certification Statement: The patient will continue to require additional inpatient hospital stay due to surgical procedure today for b  Discharge Plan: Anticipate discharge in 24-48 hrs to home  Code Status: Level 1 - Full Code    Subjective:   Patient anxious about procedure but understanding of plan  All questions answered  Objective:     Vitals:   Temp (24hrs), Av 9 °F (36 6 °C), Min:97 7 °F (36 5 °C), Max:98 1 °F (36 7 °C)    Temp:  [97 7 °F (36 5 °C)-98 1 °F (36 7 °C)] 97 7 °F (36 5 °C)  HR:  [72-82] 72  Resp:  [17-19] 17  BP: (160-167)/(82-89) 166/86  SpO2:  [92 %-94 %] 94 %  Body mass index is 24 6 kg/m²  Input and Output Summary (last 24 hours): Intake/Output Summary (Last 24 hours) at 2023 0949  Last data filed at 2023 0454  Gross per 24 hour   Intake 840 ml   Output 400 ml   Net 440 ml       Physical Exam:   Physical Exam  Vitals and nursing note reviewed  Constitutional:       General: She is not in acute distress  Appearance: Normal appearance  HENT:      Head: Normocephalic and atraumatic  Right Ear: External ear normal       Left Ear: External ear normal       Nose: Nose normal       Mouth/Throat:      Mouth: Mucous membranes are moist    Eyes:      Pupils: Pupils are equal, round, and reactive to light  Cardiovascular:      Rate and Rhythm: Normal rate and regular rhythm  Pulses: Normal pulses  Heart sounds: Normal heart sounds  No murmur heard  Pulmonary:      Effort: Pulmonary effort is normal  No respiratory distress  Breath sounds: Normal breath sounds  No wheezing or rales  Chest:      Chest wall: No tenderness  Abdominal:      General: Bowel sounds are normal  There is no distension  Palpations: Abdomen is soft  There is no mass  Tenderness: There is no abdominal tenderness  There is no guarding  Musculoskeletal:         General: No swelling or tenderness  Cervical back: Normal range of motion and neck supple  No rigidity or tenderness  Right lower leg: No edema  Left lower leg: No edema  Skin:     General: Skin is warm and dry  Capillary Refill: Capillary refill takes less than 2 seconds  Findings: No lesion or rash  Neurological:      General: No focal deficit present  Mental Status: She is alert  Psychiatric:         Mood and Affect: Mood normal          Behavior: Behavior normal          Thought Content:  Thought content normal          Judgment: Judgment normal         Additional Data:     Labs:  Results from last 7 days   Lab Units 04/14/23  0458   WBC Thousand/uL 15 11*   HEMOGLOBIN g/dL 12 8   HEMATOCRIT % 39 5   PLATELETS Thousands/uL 218   NEUTROS PCT % 91*   LYMPHS PCT % 6*   MONOS PCT % 2*   EOS PCT % 0     Results from last 7 days   Lab Units 04/14/23  0458   SODIUM mmol/L 136   POTASSIUM mmol/L 4 4   CHLORIDE mmol/L 101   CO2 mmol/L 26   BUN mg/dL 23   CREATININE mg/dL 0 73   ANION GAP mmol/L 9   CALCIUM mg/dL 9 0   ALBUMIN g/dL 3 5   TOTAL BILIRUBIN mg/dL 0 24   ALK PHOS U/L 68   ALT U/L 17   AST U/L 15   GLUCOSE RANDOM mg/dL 116     Results from last 7 days   Lab Units 04/11/23  1715   INR  1 02                   Lines/Drains:  Invasive Devices     Peripheral Intravenous Line  Duration           Peripheral IV 04/12/23 Left;Ventral (anterior) Forearm 1 day                      Imaging: Reviewed radiology reports from this admission including: CT head    CTA head and neck with and without contrast    Result Date: 4/11/2023  Impression: No acute intracranial pathology  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  No significant stenosis of the cervical carotid or vertebral arteries  No significant intracranial stenosis, large vessel occlusion or aneurysm  Workstation performed: [id number]     XR chest 1 view portable    Result Date: 4/12/2023  Impression: No acute cardiopulmonary disease  Workstation performed: [id number]       No Chest XR results available for this patient  Recent Cultures (last 7 days):         Last 24 Hours Medication List:   Current Facility-Administered Medications   Medication Dose Route Frequency Provider Last Rate   • acetaminophen  650 mg Oral Q6H PRN Catherine Alcaraz PA-C     • escitalopram  10 mg Oral QPM Catherine Alcaraz PA-C     • fluticasone  1 spray Each Nare Daily [place], [place]     • heparin (porcine)  5,000 Units Subcutaneous [place], [place]     • hydrALAZINE  10 mg Intravenous Q6H PRN Anders Paulino DO     • labetalol  10 mg Intravenous Q6H PRN Andesr Paulino DO     • levothyroxine  75 mcg Oral Daily [facility]     • lisinopril  5 mg Oral QPM Catherine Alcaraz PA-C     • loratadine  10 mg Oral Daily Catherine Alcaraz PA-C          Today, Patient Was Seen By: Nilton Beremo DO    **Please Note: This note may have been constructed using a voice recognition system  **

## 2023-04-14 NOTE — ASSESSMENT & PLAN NOTE
"Patient reports worsening bilateral jaw and temple pain when chewing for the past 3 weeks  She has associated on/off bilateral visual disturbance associated with the symptoms  She was seen by [facility] eye as outpatient and told the issue was not due to her eyes  Seen by her PCP and had outpatient sed rate which was normal, and CRP which was elevated to 6 1  Denies other symptoms or complaint at this time      /86   Pulse 72   Temp 97 7 °F (36 5 °C)   Resp 17   Ht 5' 2\" (1 575 m)   Wt 61 kg (134 lb 7 7 oz)   SpO2 94%   BMI 24 60 kg/m²     Bilateral jaw and temple pain when chewing for the past three weeks  · Has associated on/off visual disturbance associated with symptoms  · Hx of polymyalgia rheumatica  · On admission, right temporal artery was firm, non-tender  · [facility] and told the issue was not due to her eyes   · CTA head/neck negative for acute process    Lab Results   Component Value Date    ESR 60 (H) 04/11/2023    ESR 23 04/03/2023    CRP 39 2 (H) 04/11/2023    CRP 6 1 (H) 04/03/2023     · Patient undergoing temporal arteritis work-up  · Temporal artery Dopplers are not able to be performed at this campus and as a result we will be undergoing temporal artery biopsy  · General surgery has been consulted with the assistance of neurology-plan for procedure today  · N p o  until procedure today  · Continue IV Solu-Medrol  · Appreciate neurology/surgery input    "

## 2023-04-14 NOTE — CASE MANAGEMENT
Case Management Discharge Planning Note    Patient name Maggie Valdez  Location [address] [id number] MRN [id number]  : 1940 Date 2023       Current Admission Date: 2023  Current Admission Diagnosis:Visual disturbance   Patient Active Problem List    Diagnosis Date Noted   • Amaurosis fugax of left eye    • Visual disturbance 2023   • Current moderate episode of major depressive disorder without [facility] episode [facility] ) 2022   • Polyarthritis with positive rheumatoid factor ([facility] ) 2022   • Depression 2021   • Allergic rhinitis 2015   • Vitamin D deficiency 2014   • Hyperlipidemia 2014   • Hypertension, benign 2014   • Hypothyroidism 2014      LOS (days): 3  Geometric Mean LOS (GMLOS) (days): 2 20  Days to GMLOS:-0 8     OBJECTIVE:  Risk of Unplanned Readmission Score: 9 57         Current admission status: Inpatient   Preferred Pharmacy:   [address] [address]  Phone: [phone] Fax: [phone] ([facility] ) - [address]  Phone: [phone] Fax: [phone]    Primary Care Provider: Fabian Garcia MD    Primary Insurance: MEDICARE  Secondary Insurance: AARP    DISCHARGE DETAILS:                                          Other Referral/Resources/Interventions Provided:  Referral Comments: Pt discussed at [facility];  no post d/c needs identified  Pt for temporal artery bx today           Treatment Team Recommendation: Home  Discharge Destination Plan[de-identified] Home  Transport at Discharge : Family

## 2023-04-14 NOTE — OP NOTE
OPERATIVE REPORT  PATIENT NAME: Nancy Morillo    :  1940  MRN: [id number]  Pt Location: [facility] OR ROOM 03    SURGERY DATE: 2023    Surgeon(s) and Role:     * Amira Barrios MD - Primary     * Bre Zapien PA-C - Assisting    Preop Diagnosis:  Visual disturbance [H53 9]  Temporal arteritis syndrome    Post-Op Diagnosis Codes: * Visual disturbance [H53 9]  Temporal arteritis syndrome    Procedure(s):  Left - BIOPSY ARTERY TEMPORAL    Specimen(s):  ID Type Source Tests Collected by Time Destination   1 : LEFT Temporal Artery Tissue Artery TISSUE EXAM Amira Barrios MD 2023 1124        Estimated Blood Loss:   Minimal    Drains:  None    Anesthesia Type:   IV Sedation with Anesthesia    Operative Indications:  Visual disturbance [H53 9]    Operative Findings: The artery appeared thickened with inflammatory process as well as the vein  Complications:   None    Procedure and Technique:  The patient was identified and the patient was placed in the operating table in a supine position  After adequate IV sedation the left preauricular area was prepped and draped in a sterile usual fashion with Betadine solution  Timeout was called the patient was identified as was surgical site  1% lidocaine plain was infiltrated over the preauricular area, and incision was made with scalpel, taken down through the subcutaneous tissue with mosquito clamp until the artery and vein were identified  The artery was dissected for approximately 1 5 cm, ligated with 3-0 Vicryl and scissors proximally and distally on the divided with scissors  Hemostasis was accomplished with cautery  Incision was approximated with 4-0 Vicryl in a continuous cuticular fashion  Sterile dressings were applied  At the end of the case instrument, needles, and sponges counts were correct  Patient tolerated procedure well  I was present and scrubbed for the entire procedure    I performed the procedure, a qualified resident was not available  A physician assistant was required during the procedure for retraction tissue handling,dissection and suturing    Patient Disposition:  PACU - hemodynamically stable          SIGNATURE: Tali Garcia MD  DATE: April 14, 2023  TIME: 11:38 AM

## 2023-04-14 NOTE — ASSESSMENT & PLAN NOTE
Blood Pressure: 166/86    · Continue home lisinopril  · As needed antihypertensives as appropriate  · Monitor BP per unit protocol

## 2023-04-15 VITALS
OXYGEN SATURATION: 97 % | TEMPERATURE: 97.7 F | BODY MASS INDEX: 24.75 KG/M2 | RESPIRATION RATE: 17 BRPM | WEIGHT: 134.48 LBS | HEIGHT: 62 IN | DIASTOLIC BLOOD PRESSURE: 78 MMHG | HEART RATE: 67 BPM | SYSTOLIC BLOOD PRESSURE: 153 MMHG

## 2023-04-15 PROBLEM — M31.6 TEMPORAL ARTERITIS SYNDROME (HCC): Status: ACTIVE | Noted: 2023-04-15

## 2023-04-15 LAB
ALBUMIN SERPL BCP-MCNC: 3.1 G/DL (ref 3.5–5)
ALP SERPL-CCNC: 55 U/L (ref 34–104)
ALT SERPL W P-5'-P-CCNC: 46 U/L (ref 7–52)
ANION GAP SERPL CALCULATED.3IONS-SCNC: 5 MMOL/L (ref 4–13)
AST SERPL W P-5'-P-CCNC: 38 U/L (ref 13–39)
BASOPHILS # BLD AUTO: 0 THOUSANDS/ΜL (ref 0–0.1)
BASOPHILS NFR BLD AUTO: 0 % (ref 0–1)
BILIRUB SERPL-MCNC: 0.25 MG/DL (ref 0.2–1)
BUN SERPL-MCNC: 22 MG/DL (ref 5–25)
CALCIUM ALBUM COR SERPL-MCNC: 9.2 MG/DL (ref 8.3–10.1)
CALCIUM SERPL-MCNC: 8.5 MG/DL (ref 8.4–10.2)
CHLORIDE SERPL-SCNC: 100 MMOL/L (ref 96–108)
CO2 SERPL-SCNC: 30 MMOL/L (ref 21–32)
CREAT SERPL-MCNC: 0.72 MG/DL (ref 0.6–1.3)
EOSINOPHIL # BLD AUTO: 0 THOUSAND/ΜL (ref 0–0.61)
EOSINOPHIL NFR BLD AUTO: 0 % (ref 0–6)
ERYTHROCYTE [DISTWIDTH] IN BLOOD BY AUTOMATED COUNT: 13.1 % (ref 11.6–15.1)
GFR SERPL CREATININE-BSD FRML MDRD: 78 ML/MIN/1.73SQ M
GLUCOSE SERPL-MCNC: 103 MG/DL (ref 65–140)
HCT VFR BLD AUTO: 37.7 % (ref 34.8–46.1)
HGB BLD-MCNC: 12.1 G/DL (ref 11.5–15.4)
IMM GRANULOCYTES # BLD AUTO: 0.07 THOUSAND/UL (ref 0–0.2)
IMM GRANULOCYTES NFR BLD AUTO: 1 % (ref 0–2)
LYMPHOCYTES # BLD AUTO: 1.22 THOUSANDS/ΜL (ref 0.6–4.47)
LYMPHOCYTES NFR BLD AUTO: 12 % (ref 14–44)
MCH RBC QN AUTO: 29.7 PG (ref 26.8–34.3)
MCHC RBC AUTO-ENTMCNC: 32.1 G/DL (ref 31.4–37.4)
MCV RBC AUTO: 92 FL (ref 82–98)
MONOCYTES # BLD AUTO: 0.55 THOUSAND/ΜL (ref 0.17–1.22)
MONOCYTES NFR BLD AUTO: 5 % (ref 4–12)
NEUTROPHILS # BLD AUTO: 8.73 THOUSANDS/ΜL (ref 1.85–7.62)
NEUTS SEG NFR BLD AUTO: 82 % (ref 43–75)
NRBC BLD AUTO-RTO: 0 /100 WBCS
PLATELET # BLD AUTO: 201 THOUSANDS/UL (ref 149–390)
PMV BLD AUTO: 9.5 FL (ref 8.9–12.7)
POTASSIUM SERPL-SCNC: 4.2 MMOL/L (ref 3.5–5.3)
PROT SERPL-MCNC: 5.8 G/DL (ref 6.4–8.4)
RBC # BLD AUTO: 4.08 MILLION/UL (ref 3.81–5.12)
SODIUM SERPL-SCNC: 135 MMOL/L (ref 135–147)
WBC # BLD AUTO: 10.57 THOUSAND/UL (ref 4.31–10.16)

## 2023-04-15 RX ORDER — OMEPRAZOLE 40 MG/1
40 CAPSULE, DELAYED RELEASE ORAL DAILY
Qty: 90 CAPSULE | Refills: 0 | Status: SHIPPED | OUTPATIENT
Start: 2023-04-15

## 2023-04-15 RX ORDER — PREDNISONE 10 MG/1
TABLET ORAL
Qty: 294 TABLET | Refills: 0 | Status: CANCELLED | OUTPATIENT
Start: 2023-04-15 | End: 2023-07-08

## 2023-04-15 RX ORDER — PANTOPRAZOLE SODIUM 40 MG/1
40 TABLET, DELAYED RELEASE ORAL DAILY
Qty: 90 TABLET | Refills: 0 | Status: CANCELLED | OUTPATIENT
Start: 2023-04-15

## 2023-04-15 RX ORDER — PREDNISONE 10 MG/1
TABLET ORAL
Qty: 294 TABLET | Refills: 0 | Status: SHIPPED | OUTPATIENT
Start: 2023-04-15 | End: 2023-07-08

## 2023-04-15 RX ADMIN — HEPARIN SODIUM 5000 UNITS: 5000 INJECTION INTRAVENOUS; SUBCUTANEOUS at 05:29

## 2023-04-15 RX ADMIN — LEVOTHYROXINE SODIUM 75 MCG: 25 TABLET ORAL at 05:30

## 2023-04-15 RX ADMIN — LORATADINE 10 MG: 10 TABLET ORAL at 08:47

## 2023-04-15 NOTE — NURSING NOTE
Discharge documents given to the patient  Patient demonstrated understanding of given instructions  IV removed  Dustin Bubba in place  Patient satisfied with the hospital stay and its staff

## 2023-04-15 NOTE — ASSESSMENT & PLAN NOTE
Lab Results   Component Value Date    CHOLESTEROL 172 09/26/2022    TRIG 76 09/26/2022    HDL 52 09/26/2022    LDLCALC 105 (H) 09/26/2022     · Not currently on outpatient statin medication  Recommend incorporating a more whole foods plant-predominant diet along with decreasing consumption of red meats and processed foods  Per AHA guidelines, recommend moderate-vigorous intensity exercise for 30 minutes a day for 5 days a week or a total of 150 min/week

## 2023-04-15 NOTE — ASSESSMENT & PLAN NOTE
"Patient reports worsening bilateral jaw and temple pain when chewing for the past 3 weeks  She has associated on/off bilateral visual disturbance associated with the symptoms  She was seen by [facility] eye as outpatient and told the issue was not due to her eyes  Seen by her PCP and had outpatient sed rate which was normal, and CRP which was elevated to 6 1  Denies other symptoms or complaint at this time      /78   Pulse 67   Temp 97 7 °F (36 5 °C)   Resp 17   Ht 5' 2\" (1 575 m)   Wt 61 kg (134 lb 7 7 oz)   SpO2 94%   BMI 24 60 kg/m²     Bilateral jaw and temple pain when chewing for the past three weeks  · Has associated on/off visual disturbance associated with symptoms  · Hx of polymyalgia rheumatica  · On admission, right temporal artery was firm, non-tender  · [facility] and told the issue was not due to her eyes   · CTA head/neck negative for acute process    Lab Results   Component Value Date    ESR 60 (H) 04/11/2023    ESR 23 04/03/2023    CRP 39 2 (H) 04/11/2023    CRP 6 1 (H) 04/03/2023     · Likely 2/2 temporal arteritis  · Appreciate neurology/surgery input  · See management under temporal arteritis    "

## 2023-04-15 NOTE — ASSESSMENT & PLAN NOTE
"Writer contacted patient via phone to let her know MD was going to call Percocet 10/325 into pharmacy for her. Patient very grateful. States she is nervous about her surgery and "meeting the surgeon who is going to do it on the day of my operation". Writer told patient she would contact [facility] to see if she could meet MD prior to surgery, or if unable to meet him she could at least talk on phone to have a voice she was familiar with when she went into surgery. Patient states she appreciates this and will call with any further questions.   " Temporal artery Dopplers could not be performed at this campus and as a result, patient underwent biopsy on 4/14  Follow-up results with PCP  · Given history of polymyalgia rheumatica, patient does have a high risk of developing temporal arteritis  · Ambulatory referral for rheumatology  · Prednisone 60 mg daily x2 weeks THEN taper by 10 mg every 2 weeks and continue 10 mg until followed up by rheumatology

## 2023-04-15 NOTE — ASSESSMENT & PLAN NOTE
Blood Pressure: 153/78    · Continue home lisinopril  · As needed antihypertensives as appropriate  · Monitor BP per unit protocol

## 2023-04-15 NOTE — ASSESSMENT & PLAN NOTE
Lab Results   Component Value Date    CGX4CCBNINJT 0 811 09/26/2022    FREET4 1 01 09/26/2022       · Continue home levothyroxine

## 2023-04-15 NOTE — DISCHARGE SUMMARY
"[facility]  Discharge- Christiana Mei 1940, 82 y o  female MRN: [id number]  Unit/Bed#: -01 Encounter: [id number]  Primary Care Provider: Lou Guzman MD   Date and time admitted to hospital: 4/11/2023  4:14 PM    * Temporal arteritis syndrome [facility])  Assessment & Plan  Temporal artery Dopplers could not be performed at this campus and as a result, patient underwent biopsy on 4/14  Follow-up results with PCP  · Given history of polymyalgia rheumatica, patient does have a high risk of developing temporal arteritis  · Ambulatory referral for rheumatology  · Prednisone 60 mg daily x2 weeks THEN taper by 10 mg every 2 weeks and continue 10 mg until followed up by rheumatology    Visual disturbance  Assessment & Plan  Patient reports worsening bilateral jaw and temple pain when chewing for the past 3 weeks  She has associated on/off bilateral visual disturbance associated with the symptoms  She was seen by [facility] as outpatient and told the issue was not due to her eyes  Seen by her PCP and had outpatient sed rate which was normal, and CRP which was elevated to 6 1  Denies other symptoms or complaint at this time      /78   Pulse 67   Temp 97 7 °F (36 5 °C)   Resp 17   Ht 5' 2\" (1 575 m)   Wt 61 kg (134 lb 7 7 oz)   SpO2 94%   BMI 24 60 kg/m²     Bilateral jaw and temple pain when chewing for the past three weeks  · Has associated on/off visual disturbance associated with symptoms  · Hx of polymyalgia rheumatica  · On admission, right temporal artery was firm, non-tender  · [facility] and told the issue was not due to her eyes   · CTA head/neck negative for acute process    Lab Results   Component Value Date    ESR 60 (H) 04/11/2023    ESR 23 04/03/2023    CRP 39 2 (H) 04/11/2023    CRP 6 1 (H) 04/03/2023     · Likely 2/2 temporal arteritis  · Appreciate neurology/surgery input  · See management under temporal arteritis      Polyarthritis with positive rheumatoid " factor ([address] )  Assessment & Plan  · Controlled with prn ibuprofen at home   · If HTN difficult to control, will need to discontinue use of NSAIDs    Hypothyroidism  Assessment & Plan  Lab Results   Component Value Date    AAL4IODKRAJN 0 811 09/26/2022    FREET4 1 01 09/26/2022       · Continue home levothyroxine    Hypertension, benign  Assessment & Plan  Blood Pressure: 153/78    · Continue home lisinopril  · As needed antihypertensives as appropriate  · Monitor BP per unit protocol    Hyperlipidemia  Assessment & Plan  Lab Results   Component Value Date    CHOLESTEROL 172 09/26/2022    TRIG 76 09/26/2022    HDL 52 09/26/2022    [address] 105 (H) 09/26/2022     · Not currently on outpatient statin medication  Recommend incorporating a more whole foods plant-predominant diet along with decreasing consumption of red meats and processed foods  Per AHA guidelines, recommend moderate-vigorous intensity exercise for 30 minutes a day for 5 days a week or a total of 150 min/week          Medical Problems     Resolved Problems  Date Reviewed: 4/15/2023   None       Discharging Physician / Practitioner: Natanael Villanueva DO  PCP: Leticia Trotter MD  Admission Date:   Admission Orders (From admission, onward)     Ordered        04/11/23 2009  INPATIENT ADMISSION  Once                      Discharge Date: 04/15/23    Consultations During Hospital Stay:  · Neuro  · Surgery    Procedures Performed:   · Temporal artery biopsy    Significant Findings / Test Results:   CTA head and neck with and without contrast    Result Date: 4/11/2023  Impression: No acute intracranial pathology  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  No significant stenosis of the cervical carotid or vertebral arteries  No significant intracranial stenosis, large vessel occlusion or aneurysm  Workstation performed: [id number]     XR chest 1 view portable    Result Date: 4/12/2023  Impression: No acute cardiopulmonary disease   Workstation performed: "[id number]       No Chest XR results available for this patient  No results found for this or any previous visit  No results for input(s): Kaitlynn Rowland, FIFITAIN, URINECX, WOUNDCULT, BODYFLUIDCUL, MRSACULTURE, INFLUAPCR, INFLUBPCR, RSVPCR, LEGIONELLAUR, STPU, CDIFFTOXINB in the last 72 hours  Incidental Findings:   · None     Test Results Pending at Discharge (will require follow up): · Biopsy results     Outpatient Tests Requested:  · None    Complications:  None    Reason for Admission:   Chief Complaint   Patient presents with   • Jaw Pain     Chewing causing jaw and temple pain x 3 wks, then started trouble w vision x 1 wk ago, [facility], and was told to go to pcp, vision is \"puzzle gray, worse with movement, intermittent\" denies pain in eyes          Hospital Course:   Walt Brewster is a 82 y o  female patient who originally presented to the hospital on 4/11/2023 due to jaw pain along with visual deficit  Admitted for suspicion for temporal arteritis given patient's history of polymyalgia rheumatica  Neurology was brought on board along with general surgery for possible biopsy  Initial plan was to have temporal artery Dopplers obtained but due to limitation of staffing, temporal artery doppler are not available at this campus  Patient ultimately underwent temporal artery biopsy and tolerated procedure without issue  Was instructed to be following up with outpatient PCP, neurology, rheumatology  In addition, was discharged on prolonged steroid taper and PPI  All questions were answered for the patient  Stable for discharge    Please see above list of diagnoses and related plan for additional information  Condition at Discharge: stable    Discharge Day Visit / Exam:   Subjective: Lia Erps to go home today    All questions answered  Vitals: Blood Pressure: 153/78 (04/15/23 0649)  Pulse: 67 (04/15/23 0649)  Temperature: 97 7 °F (36 5 °C) (04/15/23 0649)  Temp Source: Temporal " "(04/14/23 1039)  Respirations: 17 (04/15/23 0649)  Height: 5' 2\" (157 5 cm) (04/12/23 1711)  Weight - Scale: 61 kg (134 lb 7 7 oz) (04/12/23 1711)  SpO2: 97 % (04/15/23 0900)  Exam:   Physical Exam  Vitals and nursing note reviewed  Constitutional:       General: She is not in acute distress  Appearance: Normal appearance  HENT:      Head: Normocephalic  Right Ear: External ear normal       Left Ear: External ear normal       Nose: Nose normal       Mouth/Throat:      Mouth: Mucous membranes are moist    Eyes:      Pupils: Pupils are equal, round, and reactive to light  Cardiovascular:      Rate and Rhythm: Normal rate and regular rhythm  Pulses: Normal pulses  Heart sounds: Normal heart sounds  No murmur heard  Pulmonary:      Effort: Pulmonary effort is normal  No respiratory distress  Breath sounds: Normal breath sounds  No wheezing or rales  Chest:      Chest wall: No tenderness  Abdominal:      General: Bowel sounds are normal  There is no distension  Palpations: Abdomen is soft  There is no mass  Tenderness: There is no abdominal tenderness  There is no guarding  Musculoskeletal:         General: No swelling or tenderness  Cervical back: Normal range of motion and neck supple  No rigidity or tenderness  Right lower leg: No edema  Left lower leg: No edema  Skin:     General: Skin is warm and dry  Capillary Refill: Capillary refill takes less than 2 seconds  Findings: No lesion or rash  Neurological:      General: No focal deficit present  Mental Status: She is alert  Psychiatric:         Mood and Affect: Mood normal          Behavior: Behavior normal          Thought Content: Thought content normal          Judgment: Judgment normal           Discussion with Family: Patient declined call to        Discharge instructions/Information to patient and family:   See after visit summary for information provided to patient " and family  Provisions for Follow-Up Care:  See after visit summary for information related to follow-up care and any pertinent home health orders  Disposition:   Home    Planned Readmission: none      Discharge Statement:  I spent 58 minutes discharging the patient  This time was spent on the day of discharge  I had direct contact with the patient on the day of discharge  Greater than 50% of the total time was spent examining patient, answering all patient questions, arranging and discussing plan of care with patient as well as directly providing post-discharge instructions  Additional time then spent on discharge activities  Discharge Medications:  See after visit summary for reconciled discharge medications provided to patient and/or family        **Please Note: This note may have been constructed using a voice recognition system**

## 2023-04-25 ENCOUNTER — TELEPHONE (OUTPATIENT)
Dept: NEUROLOGY | Facility: CLINIC | Age: 83
End: 2023-04-25

## 2023-04-25 NOTE — TELEPHONE ENCOUNTER
Hello,     Can you please advise of HFU instructions? Specialty and if an AP ok to see the patient and or Attending only?        Recommendations for outpatient neurological follow up have yet to be determined    HFU/KIARA/VISUAL DISTURBANCE    DISCHARGED HOME = 04/15/23      Thank you for your time,     Cresencio Miller

## 2023-04-25 NOTE — TELEPHONE ENCOUNTER
1st Attempt,     Called pt no answer, No VM set up, phone just rings and then hangs up, no mychart set up either      Thank you,     Margo Jenkins

## 2023-04-28 ENCOUNTER — OFFICE VISIT (OUTPATIENT)
Dept: RHEUMATOLOGY | Facility: CLINIC | Age: 83
End: 2023-04-28

## 2023-04-28 VITALS
SYSTOLIC BLOOD PRESSURE: 132 MMHG | WEIGHT: 130 LBS | HEIGHT: 62 IN | BODY MASS INDEX: 23.92 KG/M2 | DIASTOLIC BLOOD PRESSURE: 76 MMHG

## 2023-04-28 DIAGNOSIS — M05.80 POLYARTHRITIS WITH POSITIVE RHEUMATOID FACTOR (HCC): ICD-10-CM

## 2023-04-28 DIAGNOSIS — Z79.52 CURRENT USE OF STEROID MEDICATION: ICD-10-CM

## 2023-04-28 DIAGNOSIS — G45.3 AMAUROSIS FUGAX OF LEFT EYE: ICD-10-CM

## 2023-04-28 DIAGNOSIS — R79.82 ELEVATED C-REACTIVE PROTEIN (CRP): ICD-10-CM

## 2023-04-28 DIAGNOSIS — R70.0 ELEVATED SED RATE (ELEV SR): ICD-10-CM

## 2023-04-28 DIAGNOSIS — M31.6 TEMPORAL ARTERITIS SYNDROME (HCC): Primary | ICD-10-CM

## 2023-04-28 NOTE — PROGRESS NOTES
Assessment and Plan:  Ms Ming Rivers is an 81yo female with PMHx significant for PMR, polyarthritis with positive RF, HTN, hypothyroidism who presents to the rheumatology office to establish care for biopsy-proven temporal arteritis  She initially presented to the ED on 4/11/23 c/o with worsening bilateral jaw and temporal pain when chewing for the past 3 weeks  She had associated on/off bilateral visual disturbance associated with the symptoms  She was seen by SARAH DILLON MARY as outpatient and told the issue was not due to her eyes  Due to the inability to obtain temporal artery doppler at [facility], she underwent left temporal biopsy on 4/14 by Dr Reza Perez  The artery appeared thickened with inflammatory process as well as the vein  She was initiated on prednisone 60mg PO QD, decreasing by 10mg Q2 weeks  The patient does have a past history of polymyalgia rheumatica which presented as stiffness of the shoulder and hip girdles, initially diagnosed approximately March 2022  She was treated with a prednisone taper at that time and the patient reported resolution of symptoms  When diagnosed with this recent episode of temporal arteritis, she denies stiffness of the shoulder or hip girdles  She notes that since starting the prednisone, her vision loss has improved slightly, she is no longer having headaches, temporal tenderness, scalp tenderness, or jaw pain when chewing  We discussed that we will continue on the same prednisone taper as she is taking now and she will see me next on 6/30/23 (prednisone taper is scheduled to be completed on 7/8/23)  Should she experience worsening of symptoms, she should reach out to us immediately and we will likely increase the prednisone back to the most recent effective dose  I have ordered labs for the patient to obtain prior to her next visit to monitor inflammation markers  She should continue follow-up with [facility] as directed      The patient does have a history of low titer rheumatoid factor at 32 IU/mL from  12/2021  The patient does not exhibit any signs or symptoms consistent with active rheumatoid arthritis such as morning stiffness, joint swelling, joint pain  It is in this setting that we will continue to monitor for symptoms, but we will not initiate any specific treatment for rheumatoid arthritis at this time  Her recent DEXA scan revealing normal bone density supports the lack of inflammatory arthritis  She should continue with her One-A-Day multivitamin which contains calcium and vitamin D as well as weightbearing exercises as tolerated  Follow-up in 2 months  Plan:  Diagnoses and all orders for this visit:    Temporal arteritis syndrome ([facility] [address] )  -     CBC and differential; Future  -     Comprehensive metabolic panel; Future  -     C-reactive protein; Future  -     Sedimentation rate, automated; Future    Amaurosis fugax of left eye    Current use of steroid medication  -     CBC and differential; Future  -     Comprehensive metabolic panel; Future    Elevated sed rate (elev SR)  -     CBC and differential; Future  -     Comprehensive metabolic panel; Future  -     Sedimentation rate, automated; Future    Elevated C-reactive protein (CRP)  -     CBC and differential; Future  -     Comprehensive metabolic panel; Future  -     C-reactive protein; Future    Polyarthritis with positive rheumatoid factor (HCC)  -     CBC and differential; Future  -     Comprehensive metabolic panel; Future  -     C-reactive protein; Future  -     Sedimentation rate, automated; Future        I have personally reviewed prior notes, recent laboratory results, and pertinent films in PACS  Activities as tolerated  Exercise: try to maintain a low impact exercise regimen as much as possible  Walk for 30 minutes a day for at least 3 days a week  Continue other medications as prescribed by PCP and other specialists         RTC in 2 months      Follow-up plan: 2 months        Chief "Complaint  No chief complaint on file  \" My vision turned gray out of nowhere in my left eye\"    Rheumatic Disease Summary:  1  Biopsy-proven temporal arteritis  -Initial visit  2  History of PMR, March 2022  -Presented with stiffness in the shoulder girdle and hip girdle  CRP 9 8, sed rate 19  Resolved with a steroid course  3   Positive rheumatoid factor (32 IU/mL)  -Resulted on labs from 12/2021   -No active RA on exam   4   Other comorbidities: Hypertension, hypothyroidism, hyperlipidemia    HPI  Janette Mcelroy is a 82 y o   female who presents as a Rheumatology consult referred by Elsy Jimenes MD for evaluation of temporal arteritis  She initially presented to the ED on 4/11/23 c/o with worsening bilateral jaw and temporal pain when chewing for the past 3 weeks  She had associated on/off bilateral visual disturbance associated with the symptoms  She was seen by [facility] as outpatient and told the issue was not due to her eyes  Due to the inability to obtain temporal artery doppler at [facility], she underwent left temporal biopsy on 4/14 by Dr Sheila De Souza  The artery appeared thickened with inflammatory process as well as the vein  She was initiated on prednisone 60mg PO QD, decreasing by 10mg Q2 weeks  The patient does have a past history of polymyalgia rheumatica which presented as stiffness of the shoulder and hip girdles, initially diagnosed approximately March 2022  She was treated with a prednisone taper at that time and the patient reported resolution of symptoms  When diagnosed with this recent episode of temporal arteritis, she denies stiffness of the shoulder or hip girdles  She notes that since starting the prednisone (currently still on 60 mg daily), her vision loss has improved slightly, she is no longer having headaches, temporal tenderness, scalp tenderness, or jaw pain when chewing    She recalls maybe 1 or 2 episodes of subjective fever with initial presentation, but none " recently  She reports she has follow-ups scheduled with ophthalmology and has seen them since this initial presentation  The patient denies any morning stiffness of the joints, swelling of the joints, heat of the joints  Review of Systems  Review of Systems  Constitutional: Negative for weight change, fevers, chills, night sweats, fatigue  ENT/Mouth: Negative for hearing changes, ear pain, nasal congestion, sinus pain, hoarseness, sore throat, rhinorrhea, swallowing difficulty  Eyes: Negative for pain, redness, discharge, vision changes  Cardiovascular: Negative for chest pain, SOB, palpitations  Respiratory: Negative for cough, sputum, wheezing, dyspnea  Gastrointestinal: Negative for nausea, vomiting, diarrhea, constipation, pain, heartburn  Genitourinary: Negative for dysuria, urinary frequency, hematuria  Musculoskeletal: As per HPI  Skin: Negative for skin rash, color changes  Neuro: Negative for weakness, numbness, tingling, loss of consciousness  Psych: Negative for anxiety, depression  Heme/Lymph: Negative for easy bruising, bleeding, lymphadenopathy        Allergies  Allergies   Allergen Reactions   • Sulfa Antibiotics Hives   • Latex Rash       Home Medications    Current Outpatient Medications:   •  escitalopram (LEXAPRO) 10 mg tablet, TAKE 1 TABLET BY MOUTH  DAILY, Disp: 90 tablet, Rfl: 3  •  fexofenadine (ALLEGRA) 180 MG tablet, Take 180 mg by mouth daily, Disp: , Rfl:   •  fluticasone (FLONASE) 50 mcg/act nasal spray, SHAKE LIQUID AND USE 1 SPRAY IN EACH NOSTRIL DAILY, Disp: 48 g, Rfl: 3  •  levothyroxine 75 mcg tablet, TAKE 1 TABLET BY MOUTH  DAILY, Disp: 90 tablet, Rfl: 3  •  lisinopril (ZESTRIL) 5 mg tablet, TAKE 1 TABLET BY MOUTH  DAILY, Disp: 90 tablet, Rfl: 3  •  omeprazole (PriLOSEC) 40 MG capsule, Take 1 capsule (40 mg total) by mouth daily, Disp: 90 capsule, Rfl: 0  •  predniSONE 10 mg tablet, Take 6 tablets (60 mg total) by mouth daily for 14 days, THEN 5 tablets (50 mg "total) daily for 14 days, THEN 4 tablets (40 mg total) daily for 14 days, THEN 3 tablets (30 mg total) daily for 14 days, THEN 2 tablets (20 mg total) daily for 14 days, THEN 1 tablet (10 mg total) daily for 14 days  , Disp: 294 tablet, Rfl: 0  •  Lumigan 0 01 % ophthalmic drops, , Disp: , Rfl:     Past Medical History  Past Medical History:   Diagnosis Date   • Depression 12/30/2021   • Lumbar stenosis    • Transient global amnesia        Past Surgical History   Past Surgical History:   Procedure Laterality Date   • CATARACT EXTRACTION     • CA LIGATION/BIOPSY TEMPORAL ARTERY Left 4/14/2023    Procedure: BIOPSY ARTERY TEMPORAL;  Surgeon: Nancy Goode MD;  Location: [facility];  Service: General       Family History    Family History   Problem Relation Age of Onset   • Stroke Mother 61        CVA   • Pancreatic cancer Father    • Hyperlipidemia Sister      No known family history of autoimmune or inflammatory diseases  Social History  Occupation: Retired  Social History     Substance and Sexual Activity   Alcohol Use Yes    Comment: glass white wine with dinner     Social History     Substance and Sexual Activity   Drug Use No     Social History     Tobacco Use   Smoking Status Former   Smokeless Tobacco Never       Objective:  Vitals:    04/28/23 1127   BP: 132/76   Weight: 59 kg (130 lb)   Height: 5' 2\" (1 575 m)       Physical Exam  General: Well appearing, well nourished, in no acute distress  Oriented x 3, normal mood and affect  Ambulating without difficulty  Skin: Incision from temporal artery biopsy on left side appreciated - C/D/I  Good turgor, no rash, unusual bruising or prominent lesions  Hair: Normal texture and distribution  Nails: Normal color, no deformities  HEENT:  Head: No scalp tenderness, no temporal tenderness, no preauricular tenderness, normocephalic, atraumatic  Eyes: Conjunctiva clear, sclera non-icteric, EOM intact     Neck: Supple, thyroid non-enlarged and non-tender   No " lymphadenopathy  Heart: Regular rate and rhythm, no murmur or gallop  Lungs: Clear to auscultation, no crackles or wheezing  Extremities: No amputations or deformities, cyanosis, edema  Musculoskeletal:   No asymmetry or deformities noted of bilateral upper and lower extremities  No pain or swelling of joints bilaterally to include: shoulder, elbow, wrist, MCP I-V, PIP I-V, knee, ankle, MTP I-V  Neurologic: Alert and oriented  No focal neurological deficits appreciated  Psychiatric: Normal mood and affect  Reviewed labs and imaging  Imaging:   CTA head and neck with and without contrast    Result Date: 4/11/2023  Narrative: CTA NECK AND BRAIN WITH AND WITHOUT CONTRAST INDICATION: visual field change COMPARISON:   None  TECHNIQUE:  Routine CT imaging of the Brain without contrast   Post contrast imaging was performed after administration of iodinated contrast through the neck and brain  Post contrast axial 0 625 mm images timed to opacify the arterial system  3D rendering was performed on an independent workstation  MIP reconstructions performed  Coronal reconstructions were performed of the noncontrast portion of the brain  Radiation dose length product (DLP) for this visit:  [id number] mGy-cm   This examination, like all CT scans performed in the [facility], was performed utilizing techniques to minimize radiation dose exposure, including the use of iterative reconstruction and automated exposure control  IV Contrast:  100 mL of iohexol (OMNIPAQUE)  IMAGE QUALITY:   Diagnostic FINDINGS: NONCONTRAST BRAIN PARENCHYMA:  No intracranial mass, mass effect or midline shift  No CT signs of acute infarction  No acute parenchymal hemorrhage  Diminished attenuation in the periventricular and subcortical white matter due to chronic microangiopathy  Chronic lacunar infarcts in the basal ganglia, corona radiata and centrum semiovale   VENTRICLES AND EXTRA-AXIAL SPACES:  Normal for the patient's age  VISUALIZED ORBITS: Bilateral lens replacement  PARANASAL SINUSES: Normal visualized paranasal sinuses  CERVICAL VASCULATURE AORTIC ARCH AND GREAT VESSELS:  Normal aortic arch and great vessel origins  Normal visualized subclavian vessels  RIGHT VERTEBRAL ARTERY CERVICAL SEGMENT:  Normal origin  The vessel is normal in caliber throughout the neck  LEFT VERTEBRAL ARTERY CERVICAL SEGMENT:  Normal origin  No significant stenosis  Mild narrowing in the V3 segment  Levonia Saltness RIGHT EXTRACRANIAL CAROTID SEGMENT:  Mild calcified plaque at the bifurcation  No stenosis or dissection  LEFT EXTRACRANIAL CAROTID SEGMENT:  Mild calcified plaque at the bifurcation  No stenosis or dissection  NASCET criteria was used to determine the degree of internal carotid artery diameter stenosis  INTRACRANIAL VASCULATURE INTERNAL CAROTID ARTERIES:  Calcified plaque in the cavernous and supraclinoid internal carotid arteries without significant stenosis  ANTERIOR CIRCULATION:  Symmetric A1 segments and anterior cerebral arteries with normal enhancement  Normal anterior communicating artery  MIDDLE CEREBRAL ARTERY CIRCULATION:  M1 segment and middle cerebral artery branches demonstrate normal enhancement bilaterally  DISTAL VERTEBRAL ARTERIES:  Mild calcified plaque in the left V4 segment without significant stenosis  Levonia Saltness Posterior inferior cerebellar artery origins are normal  Normal vertebral basilar junction  BASILAR ARTERY:  Basilar artery is normal in caliber  Normal superior cerebellar arteries  Right superior cerebellar artery is duplicated  POSTERIOR CEREBRAL ARTERIES: There is fetal origin of the right posterior cerebral artery  The left posterior cerebral artery arises from the basilar tip  Both demonstrate no focal stenosis  Normal right P-comm  Left posterior communicating artery is  hypoplastic  VENOUS STRUCTURES:  Normal  NON VASCULAR ANATOMY BONY STRUCTURES:  No acute osseous abnormality   Osteoma arising from the from the outer table anterior wall of the right frontal sinus  Spinal degenerative changes  Degenerative changes in the bilateral temporomandibular joints  SOFT TISSUES OF THE NECK:  Unremarkable  THORACIC INLET:  Biapical scarring  Impression: No acute intracranial pathology  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  No significant stenosis of the cervical carotid or vertebral arteries  No significant intracranial stenosis, large vessel occlusion or aneurysm  Workstation performed: [id number]     XR temporomandibular joints bilateral    Result Date: 4/7/2023  Narrative: TEMPOROMANDIBULAR JOINTS INDICATION:  R68 84: Jaw pain  COMPARISON:  None VIEWS:  XR TEMPOROMANDIBULAR JOINTS BILATERAL FINDINGS: The mandibular condyles are normally located with normal anterior translation seen during open-mouth imaging  Plain film is insensitive to detect TMJ meniscal pathology and if clinically relevant, further evaluation with MRI may be obtained  There is no acute fracture or pathologic bone lesion seen  Impression: Unremarkable temporomandibular joints  Workstation performed: [id number]     XR chest 1 view portable    Result Date: 4/12/2023  Narrative: CHEST INDICATION:   acs work up  Jaw pain x3 weeks COMPARISON:  None EXAM PERFORMED/VIEWS:  XR CHEST PORTABLE FINDINGS: Cardiomediastinal silhouette appears unremarkable  The lungs are clear  Biapical pleural calcifications are seen  No pneumothorax or pleural effusion  Osseous structures appear within normal limits for patient age  Impression: No acute cardiopulmonary disease   Workstation performed: [id number]        Labs:   Admission on 04/11/2023, Discharged on 04/15/2023   Component Date Value Ref Range Status   • WBC 04/11/2023 9 78  4 31 - 10 16 Thousand/uL Final   • RBC 04/11/2023 4 69  3 81 - 5 12 Million/uL Final   • Hemoglobin 04/11/2023 13 9  11 5 - 15 4 g/dL Final   • Hematocrit 04/11/2023 43 2  34 8 - 46 1 % Final   • MCV 04/11/2023 92  82 - 98 fL Final   • MCH 04/11/2023 29 6  26 8 - 34 3 pg Final   • MCHC 04/11/2023 32 2  31 4 - 37 4 g/dL Final   • RDW 04/11/2023 12 6  11 6 - 15 1 % Final   • MPV 04/11/2023 8 9  8 9 - 12 7 fL Final   • Platelets 30/67/4868 227  149 - 390 Thousands/uL Final   • nRBC 04/11/2023 0  /100 WBCs Final   • Neutrophils Relative 04/11/2023 60  43 - 75 % Final   • Immat GRANS % 04/11/2023 0  0 - 2 % Final   • Lymphocytes Relative 04/11/2023 28  14 - 44 % Final   • Monocytes Relative 04/11/2023 9  4 - 12 % Final   • Eosinophils Relative 04/11/2023 2  0 - 6 % Final   • Basophils Relative 04/11/2023 1  0 - 1 % Final   • Neutrophils Absolute 04/11/2023 5 90  1 85 - 7 62 Thousands/µL Final   • Immature Grans Absolute 04/11/2023 0 02  0 00 - 0 20 Thousand/uL Final   • Lymphocytes Absolute 04/11/2023 2 72  0 60 - 4 47 Thousands/µL Final   • Monocytes Absolute 04/11/2023 0 91  0 17 - 1 22 Thousand/µL Final   • Eosinophils Absolute 04/11/2023 0 15  0 00 - 0 61 Thousand/µL Final   • Basophils Absolute 04/11/2023 0 08  0 00 - 0 10 Thousands/µL Final   • Sodium 04/11/2023 134 (L)  135 - 147 mmol/L Final   • Potassium 04/11/2023 3 8  3 5 - 5 3 mmol/L Final   • Chloride 04/11/2023 98  96 - 108 mmol/L Final   • CO2 04/11/2023 29  21 - 32 mmol/L Final   • ANION GAP 04/11/2023 7  4 - 13 mmol/L Final   • BUN 04/11/2023 11  5 - 25 mg/dL Final   • Creatinine 04/11/2023 0 74  0 60 - 1 30 mg/dL Final    Standardized to IDMS reference method   • Glucose 04/11/2023 93  65 - 140 mg/dL Final    If the patient is fasting, the ADA then defines impaired fasting glucose as > 100 mg/dL and diabetes as > or equal to 123 mg/dL  • Calcium 04/11/2023 9 4  8 4 - 10 2 mg/dL Final   • AST 04/11/2023 18  13 - 39 U/L Final   • ALT 04/11/2023 18  7 - 52 U/L Final    Specimen collection should occur prior to Sulfasalazine administration due to the potential for falsely depressed results      • Alkaline Phosphatase 04/11/2023 88  34 - 104 U/L Final   • Total Protein "04/11/2023 7 5  6 4 - 8 4 g/dL Final   • Albumin 04/11/2023 3 8  3 5 - 5 0 g/dL Final   • Total Bilirubin 04/11/2023 0 43  0 20 - 1 00 mg/dL Final    Use of this assay is not recommended for patients undergoing treatment with eltrombopag due to the potential for falsely elevated results  N-acetyl-p-benzoquinone imine (metabolite of Acetaminophen) will generate erroneously low results in samples for patients that have taken an overdose of Acetaminophen  • eGFR 04/11/2023 76  ml/min/1 73sq m Final   • hs TnI 0hr 04/11/2023 3  \"Refer to ACS Flowchart\"- see link ng/L Final    Comment:                                              Initial (time 0) result  If >=50 ng/L, Myocardial injury suggested ;  Type of myocardial injury and treatment strategy  to be determined  If 5-49 ng/L, a delta result at 2 hours and or 4 hours will be needed to further evaluate  If <4 ng/L, and chest pain has been >3 hours since onset, patient may qualify for discharge based on the HEART score in the ED  If <5 ng/L and <3hours since onset of chest pain, a delta result at 2 hours will be needed to further evaluate  HS Troponin 99th Percentile URL of a Health Population=12 ng/L with a 95% Confidence Interval of 8-18 ng/L  Second Troponin (time 2 hours)  If calculated delta >= 20 ng/L,  Myocardial injury suggested ; Type of myocardial injury and treatment strategy to be determined  If 5-49 ng/L and the calculated delta is 5-19 ng/L, consult medical service for evaluation  Continue evaluation for ischemia on ecg and other possible etiology and repeat hs troponin at 4 hours  If delta                            is <5 ng/L at 2 hours, consider discharge based on risk stratification via the HEART score (if in ED), or MODESTO risk score in IP/Observation  HS Troponin 99th Percentile URL of a Health Population=12 ng/L with a 95% Confidence Interval of 8-18 ng/L     • Protime 04/11/2023 13 2  11 6 - 14 5 seconds Final   • INR 04/11/2023 1 02  " "0 84 - 1 19 Final   • PTT 04/11/2023 28  23 - 37 seconds Final    Therapeutic Heparin Range =  60-90 seconds   • Sed Rate 04/11/2023 60 (H)  0 - 29 mm/hour Final   • CRP 04/11/2023 39 2 (H)  <3 0 mg/L Final   • Ventricular Rate 04/11/2023 65  BPM Final   • Atrial Rate 04/11/2023 65  BPM Final   • CO Interval 04/11/2023 164  ms Final   • QRSD Interval 04/11/2023 80  ms Final   • QT Interval 04/11/2023 398  ms Final   • QTC Interval 04/11/2023 413  ms Final   • P Axis 04/11/2023 77  degrees Final   • QRS Axis 04/11/2023 48  degrees Final   • T Wave Axis 04/11/2023 68  degrees Final   • Ventricular Rate 04/11/2023 67  BPM Final   • Atrial Rate 04/11/2023 67  BPM Final   • CO Interval 04/11/2023 164  ms Final   • QRSD Interval 04/11/2023 78  ms Final   • QT Interval 04/11/2023 410  ms Final   • QTC Interval 04/11/2023 433  ms Final   • P Axis 04/11/2023 73  degrees Final   • QRS Axis 04/11/2023 48  degrees Final   • T Wave Axis 04/11/2023 71  degrees Final   • hs TnI 2hr 04/11/2023 4  \"Refer to ACS Flowchart\"- see link ng/L Final    Comment:                                              Initial (time 0) result  If >=50 ng/L, Myocardial injury suggested ;  Type of myocardial injury and treatment strategy  to be determined  If 5-49 ng/L, a delta result at 2 hours and or 4 hours will be needed to further evaluate  If <4 ng/L, and chest pain has been >3 hours since onset, patient may qualify for discharge based on the HEART score in the ED  If <5 ng/L and <3hours since onset of chest pain, a delta result at 2 hours will be needed to further evaluate  HS Troponin 99th Percentile URL of a Health Population=12 ng/L with a 95% Confidence Interval of 8-18 ng/L  Second Troponin (time 2 hours)  If calculated delta >= 20 ng/L,  Myocardial injury suggested ; Type of myocardial injury and treatment strategy to be determined  If 5-49 ng/L and the calculated delta is 5-19 ng/L, consult medical service for evaluation    Continue " evaluation for ischemia on ecg and other possible etiology and repeat hs troponin at 4 hours  If delta                            is <5 ng/L at 2 hours, consider discharge based on risk stratification via the HEART score (if in ED), or MODESTO risk score in IP/Observation  HS Troponin 99th Percentile URL of a Health Population=12 ng/L with a 95% Confidence Interval of 8-18 ng/L  • Delta 2hr hsTnI 04/11/2023 1  <20 ng/L Final   • Sodium 04/12/2023 135  135 - 147 mmol/L Final   • Potassium 04/12/2023 4 0  3 5 - 5 3 mmol/L Final   • Chloride 04/12/2023 100  96 - 108 mmol/L Final   • CO2 04/12/2023 27  21 - 32 mmol/L Final   • ANION GAP 04/12/2023 8  4 - 13 mmol/L Final   • BUN 04/12/2023 12  5 - 25 mg/dL Final   • Creatinine 04/12/2023 0 69  0 60 - 1 30 mg/dL Final    Standardized to IDMS reference method   • Glucose 04/12/2023 157 (H)  65 - 140 mg/dL Final    If the patient is fasting, the ADA then defines impaired fasting glucose as > 100 mg/dL and diabetes as > or equal to 123 mg/dL     • Calcium 04/12/2023 9 0  8 4 - 10 2 mg/dL Final   • eGFR 04/12/2023 81  ml/min/1 73sq m Final   • WBC 04/12/2023 6 33  4 31 - 10 16 Thousand/uL Final   • RBC 04/12/2023 4 91  3 81 - 5 12 Million/uL Final   • Hemoglobin 04/12/2023 14 9  11 5 - 15 4 g/dL Final   • Hematocrit 04/12/2023 44 8  34 8 - 46 1 % Final   • MCV 04/12/2023 91  82 - 98 fL Final   • MCH 04/12/2023 30 3  26 8 - 34 3 pg Final   • MCHC 04/12/2023 33 3  31 4 - 37 4 g/dL Final   • RDW 04/12/2023 12 4  11 6 - 15 1 % Final   • MPV 04/12/2023 9 5  8 9 - 12 7 fL Final   • Platelets 36/48/3085 193  149 - 390 Thousands/uL Final   • nRBC 04/12/2023 0  /100 WBCs Final   • Neutrophils Relative 04/12/2023 88 (H)  43 - 75 % Final   • Immat GRANS % 04/12/2023 1  0 - 2 % Final   • Lymphocytes Relative 04/12/2023 10 (L)  14 - 44 % Final   • Monocytes Relative 04/12/2023 1 (L)  4 - 12 % Final   • Eosinophils Relative 04/12/2023 0  0 - 6 % Final   • Basophils Relative 04/12/2023 0 0 - 1 % Final   • Neutrophils Absolute 04/12/2023 5 64  1 85 - 7 62 Thousands/µL Final   • Immature Grans Absolute 04/12/2023 0 03  0 00 - 0 20 Thousand/uL Final   • Lymphocytes Absolute 04/12/2023 0 60  0 60 - 4 47 Thousands/µL Final   • Monocytes Absolute 04/12/2023 0 04 (L)  0 17 - 1 22 Thousand/µL Final   • Eosinophils Absolute 04/12/2023 0 00  0 00 - 0 61 Thousand/µL Final   • Basophils Absolute 04/12/2023 0 02  0 00 - 0 10 Thousands/µL Final   • WBC 04/14/2023 15 11 (H)  4 31 - 10 16 Thousand/uL Final   • RBC 04/14/2023 4 30  3 81 - 5 12 Million/uL Final   • Hemoglobin 04/14/2023 12 8  11 5 - 15 4 g/dL Final   • Hematocrit 04/14/2023 39 5  34 8 - 46 1 % Final   • MCV 04/14/2023 92  82 - 98 fL Final   • MCH 04/14/2023 29 8  26 8 - 34 3 pg Final   • MCHC 04/14/2023 32 4  31 4 - 37 4 g/dL Final   • RDW 04/14/2023 12 9  11 6 - 15 1 % Final   • MPV 04/14/2023 9 3  8 9 - 12 7 fL Final   • Platelets 92/44/4444 218  149 - 390 Thousands/uL Final   • nRBC 04/14/2023 0  /100 WBCs Final    This is an appended report  These results have been appended to a previously preliminary verified report     • Neutrophils Relative 04/14/2023 91 (H)  43 - 75 % Final   • Immat GRANS % 04/14/2023 1  0 - 2 % Final   • Lymphocytes Relative 04/14/2023 6 (L)  14 - 44 % Final   • Monocytes Relative 04/14/2023 2 (L)  4 - 12 % Final   • Eosinophils Relative 04/14/2023 0  0 - 6 % Final   • Basophils Relative 04/14/2023 0  0 - 1 % Final   • Neutrophils Absolute 04/14/2023 13 72 (H)  1 85 - 7 62 Thousands/µL Final   • Immature Grans Absolute 04/14/2023 0 07  0 00 - 0 20 Thousand/uL Final   • Lymphocytes Absolute 04/14/2023 0 94  0 60 - 4 47 Thousands/µL Final   • Monocytes Absolute 04/14/2023 0 37  0 17 - 1 22 Thousand/µL Final   • Eosinophils Absolute 04/14/2023 0 00  0 00 - 0 61 Thousand/µL Final   • Basophils Absolute 04/14/2023 0 01  0 00 - 0 10 Thousands/µL Final   • Sodium 04/14/2023 136  135 - 147 mmol/L Final   • Potassium 04/14/2023 4 4 3 5 - 5 3 mmol/L Final   • Chloride 04/14/2023 101  96 - 108 mmol/L Final   • CO2 04/14/2023 26  21 - 32 mmol/L Final   • ANION GAP 04/14/2023 9  4 - 13 mmol/L Final   • BUN 04/14/2023 23  5 - 25 mg/dL Final   • Creatinine 04/14/2023 0 73  0 60 - 1 30 mg/dL Final    Standardized to IDMS reference method   • Glucose 04/14/2023 116  65 - 140 mg/dL Final    If the patient is fasting, the ADA then defines impaired fasting glucose as > 100 mg/dL and diabetes as > or equal to 123 mg/dL  • Calcium 04/14/2023 9 0  8 4 - 10 2 mg/dL Final   • AST 04/14/2023 15  13 - 39 U/L Final   • ALT 04/14/2023 17  7 - 52 U/L Final    Specimen collection should occur prior to Sulfasalazine administration due to the potential for falsely depressed results  • Alkaline Phosphatase 04/14/2023 68  34 - 104 U/L Final   • Total Protein 04/14/2023 6 8  6 4 - 8 4 g/dL Final   • Albumin 04/14/2023 3 5  3 5 - 5 0 g/dL Final   • Total Bilirubin 04/14/2023 0 24  0 20 - 1 00 mg/dL Final    Use of this assay is not recommended for patients undergoing treatment with eltrombopag due to the potential for falsely elevated results  N-acetyl-p-benzoquinone imine (metabolite of Acetaminophen) will generate erroneously low results in samples for patients that have taken an overdose of Acetaminophen     • eGFR 04/14/2023 77  ml/min/1 73sq m Final   • Case Report 04/14/2023    Final                    Value:Surgical Pathology Report                         Case: [id number]                                   Authorizing Provider:  Karl Xie MD         Collected:           04/14/2023 1124              Ordering Location:     [facility] Received:            04/14/2023 [address] [place] [address]                                     Operating Room                                                               Pathologist:           Roxanne Valentine MD                                                     Specimen:    Artery, LEFT Temporal Artery • Final Diagnosis 04/14/2023    Final                    Value: This result contains rich text formatting which cannot be displayed here  • Note 04/14/2023    Final                    Value: This result contains rich text formatting which cannot be displayed here  • Additional Information 04/14/2023    Final                    Value: This result contains rich text formatting which cannot be displayed here  • Gross Description 04/14/2023    Final                    Value: This result contains rich text formatting which cannot be displayed here     • Clinical Information 04/14/2023    Final                    Value:LEFT Temporal Artery   • WBC 04/15/2023 10 57 (H)  4 31 - 10 16 Thousand/uL Final   • RBC 04/15/2023 4 08  3 81 - 5 12 Million/uL Final   • Hemoglobin 04/15/2023 12 1  11 5 - 15 4 g/dL Final   • Hematocrit 04/15/2023 37 7  34 8 - 46 1 % Final   • MCV 04/15/2023 92  82 - 98 fL Final   • MCH 04/15/2023 29 7  26 8 - 34 3 pg Final   • MCHC 04/15/2023 32 1  31 4 - 37 4 g/dL Final   • RDW 04/15/2023 13 1  11 6 - 15 1 % Final   • MPV 04/15/2023 9 5  8 9 - 12 7 fL Final   • Platelets 49/27/4291 201  149 - 390 Thousands/uL Final   • nRBC 04/15/2023 0  /100 WBCs Final   • Neutrophils Relative 04/15/2023 82 (H)  43 - 75 % Final   • Immat GRANS % 04/15/2023 1  0 - 2 % Final   • Lymphocytes Relative 04/15/2023 12 (L)  14 - 44 % Final   • Monocytes Relative 04/15/2023 5  4 - 12 % Final   • Eosinophils Relative 04/15/2023 0  0 - 6 % Final   • Basophils Relative 04/15/2023 0  0 - 1 % Final   • Neutrophils Absolute 04/15/2023 8 73 (H)  1 85 - 7 62 Thousands/µL Final   • Immature Grans Absolute 04/15/2023 0 07  0 00 - 0 20 Thousand/uL Final   • Lymphocytes Absolute 04/15/2023 1 22  0 60 - 4 47 Thousands/µL Final   • Monocytes Absolute 04/15/2023 0 55  0 17 - 1 22 Thousand/µL Final   • Eosinophils Absolute 04/15/2023 0 00  0 00 - 0 61 Thousand/µL Final   • Basophils Absolute 04/15/2023 0  00  0 00 - 0 10 Thousands/µL Final   • Sodium 04/15/2023 135  135 - 147 mmol/L Final   • Potassium 04/15/2023 4 2  3 5 - 5 3 mmol/L Final   • Chloride 04/15/2023 100  96 - 108 mmol/L Final   • CO2 04/15/2023 30  21 - 32 mmol/L Final   • ANION GAP 04/15/2023 5  4 - 13 mmol/L Final   • BUN 04/15/2023 22  5 - 25 mg/dL Final   • Creatinine 04/15/2023 0 72  0 60 - 1 30 mg/dL Final    Standardized to IDMS reference method   • Glucose 04/15/2023 103  65 - 140 mg/dL Final    If the patient is fasting, the ADA then defines impaired fasting glucose as > 100 mg/dL and diabetes as > or equal to 123 mg/dL  • Calcium 04/15/2023 8 5  8 4 - 10 2 mg/dL Final   • Corrected Calcium 04/15/2023 9 2  8 3 - 10 1 mg/dL Final   • AST 04/15/2023 38  13 - 39 U/L Final   • ALT 04/15/2023 46  7 - 52 U/L Final    Specimen collection should occur prior to Sulfasalazine administration due to the potential for falsely depressed results  • Alkaline Phosphatase 04/15/2023 55  34 - 104 U/L Final   • Total Protein 04/15/2023 5 8 (L)  6 4 - 8 4 g/dL Final   • Albumin 04/15/2023 3 1 (L)  3 5 - 5 0 g/dL Final   • Total Bilirubin 04/15/2023 0 25  0 20 - 1 00 mg/dL Final    Use of this assay is not recommended for patients undergoing treatment with eltrombopag due to the potential for falsely elevated results  N-acetyl-p-benzoquinone imine (metabolite of Acetaminophen) will generate erroneously low results in samples for patients that have taken an overdose of Acetaminophen     • eGFR 04/15/2023 78  ml/min/1 73sq m Final   Appointment on 04/03/2023   Component Date Value Ref Range Status   • CRP 04/03/2023 6 1 (H)  <3 0 mg/L Final   • Sed Rate 04/03/2023 23  0 - 29 mm/hour Final         Jozef Antonio PA-C  Rheumatology

## 2023-05-03 NOTE — TELEPHONE ENCOUNTER
Called patient and spoke with her regarding HFU  Patient declined to schedule  I did advise her that she can schedule an HFU up to 1 yr from her d/c date, anything after that would be a new patient appt  She verbalized understanding      Not scheduling HFU at this time

## 2023-05-19 ENCOUNTER — TELEPHONE (OUTPATIENT)
Dept: INTERNAL MEDICINE CLINIC | Facility: CLINIC | Age: 83
End: 2023-05-19

## 2023-05-19 DIAGNOSIS — R19.7 DIARRHEA, UNSPECIFIED TYPE: Primary | ICD-10-CM

## 2023-05-19 RX ORDER — DIPHENOXYLATE HYDROCHLORIDE AND ATROPINE SULFATE 2.5; .025 MG/1; MG/1
1 TABLET ORAL 4 TIMES DAILY PRN
Qty: 60 TABLET | Refills: 1 | Status: SHIPPED | OUTPATIENT
Start: 2023-05-19

## 2023-05-19 NOTE — TELEPHONE ENCOUNTER
Patient believes that the prednisone is giving her diarrhea and she also thinks she has thrush at the back of her throat  Patient said that she did gargle with 1/2 peroxide and 1/2 water last night and it did seem to help some  Patient said that she has tried Imodium for the diarrhea but nothing is working  Patient wanting to know if she should stop the prednisone? Please advise    Bharathi Dennis

## 2023-05-19 NOTE — TELEPHONE ENCOUNTER
"As per Dr Vivek Espinoza, \"Unfortunately, she cannot stop the prednisone   I will send in a prescription medicine for the diarrhea   For the thrush, ENT usually recommends gargling with baking soda versus the peroxide  \"    Spoke with the patient and advised her of this  She verbalized understanding and was thankful    "

## 2023-05-23 ENCOUNTER — OFFICE VISIT (OUTPATIENT)
Dept: INTERNAL MEDICINE CLINIC | Facility: CLINIC | Age: 83
End: 2023-05-23

## 2023-05-23 VITALS
HEIGHT: 62 IN | RESPIRATION RATE: 16 BRPM | OXYGEN SATURATION: 95 % | DIASTOLIC BLOOD PRESSURE: 80 MMHG | WEIGHT: 132.6 LBS | BODY MASS INDEX: 24.4 KG/M2 | HEART RATE: 80 BPM | SYSTOLIC BLOOD PRESSURE: 148 MMHG

## 2023-05-23 DIAGNOSIS — I10 HYPERTENSION, BENIGN: ICD-10-CM

## 2023-05-23 DIAGNOSIS — R19.7 DIARRHEA, UNSPECIFIED TYPE: ICD-10-CM

## 2023-05-23 DIAGNOSIS — B37.0 THRUSH: ICD-10-CM

## 2023-05-23 DIAGNOSIS — M31.6 TEMPORAL ARTERITIS SYNDROME (HCC): Primary | ICD-10-CM

## 2023-05-23 NOTE — PROGRESS NOTES
Assessment/Plan: Continue the prednisone taper and follow-up with rheumatology  Add nystatin for the thrush  Continue the Lomotil as needed but hopefully that will improve as the prednisone dosage is reduced  Quality Measures:       Return in about 2 months (around 7/23/2023) for Recheck  No problem-specific Assessment & Plan notes found for this encounter  Diagnoses and all orders for this visit:    Temporal arteritis syndrome (HCC)    Hypertension, benign    Diarrhea, unspecified type    Thrush  -     nystatin (MYCOSTATIN) 500,000 units/5 mL suspension; Apply 5 mL (500,000 Units total) to the mouth or throat 4 (four) times a day        Subjective:      Patient ID: Jesusita White is a 82 y o  female  Patient comes in today for follow-up  Having a lot of side effects from the prednisone but she knows she needs to stay on it  Visual improvement has stalled  She has follow-up with ophthalmology  She did see rheumatology  They have ordered further blood work for follow-up in June  The Lomotil is helping the diarrhea  But now she also has thrush  Over-the-counter not helping any longer        ALLERGIES:  Allergies   Allergen Reactions   • Sulfa Antibiotics Hives   • Latex Rash       CURRENT MEDICATIONS:    Current Outpatient Medications:   •  diphenoxylate-atropine (LOMOTIL) 2 5-0 025 mg per tablet, Take 1 tablet by mouth 4 (four) times a day as needed for diarrhea, Disp: 60 tablet, Rfl: 1  •  escitalopram (LEXAPRO) 10 mg tablet, TAKE 1 TABLET BY MOUTH  DAILY, Disp: 90 tablet, Rfl: 3  •  fexofenadine (ALLEGRA) 180 MG tablet, Take 180 mg by mouth daily, Disp: , Rfl:   •  fluticasone (FLONASE) 50 mcg/act nasal spray, SHAKE LIQUID AND USE 1 SPRAY IN EACH NOSTRIL DAILY (Patient taking differently: PRN), Disp: 48 g, Rfl: 3  •  levothyroxine 75 mcg tablet, TAKE 1 TABLET BY MOUTH  DAILY, Disp: 90 tablet, Rfl: 3  •  nystatin (MYCOSTATIN) 500,000 units/5 mL suspension, Apply 5 mL (500,000 Units total) to the mouth or throat 4 (four) times a day, Disp: 473 mL, Rfl: 1  •  omeprazole (PriLOSEC) 40 MG capsule, Take 1 capsule (40 mg total) by mouth daily, Disp: 90 capsule, Rfl: 0  •  predniSONE 10 mg tablet, Take 6 tablets (60 mg total) by mouth daily for 14 days, THEN 5 tablets (50 mg total) daily for 14 days, THEN 4 tablets (40 mg total) daily for 14 days, THEN 3 tablets (30 mg total) daily for 14 days, THEN 2 tablets (20 mg total) daily for 14 days, THEN 1 tablet (10 mg total) daily for 14 days  , Disp: 294 tablet, Rfl: 0  •  lisinopril (ZESTRIL) 5 mg tablet, TAKE 1 TABLET BY MOUTH  DAILY, Disp: 90 tablet, Rfl: 3  •  Lumigan 0 01 % ophthalmic drops, , Disp: , Rfl:     ACTIVE PROBLEM LIST:  Patient Active Problem List   Diagnosis   • Allergic rhinitis   • Hyperlipidemia   • Hypertension, benign   • Hypothyroidism   • Vitamin D deficiency   • Depression   • Current moderate episode of major depressive disorder without prior episode (HCC)   • Polyarthritis with positive rheumatoid factor (HCC)   • Visual disturbance   • Amaurosis fugax of left eye   • Temporal arteritis syndrome (HCC)       PAST MEDICAL HISTORY:  Past Medical History:   Diagnosis Date   • Depression 12/30/2021   • Lumbar stenosis    • Transient global amnesia        PAST SURGICAL HISTORY:  Past Surgical History:   Procedure Laterality Date   • CATARACT EXTRACTION     • FL LIGATION/BIOPSY TEMPORAL ARTERY Left 4/14/2023    Procedure: BIOPSY ARTERY TEMPORAL;  Surgeon: Radha Eaton MD;  Location: [facility];  Service: General       FAMILY HISTORY:  Family History   Problem Relation Age of Onset   • Stroke Mother 61        CVA   • Pancreatic cancer Father    • Hyperlipidemia Sister        SOCIAL HISTORY:  Social History     Socioeconomic History   • Marital status: /Civil Union     Spouse name: Not on file   • Number of children: 3   • Years of education: Not on file   • Highest education level: Not on file   Occupational History   • Occupation:   "[employer]     Comment: Full time employment   Tobacco Use   • Smoking status: Former   • Smokeless tobacco: Never   Vaping Use   • Vaping Use: Never used   Substance and Sexual Activity   • Alcohol use: Yes     Comment: glass white wine with dinner   • Drug use: No   • Sexual activity: Not Currently     Partners: Male   Other Topics Concern   • Not on file   Social History Narrative   • Not on file     Social Determinants of Health     Financial Resource Strain: Not on file   Food Insecurity: Not on file   Transportation Needs: Not on file   Physical Activity: Not on file   Stress: Not on file   Social Connections: Not on file   Intimate Partner Violence: Not on file   Housing Stability: Not on file       Review of Systems   Respiratory: Negative for shortness of breath  Cardiovascular: Negative for chest pain  Gastrointestinal: Negative for abdominal pain  Objective:  Vitals:    05/23/23 1518   BP: 148/80   BP Location: Left arm   Patient Position: Sitting   Cuff Size: Adult   Pulse: 80   Resp: 16   SpO2: 95%   Weight: 60 1 kg (132 lb 9 6 oz)   Height: 5' 2\" (1 575 m)     Body mass index is 24 25 kg/m²  Physical Exam  Vitals and nursing note reviewed  Constitutional:       Appearance: She is well-developed  HENT:      Mouth/Throat:      Pharynx: Oropharyngeal exudate (White exudate consistent with thrush) present  Neurological:      Mental Status: She is alert  RESULTS:    In chart    This note was created with voice recognition software  Phonic, grammatical and spelling errors may be present within the note as a result      "

## 2023-06-14 ENCOUNTER — TELEPHONE (OUTPATIENT)
Dept: INTERNAL MEDICINE CLINIC | Facility: CLINIC | Age: 83
End: 2023-06-14

## 2023-06-14 DIAGNOSIS — F19.982 DRUG-INDUCED INSOMNIA (HCC): Primary | ICD-10-CM

## 2023-06-14 NOTE — TELEPHONE ENCOUNTER
On the first week of Prednisone 10 mg x 2 a day  Patient weaned herself off the Lexapro, patient didn't feel it helped her at all  Please advise if you are ok with this and melissa with patient

## 2023-06-14 NOTE — TELEPHONE ENCOUNTER
"As per Dr Yakov Walls, \"That is fine, she can see how things go as the prednisone continues to be tapered down  \"    Patient would like to know if there is anything you recommend she takes for insomnia? She has trouble falling asleep and staying asleep  She feels as if her days and nights are mixed up  She is asking for recommendations on anything that'll help her get a good nights sleep    "

## 2023-06-14 NOTE — TELEPHONE ENCOUNTER
Pt has tried melatonin in the past and it did not work for her  Is there something else you could recommend?

## 2023-06-15 RX ORDER — TRAZODONE HYDROCHLORIDE 50 MG/1
50 TABLET ORAL
Qty: 30 TABLET | Refills: 5 | Status: SHIPPED | OUTPATIENT
Start: 2023-06-15

## 2023-07-06 ENCOUNTER — TELEPHONE (OUTPATIENT)
Dept: INTERNAL MEDICINE CLINIC | Facility: CLINIC | Age: 83
End: 2023-07-06

## 2023-07-06 DIAGNOSIS — E03.9 ACQUIRED HYPOTHYROIDISM: Primary | ICD-10-CM

## 2023-07-06 DIAGNOSIS — E78.2 MIXED HYPERLIPIDEMIA: ICD-10-CM

## 2023-07-06 NOTE — TELEPHONE ENCOUNTER
Patient called - she is asking if there are any labs needed by Dr. Gabriela Sterling other than those ordered by her rheumatologist so she can do them at the same time. She would like a call back today.

## 2023-07-07 ENCOUNTER — APPOINTMENT (OUTPATIENT)
Age: 83
End: 2023-07-07
Payer: MEDICARE

## 2023-07-07 DIAGNOSIS — M05.80 POLYARTHRITIS WITH POSITIVE RHEUMATOID FACTOR (HCC): ICD-10-CM

## 2023-07-07 DIAGNOSIS — M31.6 TEMPORAL ARTERITIS SYNDROME (HCC): ICD-10-CM

## 2023-07-07 DIAGNOSIS — E03.9 ACQUIRED HYPOTHYROIDISM: ICD-10-CM

## 2023-07-07 DIAGNOSIS — Z79.52 CURRENT USE OF STEROID MEDICATION: ICD-10-CM

## 2023-07-07 DIAGNOSIS — R79.82 ELEVATED C-REACTIVE PROTEIN (CRP): ICD-10-CM

## 2023-07-07 DIAGNOSIS — R70.0 ELEVATED SED RATE (ELEV SR): ICD-10-CM

## 2023-07-07 DIAGNOSIS — E78.2 MIXED HYPERLIPIDEMIA: ICD-10-CM

## 2023-07-07 LAB
ALBUMIN SERPL BCP-MCNC: 3.3 G/DL (ref 3.5–5)
ALP SERPL-CCNC: 57 U/L (ref 46–116)
ALT SERPL W P-5'-P-CCNC: 36 U/L (ref 12–78)
ANION GAP SERPL CALCULATED.3IONS-SCNC: 4 MMOL/L
ANISOCYTOSIS BLD QL SMEAR: PRESENT
AST SERPL W P-5'-P-CCNC: 23 U/L (ref 5–45)
BASOPHILS # BLD MANUAL: 0.11 THOUSAND/UL (ref 0–0.1)
BASOPHILS NFR MAR MANUAL: 1 % (ref 0–1)
BILIRUB SERPL-MCNC: 0.58 MG/DL (ref 0.2–1)
BUN SERPL-MCNC: 17 MG/DL (ref 5–25)
CALCIUM ALBUM COR SERPL-MCNC: 9.7 MG/DL (ref 8.3–10.1)
CALCIUM SERPL-MCNC: 9.1 MG/DL (ref 8.3–10.1)
CHLORIDE SERPL-SCNC: 106 MMOL/L (ref 96–108)
CHOLEST SERPL-MCNC: 231 MG/DL
CO2 SERPL-SCNC: 30 MMOL/L (ref 21–32)
CREAT SERPL-MCNC: 0.94 MG/DL (ref 0.6–1.3)
CRP SERPL QL: 4.2 MG/L
EOSINOPHIL # BLD MANUAL: 0 THOUSAND/UL (ref 0–0.4)
EOSINOPHIL NFR BLD MANUAL: 0 % (ref 0–6)
ERYTHROCYTE [DISTWIDTH] IN BLOOD BY AUTOMATED COUNT: 15.5 % (ref 11.6–15.1)
ERYTHROCYTE [SEDIMENTATION RATE] IN BLOOD: 12 MM/HOUR (ref 0–29)
GFR SERPL CREATININE-BSD FRML MDRD: 57 ML/MIN/1.73SQ M
GLUCOSE P FAST SERPL-MCNC: 76 MG/DL (ref 65–99)
HCT VFR BLD AUTO: 45.9 % (ref 34.8–46.1)
HDLC SERPL-MCNC: 57 MG/DL
HGB BLD-MCNC: 15 G/DL (ref 11.5–15.4)
LDLC SERPL CALC-MCNC: 147 MG/DL (ref 0–100)
LYMPHOCYTES # BLD AUTO: 3.55 THOUSAND/UL (ref 0.6–4.47)
LYMPHOCYTES # BLD AUTO: 32 % (ref 14–44)
MCH RBC QN AUTO: 30.6 PG (ref 26.8–34.3)
MCHC RBC AUTO-ENTMCNC: 32.7 G/DL (ref 31.4–37.4)
MCV RBC AUTO: 94 FL (ref 82–98)
MONOCYTES # BLD AUTO: 0.56 THOUSAND/UL (ref 0–1.22)
MONOCYTES NFR BLD: 5 % (ref 4–12)
NEUTROPHILS # BLD MANUAL: 6.88 THOUSAND/UL (ref 1.85–7.62)
NEUTS BAND NFR BLD MANUAL: 3 % (ref 0–8)
NEUTS SEG NFR BLD AUTO: 59 % (ref 43–75)
NONHDLC SERPL-MCNC: 174 MG/DL
PLATELET # BLD AUTO: 229 THOUSANDS/UL (ref 149–390)
PLATELET BLD QL SMEAR: ADEQUATE
PMV BLD AUTO: 9.6 FL (ref 8.9–12.7)
POLYCHROMASIA BLD QL SMEAR: PRESENT
POTASSIUM SERPL-SCNC: 4.3 MMOL/L (ref 3.5–5.3)
PROT SERPL-MCNC: 6.4 G/DL (ref 6.4–8.4)
RBC # BLD AUTO: 4.9 MILLION/UL (ref 3.81–5.12)
RBC MORPH BLD: PRESENT
SMUDGE CELLS BLD QL SMEAR: PRESENT
SODIUM SERPL-SCNC: 140 MMOL/L (ref 135–147)
T4 FREE SERPL-MCNC: 1.13 NG/DL (ref 0.61–1.12)
TRIGL SERPL-MCNC: 135 MG/DL
TSH SERPL DL<=0.05 MIU/L-ACNC: 2.24 UIU/ML (ref 0.45–4.5)
WBC # BLD AUTO: 11.1 THOUSAND/UL (ref 4.31–10.16)
WBC TOXIC VACUOLES BLD QL SMEAR: PRESENT

## 2023-07-07 PROCEDURE — 84439 ASSAY OF FREE THYROXINE: CPT

## 2023-07-07 PROCEDURE — 80061 LIPID PANEL: CPT

## 2023-07-07 PROCEDURE — 85007 BL SMEAR W/DIFF WBC COUNT: CPT

## 2023-07-07 PROCEDURE — 85652 RBC SED RATE AUTOMATED: CPT

## 2023-07-07 PROCEDURE — 85027 COMPLETE CBC AUTOMATED: CPT

## 2023-07-07 PROCEDURE — 84443 ASSAY THYROID STIM HORMONE: CPT

## 2023-07-07 PROCEDURE — 80053 COMPREHEN METABOLIC PANEL: CPT

## 2023-07-07 PROCEDURE — 36415 COLL VENOUS BLD VENIPUNCTURE: CPT

## 2023-07-07 PROCEDURE — 86140 C-REACTIVE PROTEIN: CPT

## 2023-07-10 ENCOUNTER — OFFICE VISIT (OUTPATIENT)
Age: 83
End: 2023-07-10
Payer: MEDICARE

## 2023-07-10 ENCOUNTER — TELEPHONE (OUTPATIENT)
Dept: RHEUMATOLOGY | Facility: CLINIC | Age: 83
End: 2023-07-10

## 2023-07-10 ENCOUNTER — TELEPHONE (OUTPATIENT)
Dept: INTERNAL MEDICINE CLINIC | Facility: CLINIC | Age: 83
End: 2023-07-10

## 2023-07-10 VITALS
HEIGHT: 62 IN | SYSTOLIC BLOOD PRESSURE: 120 MMHG | HEART RATE: 85 BPM | OXYGEN SATURATION: 100 % | BODY MASS INDEX: 24.84 KG/M2 | WEIGHT: 135 LBS | RESPIRATION RATE: 18 BRPM | TEMPERATURE: 99.2 F | DIASTOLIC BLOOD PRESSURE: 80 MMHG

## 2023-07-10 DIAGNOSIS — Z79.52 CURRENT USE OF STEROID MEDICATION: ICD-10-CM

## 2023-07-10 DIAGNOSIS — M05.80 POLYARTHRITIS WITH POSITIVE RHEUMATOID FACTOR (HCC): ICD-10-CM

## 2023-07-10 DIAGNOSIS — G45.3 AMAUROSIS FUGAX OF LEFT EYE: ICD-10-CM

## 2023-07-10 DIAGNOSIS — M31.6 TEMPORAL ARTERITIS SYNDROME (HCC): Primary | ICD-10-CM

## 2023-07-10 DIAGNOSIS — T78.40XA ALLERGIC REACTION, INITIAL ENCOUNTER: Primary | ICD-10-CM

## 2023-07-10 DIAGNOSIS — M31.6 TEMPORAL ARTERITIS SYNDROME (HCC): ICD-10-CM

## 2023-07-10 DIAGNOSIS — R79.82 ELEVATED C-REACTIVE PROTEIN (CRP): ICD-10-CM

## 2023-07-10 DIAGNOSIS — R70.0 ELEVATED SED RATE (ELEV SR): ICD-10-CM

## 2023-07-10 PROCEDURE — G0463 HOSPITAL OUTPT CLINIC VISIT: HCPCS | Performed by: PHYSICIAN ASSISTANT

## 2023-07-10 PROCEDURE — 99213 OFFICE O/P EST LOW 20 MIN: CPT | Performed by: PHYSICIAN ASSISTANT

## 2023-07-10 RX ORDER — HYDROXYCHLOROQUINE SULFATE 200 MG/1
300 TABLET, FILM COATED ORAL
Qty: 45 TABLET | Refills: 3 | Status: SHIPPED | OUTPATIENT
Start: 2023-07-10 | End: 2023-07-10

## 2023-07-10 RX ORDER — HYDROXYCHLOROQUINE SULFATE 200 MG/1
300 TABLET, FILM COATED ORAL
Qty: 135 TABLET | Refills: 0 | Status: SHIPPED | OUTPATIENT
Start: 2023-07-10 | End: 2023-10-08

## 2023-07-10 RX ORDER — FAMOTIDINE 20 MG/1
20 TABLET, FILM COATED ORAL DAILY
Qty: 20 TABLET | Refills: 0 | Status: SHIPPED | OUTPATIENT
Start: 2023-07-10 | End: 2023-07-17

## 2023-07-10 RX ORDER — DIPHENHYDRAMINE HCL 25 MG
25 TABLET ORAL
Qty: 30 TABLET | Refills: 0
Start: 2023-07-10 | End: 2023-07-17

## 2023-07-10 RX ORDER — PREDNISONE 5 MG/1
5 TABLET ORAL DAILY
Qty: 30 TABLET | Refills: 3 | Status: SHIPPED | OUTPATIENT
Start: 2023-07-10 | End: 2023-08-09

## 2023-07-10 NOTE — TELEPHONE ENCOUNTER
Spoke with patient and she is agreeable to doing HCQ and low dose of prednisone. Can you please send RX to pharmacy. Also she reported breaking out on a rash on her face and all over her body she has a message into her PCP to see what they think it may be, I did ask her not to start any new medication until everything has cleared up.

## 2023-07-10 NOTE — TELEPHONE ENCOUNTER
Pt has a rash she says from head to toe?      wasn't outisde at all she says     Used a liquid IV for hydration is the only think diferent she says.      Asks to ck her lab results too     Just weened off prednizone she states also      Please advise  [phone]

## 2023-07-10 NOTE — TELEPHONE ENCOUNTER
----- Message from Helen Saavedra PA-C sent at 7/10/2023  8:45 AM EDT -----  Please let the patient know that her inflammation markers are much improved. I believe her steroid taper is probably done as of this wk. I saw she had side effects from the prednisone -- but I would like her to take prednisone 5 mg daily for now while we initiate steroid-sparing medication, hydroxychloroquine which she would take 1.5 tabs daily with food. Soto Coleman and then this will let us wean her off of the steroids completely. Let me know if she's agreeable and I will send Rx's.

## 2023-07-10 NOTE — PROGRESS NOTES
[facility]        NAME: Jordan Marley is a 82 y.o. female  : 1940    MRN: [id number]  DATE: July 10, 2023  TIME: 5:45 PM    Assessment and Plan   Allergic reaction, initial encounter [T78.40XA]  1. Allergic reaction, initial encounter  famotidine (PEPCID) 20 mg tablet    diphenhydrAMINE (BENADRYL) 25 mg tablet            Patient Instructions     MDM:   I suspect an allergic reaction to electrolyte drink opposed to recently coming off of prednisone long term regimen? Pepcid 20 mg 1 tablet once daily may increase to twice daily as needed. Use your Allegra which you have once daily during the daytime. Benadryl 25 mg at bedtime as needed. Follow up with PCP and rheumatologist in 2-3 days. Proceed to  ER if symptoms worsen. Chief Complaint     Chief Complaint   Patient presents with   • Rash     Rash all over body, patient has been on a steroid med that she stopped last Tuesday. She doesn't know if this is a reaction to stopping the med or due to starting a new hydration powder she's been trying. Patient realized her skin is much more sensitive lately, bleeding and skin opening up. Her primary was called and she was put on schedule for next week. Urgent care was recommended for today. History of Present Illness       77-year-old female is presenting today with a pruritic rash throughout her entire body from face to lower extremities in the last 48 to 72 hours. The patient has been on long-term prednisone for temporal arteritis flareup. Last dose was 72 hours ago at 10 mg grams. She denies allergies towards prednisone and has had it before without similar presentation. Subsequently, the patient also had tried a electrolyte solution to hydrate which she is unsure is causing the reaction. Patient reported having a similar drink several years ago where she had developed a reaction as such.   Patient denies pain, shortness of breath, wheezing, sore throat, scratchy throat, itchy throat, watery eyes, tachypnea, dyspnea or cough. Review of Systems   Review of Systems   Constitutional: Negative for activity change, appetite change, chills and fever. Respiratory: Negative for choking, chest tightness and wheezing. Cardiovascular: Negative for chest pain and palpitations. Gastrointestinal: Negative for abdominal pain, diarrhea, nausea and vomiting. Skin: Positive for rash. Neurological: Negative for dizziness, weakness, light-headedness, numbness and headaches.          Current Medications       Current Outpatient Medications:   •  diphenhydrAMINE (BENADRYL) 25 mg tablet, Take 1 tablet (25 mg total) by mouth daily at bedtime as needed for itching for up to 7 days, Disp: 30 tablet, Rfl: 0  •  famotidine (PEPCID) 20 mg tablet, Take 1 tablet (20 mg total) by mouth daily for 7 days, Disp: 20 tablet, Rfl: 0  •  diphenoxylate-atropine (LOMOTIL) 2.5-0.025 mg per tablet, Take 1 tablet by mouth 4 (four) times a day as needed for diarrhea, Disp: 60 tablet, Rfl: 1  •  fexofenadine (ALLEGRA) 180 MG tablet, Take 180 mg by mouth daily, Disp: , Rfl:   •  fluticasone (FLONASE) 50 mcg/act nasal spray, SHAKE LIQUID AND USE 1 SPRAY IN EACH NOSTRIL DAILY (Patient taking differently: PRN), Disp: 48 g, Rfl: 3  •  hydroxychloroquine (PLAQUENIL) 200 mg tablet, Take 1.5 tablets (300 mg total) by mouth daily with breakfast, Disp: 135 tablet, Rfl: 0  •  levothyroxine 75 mcg tablet, TAKE 1 TABLET BY MOUTH  DAILY, Disp: 90 tablet, Rfl: 3  •  lisinopril (ZESTRIL) 5 mg tablet, TAKE 1 TABLET BY MOUTH  DAILY, Disp: 90 tablet, Rfl: 3  •  Lumigan 0.01 % ophthalmic drops, , Disp: , Rfl:   •  nystatin (MYCOSTATIN) 500,000 units/5 mL suspension, Apply 5 mL (500,000 Units total) to the mouth or throat 4 (four) times a day, Disp: 473 mL, Rfl: 1  •  omeprazole (PriLOSEC) 40 MG capsule, Take 1 capsule (40 mg total) by mouth daily, Disp: 90 capsule, Rfl: 0  •  predniSONE 5 mg tablet, Take 1 tablet (5 mg total) by mouth daily, Disp: 30 tablet, Rfl: 3  •  traZODone (DESYREL) 50 mg tablet, Take 1 tablet (50 mg total) by mouth daily at bedtime, Disp: 30 tablet, Rfl: 5    Current Allergies     Allergies as of 07/10/2023 - Reviewed 07/10/2023   Allergen Reaction Noted   • Sulfa antibiotics Hives 12/03/2014   • Latex Rash 04/14/2023            The following portions of the patient's history were reviewed and updated as appropriate: allergies, current medications, past family history, past medical history, past social history, past surgical history and problem list.     Past Medical History:   Diagnosis Date   • Depression 12/30/2021   • Lumbar stenosis    • Transient global amnesia        Past Surgical History:   Procedure Laterality Date   • CATARACT EXTRACTION     • OR LIGATION/BIOPSY TEMPORAL ARTERY Left 4/14/2023    Procedure: BIOPSY ARTERY TEMPORAL;  Surgeon: Colin Justin MD;  Location: [facility];  Service: General       Family History   Problem Relation Age of Onset   • Stroke Mother 61        CVA   • Pancreatic cancer Father    • Hyperlipidemia Sister          Medications have been verified. Objective   /80   Pulse 85   Temp 99.2 °F (37.3 °C)   Resp 18   Ht 5' 2" (1.575 m)   Wt 61.2 kg (135 lb)   SpO2 100%   BMI 24.69 kg/m²        Physical Exam     Physical Exam  Vitals and nursing note reviewed. Constitutional:       General: She is not in acute distress. Appearance: Normal appearance. She is not ill-appearing. HENT:      Nose: Nose normal.      Mouth/Throat:      Mouth: Mucous membranes are moist.      Pharynx: Oropharynx is clear. Eyes:      Extraocular Movements: Extraocular movements intact. Conjunctiva/sclera: Conjunctivae normal.      Pupils: Pupils are equal, round, and reactive to light. Cardiovascular:      Rate and Rhythm: Normal rate and regular rhythm. Pulses: Normal pulses. Pulmonary:      Effort: Pulmonary effort is normal. No respiratory distress.       Breath sounds: Normal breath sounds. Musculoskeletal:         General: Normal range of motion. Cervical back: Normal range of motion and neck supple. No tenderness. Lymphadenopathy:      Cervical: No cervical adenopathy. Skin:     Comments: Maculopapular rash developing throughout the entire body from head to toe. Rashes nontender however blanches on depression. Lesions are variable in sizes. Neurological:      General: No focal deficit present. Mental Status: She is alert and oriented to person, place, and time. Coordination: Coordination normal.      Gait: Gait normal.   Psychiatric:         Mood and Affect: Mood normal.         Behavior: Behavior normal.         Thought Content:  Thought content normal.         Judgment: Judgment normal.

## 2023-07-10 NOTE — TELEPHONE ENCOUNTER
She did callback with a flushed face and there wasn't an update yet so far,     She didn't want to wait so shes going to try urgent care or e/r for now

## 2023-07-10 NOTE — RESULT ENCOUNTER NOTE
Please let the patient know that her inflammation markers are much improved. I believe her steroid taper is probably done as of this wk. I saw she had side effects from the prednisone -- but I would like her to take prednisone 5 mg daily for now while we initiate steroid-sparing medication, hydroxychloroquine which she would take 1.5 tabs daily with food. Douglas Shell and then this will let us wean her off of the steroids completely. Let me know if she's agreeable and I will send Rx's.

## 2023-07-11 ENCOUNTER — TELEPHONE (OUTPATIENT)
Dept: INTERNAL MEDICINE CLINIC | Facility: CLINIC | Age: 83
End: 2023-07-11

## 2023-07-11 NOTE — TELEPHONE ENCOUNTER
Patient saw [facility] in [facility] yesterday for a rash that cover's her whole body. The doctor there said that patient needs to be seen ASAP. If you could review and call patient back. Patient is very concerned about starting a new medication from one of her other doctor's with not knowing what is causing this rash. Please advise. ..... Eulalio Moss

## 2023-07-14 ENCOUNTER — OFFICE VISIT (OUTPATIENT)
Dept: INTERNAL MEDICINE CLINIC | Facility: CLINIC | Age: 83
End: 2023-07-14
Payer: MEDICARE

## 2023-07-14 VITALS
WEIGHT: 135 LBS | SYSTOLIC BLOOD PRESSURE: 132 MMHG | RESPIRATION RATE: 16 BRPM | BODY MASS INDEX: 24.84 KG/M2 | TEMPERATURE: 98.1 F | HEART RATE: 93 BPM | DIASTOLIC BLOOD PRESSURE: 76 MMHG | OXYGEN SATURATION: 96 % | HEIGHT: 62 IN

## 2023-07-14 DIAGNOSIS — M31.6 TEMPORAL ARTERITIS SYNDROME (HCC): ICD-10-CM

## 2023-07-14 DIAGNOSIS — M05.80 POLYARTHRITIS WITH POSITIVE RHEUMATOID FACTOR (HCC): ICD-10-CM

## 2023-07-14 DIAGNOSIS — E24.2 CUSHINGOID SIDE EFFECT OF STEROIDS (HCC): Primary | ICD-10-CM

## 2023-07-14 PROCEDURE — 99213 OFFICE O/P EST LOW 20 MIN: CPT | Performed by: PHYSICIAN ASSISTANT

## 2023-07-14 NOTE — PATIENT INSTRUCTIONS
Continue Allegra. Continue famotidine (Pepcid) 1 in the morning on an empty stomach and 1 at bedtime. Continue your prednisone 5 mg daily and your hydroxychloroquine. Give a status call next week as to how you are doing. Stop the supplement you are taking. Of the leg swelling and facial swelling is a side effect of prolonged use of heavy doses of prednisone. This should gradually improve.

## 2023-07-14 NOTE — PROGRESS NOTES
Assessment/Plan:   Patient Instructions   Continue Allegra. Continue famotidine (Pepcid) 1 in the morning on an empty stomach and 1 at bedtime. Continue your prednisone 5 mg daily and your hydroxychloroquine. Give a status call next week as to how you are doing. Stop the supplement you are taking. Of the leg swelling and facial swelling is a side effect of prolonged use of heavy doses of prednisone. This should gradually improve. Quality Measures:       Return for Next scheduled follow up. Diagnoses and all orders for this visit:    Cushingoid side effect of steroids (HCC)    Polyarthritis with positive rheumatoid factor (HCC)    Temporal arteritis syndrome (HCC)          Subjective:      Patient ID: Nirali Sims is a 82 y.o. female. Acute visit    Patient is stating that for the past week after she was using a supplemental energy drink she began developing a generalized body rash from her head to her toes. Admits it was itchy. At or near the same time she noted her legs were swelling and her face has been swollen. She had sought consultation at urgent care who advised use of Benadryl and H2 blocker of famotidine. She stopped the energy drink, she also had been placed on 5 mg of prednisone plus hydroxychloroquine from her rheumatologist.  Since then she has noticed that the rash is starting to quiet down, and she has less leg swelling. Her legs however broke out into a significant rash and redness which is greatly concerning to her.       ALLERGIES:  Allergies   Allergen Reactions   • Sulfa Antibiotics Hives   • Latex Rash       CURRENT MEDICATIONS:    Current Outpatient Medications:   •  diphenhydrAMINE (BENADRYL) 25 mg tablet, Take 1 tablet (25 mg total) by mouth daily at bedtime as needed for itching for up to 7 days, Disp: 30 tablet, Rfl: 0  •  diphenoxylate-atropine (LOMOTIL) 2.5-0.025 mg per tablet, Take 1 tablet by mouth 4 (four) times a day as needed for diarrhea, Disp: 60 tablet, Rfl: 1  •  famotidine (PEPCID) 20 mg tablet, Take 1 tablet (20 mg total) by mouth daily for 7 days, Disp: 20 tablet, Rfl: 0  •  fexofenadine (ALLEGRA) 180 MG tablet, Take 180 mg by mouth daily, Disp: , Rfl:   •  fluticasone (FLONASE) 50 mcg/act nasal spray, SHAKE LIQUID AND USE 1 SPRAY IN EACH NOSTRIL DAILY (Patient taking differently: PRN), Disp: 48 g, Rfl: 3  •  hydroxychloroquine (PLAQUENIL) 200 mg tablet, Take 1.5 tablets (300 mg total) by mouth daily with breakfast, Disp: 135 tablet, Rfl: 0  •  levothyroxine 75 mcg tablet, TAKE 1 TABLET BY MOUTH  DAILY, Disp: 90 tablet, Rfl: 3  •  lisinopril (ZESTRIL) 5 mg tablet, TAKE 1 TABLET BY MOUTH  DAILY, Disp: 90 tablet, Rfl: 3  •  nystatin (MYCOSTATIN) 500,000 units/5 mL suspension, Apply 5 mL (500,000 Units total) to the mouth or throat 4 (four) times a day, Disp: 473 mL, Rfl: 1  •  omeprazole (PriLOSEC) 40 MG capsule, Take 1 capsule (40 mg total) by mouth daily, Disp: 90 capsule, Rfl: 0  •  predniSONE 5 mg tablet, Take 1 tablet (5 mg total) by mouth daily, Disp: 30 tablet, Rfl: 3  •  traZODone (DESYREL) 50 mg tablet, Take 1 tablet (50 mg total) by mouth daily at bedtime, Disp: 30 tablet, Rfl: 5  •  Lumigan 0.01 % ophthalmic drops, , Disp: , Rfl:     ACTIVE PROBLEM LIST:  Patient Active Problem List   Diagnosis   • Allergic rhinitis   • Hyperlipidemia   • Hypertension, benign   • Hypothyroidism   • Vitamin D deficiency   • Depression   • Current moderate episode of major depressive disorder without prior episode (HCC)   • Polyarthritis with positive rheumatoid factor (HCC)   • Visual disturbance   • Amaurosis fugax of left eye   • Temporal arteritis syndrome (HCC)       PAST MEDICAL HISTORY:  Past Medical History:   Diagnosis Date   • Depression 12/30/2021   • Lumbar stenosis    • Transient global amnesia        PAST SURGICAL HISTORY:  Past Surgical History:   Procedure Laterality Date   • CATARACT EXTRACTION     • OR LIGATION/BIOPSY TEMPORAL ARTERY Left 4/14/2023    Procedure: BIOPSY ARTERY TEMPORAL;  Surgeon: David Luke MD;  Location: [facility];  Service: General       FAMILY HISTORY:  Family History   Problem Relation Age of Onset   • Stroke Mother 61        CVA   • Pancreatic cancer Father    • Hyperlipidemia Sister        SOCIAL HISTORY:  Social History     Socioeconomic History   • Marital status: /Civil Union     Spouse name: Not on file   • Number of children: 3   • Years of education: Not on file   • Highest education level: Not on file   Occupational History   • Occupation: [employer]     Comment: Full time employment   Tobacco Use   • Smoking status: Former   • Smokeless tobacco: Never   Vaping Use   • Vaping Use: Never used   Substance and Sexual Activity   • Alcohol use: Yes     Comment: glass white wine with dinner   • Drug use: No   • Sexual activity: Not Currently     Partners: Male   Other Topics Concern   • Not on file   Social History Narrative   • Not on file     Social Determinants of Health     Financial Resource Strain: Not on file   Food Insecurity: Not on file   Transportation Needs: Not on file   Physical Activity: Not on file   Stress: Not on file   Social Connections: Not on file   Intimate Partner Violence: Not on file   Housing Stability: Not on file       Review of Systems   Constitutional: Negative for activity change, chills, fatigue and fever. HENT: Negative for congestion. Eyes: Negative for discharge. Respiratory: Negative for cough, chest tightness and shortness of breath. Cardiovascular: Negative for chest pain, palpitations and leg swelling. Gastrointestinal: Negative for abdominal pain. Endocrine: Negative for polydipsia, polyphagia and polyuria. Genitourinary: Negative for difficulty urinating. Musculoskeletal: Negative for arthralgias and myalgias. Skin: Positive for rash. Allergic/Immunologic: Negative for immunocompromised state.    Neurological: Negative for dizziness, syncope, weakness, light-headedness and headaches. Hematological: Negative for adenopathy. Does not bruise/bleed easily. Psychiatric/Behavioral: Negative for dysphoric mood. The patient is not nervous/anxious. Objective:  Vitals:    07/14/23 1009   BP: 132/76   BP Location: Left arm   Patient Position: Sitting   Cuff Size: Adult   Pulse: 93   Resp: 16   Temp: 98.1 °F (36.7 °C)   TempSrc: Tympanic   SpO2: 96%   Weight: 61.2 kg (135 lb)   Height: 5' 2" (1.575 m)     Body mass index is 24.69 kg/m². Physical Exam  Vitals and nursing note reviewed. Constitutional:       General: She is not in acute distress. Appearance: She is well-developed. Comments: Cushingoid facies   HENT:      Head: Normocephalic and atraumatic. Eyes:      Extraocular Movements: Extraocular movements intact. Pupils: Pupils are equal, round, and reactive to light. Neck:      Thyroid: No thyromegaly. Vascular: No carotid bruit or JVD. Pulmonary:      Effort: Pulmonary effort is normal. No respiratory distress. Musculoskeletal:      Cervical back: Neck supple. Right lower leg: Edema present. Left lower leg: Edema present. Lymphadenopathy:      Cervical: No cervical adenopathy. Skin:     General: Skin is warm and dry. Findings: No rash. Comments: Generalized slightly pink macular papular slightly raised rash on trunk, arms, legs. Petechial eruption on pretibial regions bilaterally. 1++ edema of right lower leg, 1+ edema left lower leg. Neurological:      General: No focal deficit present. Mental Status: She is alert and oriented to person, place, and time. Mental status is at baseline. Psychiatric:         Mood and Affect: Mood normal.         Behavior: Behavior normal.           RESULTS:    In chart    This note was created with voice recognition software. Phonic, grammatical and spelling errors may be present within the note as a result.

## 2023-07-19 ENCOUNTER — TELEPHONE (OUTPATIENT)
Dept: INTERNAL MEDICINE CLINIC | Facility: CLINIC | Age: 83
End: 2023-07-19

## 2023-07-19 ENCOUNTER — RA CDI HCC (OUTPATIENT)
Dept: OTHER | Facility: HOSPITAL | Age: 83
End: 2023-07-19

## 2023-07-19 NOTE — PROGRESS NOTES
[facility] coding opportunities       Chart reviewed, no opportunity found: CHART REVIEWED, NO OPPORTUNITY FOUND        Patients Insurance     Medicare Insurance: Medicare

## 2023-07-19 NOTE — TELEPHONE ENCOUNTER
Patient is still having redness and itching. The rash is lighter but still on her legs and arms. She is wondering if she could be allergic to the hydroxychloroquine? (This is from her rheumatologist) She did get allegra and is going to try the allegra tonight before bed. Please advise. ..     Call back #[phone]

## 2023-07-24 ENCOUNTER — TELEPHONE (OUTPATIENT)
Dept: OBGYN CLINIC | Facility: CLINIC | Age: 83
End: 2023-07-24

## 2023-07-24 DIAGNOSIS — M31.6 TEMPORAL ARTERITIS SYNDROME (HCC): Primary | ICD-10-CM

## 2023-07-24 RX ORDER — CHLOROQUINE PHOSPHATE 250 MG/1
125 TABLET ORAL DAILY
Qty: 15 TABLET | Refills: 2 | Status: SHIPPED | OUTPATIENT
Start: 2023-07-24 | End: 2023-07-26 | Stop reason: ALTCHOICE

## 2023-07-24 NOTE — TELEPHONE ENCOUNTER
Caller: Patient    Doctor: Rigo Brandon    Reason for call: Pt was prescribed hydroxycholoriquine and she is having itchy spells and rash. Pt would like to know if she should continue taking medications. Pt would also like to know how long she is supposed to take the prednisone 5mg? Please advise.      Call back#: [phone]

## 2023-07-24 NOTE — TELEPHONE ENCOUNTER
Spoke with patient and she said she had the rash before starting HCQ, she is seeing her PCP on Wednesday and she will also talk to him about all the different meds.  So I asked that she give us a call back on Thursday with an update and then maybe you can decide for sure what to do about the HCQ. thanks

## 2023-07-26 ENCOUNTER — OFFICE VISIT (OUTPATIENT)
Dept: INTERNAL MEDICINE CLINIC | Facility: CLINIC | Age: 83
End: 2023-07-26
Payer: MEDICARE

## 2023-07-26 VITALS
RESPIRATION RATE: 16 BRPM | DIASTOLIC BLOOD PRESSURE: 86 MMHG | SYSTOLIC BLOOD PRESSURE: 142 MMHG | HEART RATE: 101 BPM | BODY MASS INDEX: 24.99 KG/M2 | WEIGHT: 135.8 LBS | OXYGEN SATURATION: 95 % | HEIGHT: 62 IN

## 2023-07-26 DIAGNOSIS — M31.6 TEMPORAL ARTERITIS SYNDROME (HCC): Primary | ICD-10-CM

## 2023-07-26 DIAGNOSIS — M05.80 POLYARTHRITIS WITH POSITIVE RHEUMATOID FACTOR (HCC): ICD-10-CM

## 2023-07-26 DIAGNOSIS — L98.9 SKIN LESION: ICD-10-CM

## 2023-07-26 DIAGNOSIS — R60.0 BILATERAL EDEMA OF LOWER EXTREMITY: ICD-10-CM

## 2023-07-26 DIAGNOSIS — H53.9 VISUAL DISTURBANCE: ICD-10-CM

## 2023-07-26 PROCEDURE — 99214 OFFICE O/P EST MOD 30 MIN: CPT | Performed by: INTERNAL MEDICINE

## 2023-07-26 RX ORDER — CEPHALEXIN 250 MG/1
250 CAPSULE ORAL 3 TIMES DAILY
Qty: 15 CAPSULE | Refills: 0 | Status: SHIPPED | OUTPATIENT
Start: 2023-07-26 | End: 2023-07-31

## 2023-07-26 RX ORDER — HYDROXYCHLOROQUINE SULFATE 200 MG/1
300 TABLET, FILM COATED ORAL
Qty: 45 TABLET | Refills: 0 | Status: SHIPPED | OUTPATIENT
Start: 2023-07-26 | End: 2023-08-25

## 2023-07-26 RX ORDER — HYDROCHLOROTHIAZIDE 25 MG/1
25 TABLET ORAL DAILY
Qty: 30 TABLET | Refills: 5 | Status: SHIPPED | OUTPATIENT
Start: 2023-07-26 | End: 2023-07-26

## 2023-07-26 RX ORDER — HYDROCHLOROTHIAZIDE 25 MG/1
25 TABLET ORAL DAILY
Qty: 90 TABLET | Refills: 1 | Status: SHIPPED | OUTPATIENT
Start: 2023-07-26 | End: 2023-08-21 | Stop reason: ALTCHOICE

## 2023-07-26 RX ORDER — OMEPRAZOLE 40 MG/1
40 CAPSULE, DELAYED RELEASE ORAL DAILY
Qty: 90 CAPSULE | Refills: 1 | Status: SHIPPED | OUTPATIENT
Start: 2023-07-26

## 2023-07-26 RX ORDER — HYDROXYCHLOROQUINE SULFATE 200 MG/1
300 TABLET, FILM COATED ORAL 2 TIMES DAILY WITH MEALS
COMMUNITY
End: 2023-07-26

## 2023-07-26 NOTE — PROGRESS NOTES
Assessment/Plan:     Would continue the rheumatology plan. She is sticking with the Plaquenil. For now we can use hydrochlorothiazide diuretic as needed to help with the swelling until the prednisone is totally tapered off. Continue the omeprazole while on steroids. If the skin lesions on her legs do not improve, she will take Keflex, otherwise continue local antibiotic ointment. Follow-up with rheumatology as planned. Quality Measures:       Return in about 3 months (around 10/26/2023) for Recheck. No problem-specific Assessment & Plan notes found for this encounter. Diagnoses and all orders for this visit:    Temporal arteritis syndrome ([address])  -     omeprazole (PriLOSEC) 40 MG capsule; Take 1 capsule (40 mg total) by mouth daily    Polyarthritis with positive rheumatoid factor (HCC)  -     omeprazole (PriLOSEC) 40 MG capsule; Take 1 capsule (40 mg total) by mouth daily    Bilateral edema of lower extremity  -     hydrochlorothiazide (HYDRODIURIL) 25 mg tablet; Take 1 tablet (25 mg total) by mouth daily    Skin lesion  -     cephalexin (KEFLEX) 250 mg capsule; Take 1 capsule (250 mg total) by mouth 3 (three) times a day for 5 days    Visual disturbance  -     omeprazole (PriLOSEC) 40 MG capsule; Take 1 capsule (40 mg total) by mouth daily    Other orders  -     Discontinue: hydroxychloroquine (PLAQUENIL) 200 mg tablet; Take 300 mg by mouth 2 (two) times a day with meals  -     hydroxychloroquine (PLAQUENIL) 200 mg tablet; Take 1.5 tablets (300 mg total) by mouth daily with breakfast          Subjective:      Patient ID: Yao Ham is a 82 y.o. female. Patient comes in today for follow-up. Her rash is resolving. Probably was from the electrolyte drink. She is on the 5 of prednisone and there was some confusion so she has been taking the Plaquenil and was unaware of the chloroquine prescription waiting. Still has the swelling in the lower extremities and elsewhere.   That is annoying her to no end. Asking if she could take a water pill. She also has some sores from scratching. She is using antibiotic ointment.       ALLERGIES:  Allergies   Allergen Reactions   • Sulfa Antibiotics Hives   • Latex Rash       CURRENT MEDICATIONS:    Current Outpatient Medications:   •  cephalexin (KEFLEX) 250 mg capsule, Take 1 capsule (250 mg total) by mouth 3 (three) times a day for 5 days, Disp: 15 capsule, Rfl: 0  •  diphenhydrAMINE (BENADRYL) 25 mg tablet, Take 1 tablet (25 mg total) by mouth daily at bedtime as needed for itching for up to 7 days, Disp: 30 tablet, Rfl: 0  •  diphenoxylate-atropine (LOMOTIL) 2.5-0.025 mg per tablet, Take 1 tablet by mouth 4 (four) times a day as needed for diarrhea, Disp: 60 tablet, Rfl: 1  •  famotidine (PEPCID) 20 mg tablet, Take 1 tablet (20 mg total) by mouth daily for 7 days, Disp: 20 tablet, Rfl: 0  •  fexofenadine (ALLEGRA) 180 MG tablet, Take 180 mg by mouth daily, Disp: , Rfl:   •  fluticasone (FLONASE) 50 mcg/act nasal spray, SHAKE LIQUID AND USE 1 SPRAY IN EACH NOSTRIL DAILY (Patient taking differently: PRN), Disp: 48 g, Rfl: 3  •  hydrochlorothiazide (HYDRODIURIL) 25 mg tablet, Take 1 tablet (25 mg total) by mouth daily, Disp: 30 tablet, Rfl: 5  •  hydroxychloroquine (PLAQUENIL) 200 mg tablet, Take 1.5 tablets (300 mg total) by mouth daily with breakfast, Disp: 45 tablet, Rfl: 0  •  levothyroxine 75 mcg tablet, TAKE 1 TABLET BY MOUTH  DAILY, Disp: 90 tablet, Rfl: 3  •  lisinopril (ZESTRIL) 5 mg tablet, TAKE 1 TABLET BY MOUTH  DAILY, Disp: 90 tablet, Rfl: 3  •  nystatin (MYCOSTATIN) 500,000 units/5 mL suspension, Apply 5 mL (500,000 Units total) to the mouth or throat 4 (four) times a day, Disp: 473 mL, Rfl: 1  •  omeprazole (PriLOSEC) 40 MG capsule, Take 1 capsule (40 mg total) by mouth daily, Disp: 90 capsule, Rfl: 1  •  predniSONE 5 mg tablet, Take 1 tablet (5 mg total) by mouth daily, Disp: 30 tablet, Rfl: 3  •  traZODone (DESYREL) 50 mg tablet, Take 1 tablet (50 mg total) by mouth daily at bedtime, Disp: 30 tablet, Rfl: 5  •  Lumigan 0.01 % ophthalmic drops, , Disp: , Rfl:     ACTIVE PROBLEM LIST:  Patient Active Problem List   Diagnosis   • Allergic rhinitis   • Hyperlipidemia   • Hypertension, benign   • Hypothyroidism   • Vitamin D deficiency   • Depression   • Current moderate episode of major depressive disorder without prior episode (HCC)   • Polyarthritis with positive rheumatoid factor (HCC)   • Visual disturbance   • Amaurosis fugax of left eye   • Temporal arteritis syndrome (HCC)       PAST MEDICAL HISTORY:  Past Medical History:   Diagnosis Date   • Depression 12/30/2021   • Lumbar stenosis    • Transient global amnesia        PAST SURGICAL HISTORY:  Past Surgical History:   Procedure Laterality Date   • CATARACT EXTRACTION     • [place] LIGATION/BIOPSY TEMPORAL ARTERY Left 4/14/2023    Procedure: BIOPSY ARTERY TEMPORAL;  Surgeon: Anel Chavarria MD;  Location: [facility];  Service: General       FAMILY HISTORY:  Family History   Problem Relation Age of Onset   • Stroke Mother 61        CVA   • Pancreatic cancer Father    • Hyperlipidemia Sister        SOCIAL HISTORY:  Social History     Socioeconomic History   • Marital status: /Civil Union     Spouse name: Not on file   • Number of children: 3   • Years of education: Not on file   • Highest education level: Not on file   Occupational History   • Occupation: [employer]     Comment: Full time employment   Tobacco Use   • Smoking status: Former   • Smokeless tobacco: Never   Vaping Use   • Vaping Use: Never used   Substance and Sexual Activity   • Alcohol use: Yes     Comment: glass white wine with dinner   • Drug use: No   • Sexual activity: Not Currently     Partners: Male   Other Topics Concern   • Not on file   Social History Narrative   • Not on file     Social Determinants of Health     Financial Resource Strain: Not on file   Food Insecurity: Not on file   Transportation Needs: Not on file Physical Activity: Not on file   Stress: Not on file   Social Connections: Not on file   Intimate Partner Violence: Not on file   Housing Stability: Not on file       Review of Systems   Constitutional: Negative for fever. Respiratory: Negative for shortness of breath. Cardiovascular: Negative for chest pain. Objective:  Vitals:    07/26/23 1316   BP: 142/86   BP Location: Left arm   Patient Position: Sitting   Cuff Size: Adult   Pulse: 101   Resp: 16   SpO2: 95%   Weight: 61.6 kg (135 lb 12.8 oz)   Height: 5' 2" (1.575 m)     Body mass index is 24.84 kg/m². Physical Exam  Vitals and nursing note reviewed. Constitutional:       Appearance: Normal appearance. Neurological:      Mental Status: She is alert. Psychiatric:         Mood and Affect: Mood normal.         Behavior: Behavior normal.           RESULTS:    In chart    This note was created with voice recognition software. Phonic, grammatical and spelling errors may be present within the note as a result.

## 2023-08-02 DIAGNOSIS — Z79.52 CURRENT USE OF STEROID MEDICATION: ICD-10-CM

## 2023-08-02 DIAGNOSIS — F19.982 DRUG-INDUCED INSOMNIA (HCC): ICD-10-CM

## 2023-08-02 DIAGNOSIS — M31.6 TEMPORAL ARTERITIS SYNDROME (HCC): ICD-10-CM

## 2023-08-02 RX ORDER — PREDNISONE 5 MG/1
5 TABLET ORAL DAILY
Qty: 90 TABLET | Refills: 0 | Status: SHIPPED | OUTPATIENT
Start: 2023-08-02 | End: 2023-08-14

## 2023-08-02 RX ORDER — TRAZODONE HYDROCHLORIDE 50 MG/1
50 TABLET ORAL
Qty: 90 TABLET | Refills: 1 | Status: SHIPPED | OUTPATIENT
Start: 2023-08-02

## 2023-08-02 NOTE — TELEPHONE ENCOUNTER
Patient called and asked to have Prednisone called into [facility] since there is 3 refills on (90day supply). Patient stated only has a couple pills left.

## 2023-08-08 ENCOUNTER — TELEPHONE (OUTPATIENT)
Dept: RHEUMATOLOGY | Facility: CLINIC | Age: 83
End: 2023-08-08

## 2023-08-08 ENCOUNTER — TELEPHONE (OUTPATIENT)
Dept: INTERNAL MEDICINE CLINIC | Facility: CLINIC | Age: 83
End: 2023-08-08

## 2023-08-08 NOTE — TELEPHONE ENCOUNTER
Caller: Patient    Doctor: Nano Shukla    Reason for call: Patient stated she has a rash of pink spots of skin on arms/legs. Her skin is dry,flaky. Her taste buds have changed. Questioned if Plaquenil could be the cause? Patient stated she uses [facility].  Short term meds go to [facility]    Call back#: [phone]

## 2023-08-08 NOTE — TELEPHONE ENCOUNTER
Message left. .... Hi, this is Honor Stepan, Date of birth [birth year] patient projector Giovanny Castro. I'd like to know if they could figure out what is causing side effects from some medicine. One medicine, I don't know which one and that's what I'd like to know if they could figure out. It has to do with the pink spots on my legs and arms peeling skin. It's changing my taste buds, which the food, certain things don't taste and leaves you feeling a little queasy. But it's not nauseous, nauseous. It's just a sense of there's something wrong with the taste buds and the edema that I have with the swelling of the feet. I've been taking the water pills and they're not. They don't seem to help. OK. So I would like a call back and my number is [phone]. Thank you.

## 2023-08-09 DIAGNOSIS — M05.80 POLYARTHRITIS WITH POSITIVE RHEUMATOID FACTOR (HCC): Primary | ICD-10-CM

## 2023-08-09 RX ORDER — HYDROXYCHLOROQUINE SULFATE 200 MG/1
300 TABLET, FILM COATED ORAL
Qty: 45 TABLET | Refills: 3 | Status: SHIPPED | OUTPATIENT
Start: 2023-08-09 | End: 2023-08-14

## 2023-08-09 NOTE — TELEPHONE ENCOUNTER
Caller: patient    Doctor: Evi Gutierrez    Reason for call: patient spoke with PCP and they discussed possibly weaning off of the the prednisone.  They think it might be causing the swelling of patient's feet    Call back#: [phone]

## 2023-08-09 NOTE — TELEPHONE ENCOUNTER
Patient stated that Rigo Brandon her Rheumatologist prescribed the steroids and did not inform her to taper off. Pt does have an upcoming appt with her on 9/1/2023. Please advise.

## 2023-08-09 NOTE — TELEPHONE ENCOUNTER
Patient was called and spoken with. She stated that the rash she had originally went away. This new rash started after she started the plaquenil. Her skin is now scaly and flaking off, she has red dots on her legs,  and her taste has changed. She will be seeing a dermatologist for a wound she has, so she will have them look at the rash to. She feels it is from the medication.

## 2023-08-09 NOTE — TELEPHONE ENCOUNTER
Can we kindly get back to her on this, in case she has f/u q's     Hi, this is Rani Stubbs date of birth is [birth year], telephone number [phone]. So I'm wondering if they're trying to get back to me on an issue that I had put in and I'm hoping that you give me the answers I need. OK. Thank you so much. Please call back so I know what you. Alright, Bye.  Bye

## 2023-08-10 NOTE — TELEPHONE ENCOUNTER
Called and offer patient some options for next week she will call back after she has talked to her .

## 2023-08-11 DIAGNOSIS — I10 ESSENTIAL HYPERTENSION: ICD-10-CM

## 2023-08-11 DIAGNOSIS — E03.9 HYPOTHYROIDISM, UNSPECIFIED TYPE: ICD-10-CM

## 2023-08-11 RX ORDER — LEVOTHYROXINE SODIUM 0.07 MG/1
75 TABLET ORAL DAILY
Qty: 90 TABLET | Refills: 3 | Status: SHIPPED | OUTPATIENT
Start: 2023-08-11

## 2023-08-11 RX ORDER — LISINOPRIL 5 MG/1
5 TABLET ORAL DAILY
Qty: 90 TABLET | Refills: 3 | Status: SHIPPED | OUTPATIENT
Start: 2023-08-11

## 2023-08-14 ENCOUNTER — OFFICE VISIT (OUTPATIENT)
Dept: RHEUMATOLOGY | Facility: CLINIC | Age: 83
End: 2023-08-14
Payer: MEDICARE

## 2023-08-14 VITALS
BODY MASS INDEX: 24.73 KG/M2 | WEIGHT: 134.4 LBS | HEART RATE: 74 BPM | HEIGHT: 62 IN | SYSTOLIC BLOOD PRESSURE: 147 MMHG | DIASTOLIC BLOOD PRESSURE: 77 MMHG

## 2023-08-14 DIAGNOSIS — Z79.52 CURRENT USE OF STEROID MEDICATION: ICD-10-CM

## 2023-08-14 DIAGNOSIS — M31.6 TEMPORAL ARTERITIS SYNDROME (HCC): Primary | ICD-10-CM

## 2023-08-14 DIAGNOSIS — M05.80 POLYARTHRITIS WITH POSITIVE RHEUMATOID FACTOR (HCC): ICD-10-CM

## 2023-08-14 PROCEDURE — 99214 OFFICE O/P EST MOD 30 MIN: CPT | Performed by: PHYSICIAN ASSISTANT

## 2023-08-14 RX ORDER — HYDROXYCHLOROQUINE SULFATE 200 MG/1
200 TABLET, FILM COATED ORAL
Qty: 45 TABLET | Refills: 3 | Status: SHIPPED | OUTPATIENT
Start: 2023-08-14 | End: 2023-09-13

## 2023-08-14 RX ORDER — PREDNISONE 2.5 MG/1
2.5 TABLET ORAL DAILY
Qty: 90 TABLET | Refills: 0 | Status: SHIPPED | OUTPATIENT
Start: 2023-08-14 | End: 2023-11-12

## 2023-08-14 NOTE — PATIENT INSTRUCTIONS
Cut prednisone to 2.5mg daily til the end of the month, then can stop.   Decrease hydroxychloroquine to 200mg daily (1 tablet)  Get labs in September to check inflammation markers

## 2023-08-14 NOTE — PROGRESS NOTES
Assessment and Plan:   Ms. Jimena Quan is an 82-year-old female who presents for rheumatology follow-up of temporal artery biopsy-proven GCA. The patient was initially started on prednisone taper in March 2023. The patient was complaining of side effects from the steroids including edema, oral thrush, etc., therefore steroids were tapered down quickly. Since 7/10/23, she has remained on prednisone 5 mg daily and was initiated on hydroxychloroquine as steroid sparing agent. Most recent inflammation markers from 7/7/2023 revealed improvement with sed rate from 60 down to 12 and CRP from 39.2 to 4.2. The patient is asymptomatic at this time without any signs or symptoms classically seen with GCA/PMR. She prefers to come off of the steroids due to lower extremity edema, therefore we discussed that she can cut the prednisone in half to 2.5 mg for the rest of this month, then discontinue. She would also like to take the lowest dose possible of hydroxychloroquine, therefore we discussed that she can take 200 mg once daily instead of 300 mg daily. Continue visual field testing with ophthalmology. We will update inflammation markers next month and she will reach out if any worsening symptoms. Follow-up with ophthalmology as scheduled. Follow-up with dermatology as scheduled for nonspecific rash, unclear if related to medications or not. If believed to be from HCQ, can consider changing to chloroquine. As discussed at last visit, the patient does have history of positive rheumatoid factor at a titer of 32 IU/ml. No symptoms to suggest rheumatoid arthritis at this time, therefore we will continue to monitor. Follow-up in 4 months. Plan:  Diagnoses and all orders for this visit:    Temporal arteritis syndrome ([address])  -     C-reactive protein  -     Sedimentation rate, automated  -     predniSONE 2.5 mg tablet;  Take 1 tablet (2.5 mg total) by mouth daily    Current use of steroid medication  -     C-reactive protein  -     Sedimentation rate, automated  -     predniSONE 2.5 mg tablet; Take 1 tablet (2.5 mg total) by mouth daily    Polyarthritis with positive rheumatoid factor (HCC)  -     hydroxychloroquine (PLAQUENIL) 200 mg tablet; Take 1 tablet (200 mg total) by mouth daily with breakfast        I have personally reviewed prior notes, recent laboratory results, and pertinent films in PACS. Activities as tolerated. Exercise: try to maintain a low impact exercise regimen as much as possible. Walk for 30 minutes a day for at least 3 days a week. Continue other medications as prescribed by PCP and other specialists. RTC in 4 months    Rheumatic Disease Summary:  1. Biopsy-proven temporal arteritis  -Initial visit 4/28/2023: Continue prednisone taper, tapering down by 10 mg every 2 weeks. -Visit 8/14/2023: Asymptomatic. Inflammation markers wnl. Decrease pred from 5 mg QD to 2.5 QD for remainder of the month, then stop. Decrease HCQ to 200 QD due to patient preference. 2.  History of PMR, March 2022  -Presented with stiffness in the shoulder girdle and hip girdle. CRP 9.8, sed rate 19. Resolved with a steroid course. 3.  Positive rheumatoid factor (32 IU/mL)  -Resulted on labs from 12/2021.  -No sx of RA. 4.  Other comorbidities: Hypertension, hypothyroidism, hyperlipidemia       HPI  Ms. Dalton Marley is an 72-year-old female who presents for rheumatology follow-up of temporal artery biopsy-proven GCA. The patient complains of weight gain, lower extremity edema, loss of taste that she attributes to the prednisone use. She wants to come off of prednisone as soon as possible. She has been taking 5 mg daily. She denies any recent headaches, jaw claudication, shoulder stiffness or stiffness of the pelvic girdle. She notes that her vision initially decreased around the time of diagnosis with the GCA, but has not worsened over time.   She states that she has an upcoming visit with the ophthalmologist in November, but she may move this visit up sooner. She does mention a rash that started sometime ago and initially appeared after she started drinking "Liquid IV."  She was directed to stop drinking this supplement and the rash did improve dramatically. She still does have some rash on her lower legs that is itchy at times, but mostly dry. She denies pain with the rash. The following portions of the patient's history were reviewed and updated as appropriate: allergies, current medications, past family history, past medical history, past social history, past surgical history and problem list.      Review of Systems  Constitutional: Negative for weight change, fevers, chills, night sweats, fatigue. ENT/Mouth: Negative for hearing changes, ear pain, nasal congestion, sinus pain, hoarseness, sore throat, rhinorrhea, swallowing difficulty. Eyes: Negative for pain, redness, discharge, vision changes. Cardiovascular: Negative for chest pain, SOB, palpitations. Respiratory: Negative for cough, sputum, wheezing, dyspnea. Gastrointestinal: Negative for nausea, vomiting, diarrhea, constipation, pain, heartburn. Genitourinary: Negative for dysuria, urinary frequency, hematuria. Musculoskeletal: As per HPI. Skin: +skin rash, color changes. Neuro: Negative for weakness, numbness, tingling, loss of consciousness. Psych: Negative for anxiety, depression. Heme/Lymph: Negative for easy bruising, bleeding, lymphadenopathy.         Past Medical History:   Diagnosis Date   • Depression 12/30/2021   • Lumbar stenosis    • Transient global amnesia        Past Surgical History:   Procedure Laterality Date   • CATARACT EXTRACTION     • IL LIGATION/BIOPSY TEMPORAL ARTERY Left 4/14/2023    Procedure: BIOPSY ARTERY TEMPORAL;  Surgeon: Vince Langford MD;  Location: [facility];  Service: General       Social History     Socioeconomic History   • Marital status: /Civil Union     Spouse name: Not on file   • Number of children: 3   • Years of education: Not on file   • Highest education level: Not on file   Occupational History   • Occupation: [employer] office     Comment: Full time employment   Tobacco Use   • Smoking status: Former   • Smokeless tobacco: Never   Vaping Use   • Vaping Use: Never used   Substance and Sexual Activity   • Alcohol use: Yes     Comment: glass white wine with dinner   • Drug use: No   • Sexual activity: Not Currently     Partners: Male   Other Topics Concern   • Not on file   Social History Narrative   • Not on file     Social Determinants of Health     Financial Resource Strain: Not on file   Food Insecurity: Not on file   Transportation Needs: Not on file   Physical Activity: Not on file   Stress: Not on file   Social Connections: Not on file   Intimate Partner Violence: Not on file   Housing Stability: Not on file       Family History   Problem Relation Age of Onset   • Stroke Mother 61        CVA   • Pancreatic cancer Father    • Hyperlipidemia Sister        Allergies   Allergen Reactions   • Sulfa Antibiotics Hives   • Latex Rash         Current Outpatient Medications:   •  diphenoxylate-atropine (LOMOTIL) 2.5-0.025 mg per tablet, Take 1 tablet by mouth 4 (four) times a day as needed for diarrhea, Disp: 60 tablet, Rfl: 1  •  fexofenadine (ALLEGRA) 180 MG tablet, Take 180 mg by mouth daily, Disp: , Rfl:   •  fluticasone (FLONASE) 50 mcg/act nasal spray, SHAKE LIQUID AND USE 1 SPRAY IN EACH NOSTRIL DAILY (Patient taking differently: PRN), Disp: 48 g, Rfl: 3  •  hydrochlorothiazide (HYDRODIURIL) 25 mg tablet, TAKE 1 TABLET(25 MG) BY MOUTH DAILY, Disp: 90 tablet, Rfl: 1  •  hydroxychloroquine (PLAQUENIL) 200 mg tablet, Take 1 tablet (200 mg total) by mouth daily with breakfast, Disp: 45 tablet, Rfl: 3  •  levothyroxine 75 mcg tablet, Take 1 tablet (75 mcg total) by mouth daily, Disp: 90 tablet, Rfl: 3  •  lisinopril (ZESTRIL) 5 mg tablet, Take 1 tablet (5 mg total) by mouth daily, Disp: 90 tablet, Rfl: 3  •  nystatin (MYCOSTATIN) 500,000 units/5 mL suspension, Apply 5 mL (500,000 Units total) to the mouth or throat 4 (four) times a day, Disp: 473 mL, Rfl: 1  •  omeprazole (PriLOSEC) 40 MG capsule, Take 1 capsule (40 mg total) by mouth daily, Disp: 90 capsule, Rfl: 1  •  predniSONE 2.5 mg tablet, Take 1 tablet (2.5 mg total) by mouth daily, Disp: 90 tablet, Rfl: 0  •  traZODone (DESYREL) 50 mg tablet, Take 1 tablet (50 mg total) by mouth daily at bedtime, Disp: 90 tablet, Rfl: 1  •  diphenhydrAMINE (BENADRYL) 25 mg tablet, Take 1 tablet (25 mg total) by mouth daily at bedtime as needed for itching for up to 7 days, Disp: 30 tablet, Rfl: 0  •  famotidine (PEPCID) 20 mg tablet, Take 1 tablet (20 mg total) by mouth daily for 7 days, Disp: 20 tablet, Rfl: 0  •  Lumigan 0.01 % ophthalmic drops, , Disp: , Rfl:       Objective:    Vitals:    08/14/23 1257   BP: 147/77   BP Location: Left arm   Patient Position: Sitting   Cuff Size: Standard   Pulse: 74   Weight: 61 kg (134 lb 6.4 oz)   Height: 5' 2" (1.575 m)       Physical Exam  General: Well appearing, well nourished, in no acute distress. Oriented x 3, normal mood and affect. Ambulating without difficulty. Skin: + Nonspecific red rash of the bilateral lower extremities. Couple dry patches. Right lower extremity crusted area resembling actinic keratosis. Hair: Normal texture and distribution. Nails: Normal color, no deformities. HEENT:  Head: No temporal or scalp tenderness. Normocephalic, atraumatic. Eyes: Conjunctiva clear, sclera non-icteric, EOM intact. Nose: No external lesions, mucosa non-inflamed. Mouth: Mucous membranes moist, no mucosal lesions. Neck: Supple  Musculoskeletal:   No asymmetry or deformities noted of bilateral upper and lower extremities. No tenderness to palpation or swelling of joints bilaterally to include: shoulder, elbow, wrist, MCP I-V, PIP I-V, knee, ankle, MTP I-V. Neurologic: Alert and oriented.  No focal neurological deficits appreciated. Psychiatric: Normal mood and affect.        Roel Zazueta PA-C  Rheumatology

## 2023-08-18 ENCOUNTER — TELEPHONE (OUTPATIENT)
Dept: INTERNAL MEDICINE CLINIC | Facility: CLINIC | Age: 83
End: 2023-08-18

## 2023-08-18 DIAGNOSIS — R60.0 BILATERAL EDEMA OF LOWER EXTREMITY: Primary | ICD-10-CM

## 2023-08-18 NOTE — TELEPHONE ENCOUNTER
Message left. ... Hi, this is hetras. Date of birth [birth year] patient, doctor belen. I'd like him to give me a some help with the water pill that I'm taking for the swelling in my feet and ankles. The dermatologist said that has to be really addressed because the ones, the pills I have now are not working. OK. So can he please either do something else about that or does he want to see me again? This is really getting to be disruptive to my walking and everything and the effect on my skin. OK. So please give me a call back at [phone]. Thank you very much. No, I didn't. So I opened my door and I thought they were no.

## 2023-08-21 RX ORDER — FUROSEMIDE 20 MG/1
20 TABLET ORAL DAILY
Qty: 30 TABLET | Refills: 1 | Status: SHIPPED | OUTPATIENT
Start: 2023-08-21

## 2023-08-21 NOTE — TELEPHONE ENCOUNTER
Katey Tang MD  [facility] Internal Med Clinical 58 minutes ago (12:29 PM)       I switched her to Lasix but she should also follow-up within 1 week here on the new water pill.

## 2023-08-23 NOTE — TELEPHONE ENCOUNTER
Patient started the new water pill yesterday. Her feet are still swollen and offered her an appointment Friday 08/25. She is also experiencing some left hand/arm tingling and numbness. She has no other symptoms. She declined appointment for Friday due to her sister visiting. No appointments available next week. Please advise. .    Call back #[phone]

## 2023-08-29 ENCOUNTER — OFFICE VISIT (OUTPATIENT)
Dept: INTERNAL MEDICINE CLINIC | Facility: CLINIC | Age: 83
End: 2023-08-29
Payer: MEDICARE

## 2023-08-29 VITALS
HEIGHT: 62 IN | BODY MASS INDEX: 24.84 KG/M2 | RESPIRATION RATE: 12 BRPM | OXYGEN SATURATION: 100 % | DIASTOLIC BLOOD PRESSURE: 70 MMHG | HEART RATE: 60 BPM | SYSTOLIC BLOOD PRESSURE: 122 MMHG | WEIGHT: 135 LBS

## 2023-08-29 DIAGNOSIS — M31.6 TEMPORAL ARTERITIS SYNDROME (HCC): ICD-10-CM

## 2023-08-29 DIAGNOSIS — R60.0 BILATERAL EDEMA OF LOWER EXTREMITY: Primary | ICD-10-CM

## 2023-08-29 DIAGNOSIS — L98.9 SKIN LESION: ICD-10-CM

## 2023-08-29 PROCEDURE — 99214 OFFICE O/P EST MOD 30 MIN: CPT | Performed by: INTERNAL MEDICINE

## 2023-08-29 NOTE — PROGRESS NOTES
Assessment/Plan:     Stay off the prednisone. At this point because of the unclear side effects, taper off the hydroxychloroquine. I told her I would let rheumatology know. Follow-up with the dermatology biopsy results. Continue the Lasix 20 mg daily for now. Quality Measures:       Return in about 2 weeks (around 9/12/2023) for Recheck. No problem-specific Assessment & Plan notes found for this encounter. Diagnoses and all orders for this visit:    Bilateral edema of lower extremity    Skin lesion    Temporal arteritis syndrome (HCC)          Subjective:      Patient ID: Michelle Neil is a 82 y.o. female. Patient comes in today because she is having continued issues issues with swelling in the lower extremities. But she also has swelling in her cheeks. She has a dowagers hump. This is all most likely from the prednisone. She just stopped it a few weeks ago. Still has the rash and sores on her lower legs. She has seen dermatology. She states that she is waiting for biopsy results from one lesion. She is nervous at the hydroxychloroquine is causing the rash and lesions on her legs. At this point, wants to stop that medicine as well.       ALLERGIES:  Allergies   Allergen Reactions   • Sulfa Antibiotics Hives   • Latex Rash       CURRENT MEDICATIONS:    Current Outpatient Medications:   •  diphenoxylate-atropine (LOMOTIL) 2.5-0.025 mg per tablet, Take 1 tablet by mouth 4 (four) times a day as needed for diarrhea, Disp: 60 tablet, Rfl: 1  •  fexofenadine (ALLEGRA) 180 MG tablet, Take 180 mg by mouth daily, Disp: , Rfl:   •  fluticasone (FLONASE) 50 mcg/act nasal spray, SHAKE LIQUID AND USE 1 SPRAY IN EACH NOSTRIL DAILY (Patient taking differently: PRN), Disp: 48 g, Rfl: 3  •  furosemide (LASIX) 20 mg tablet, Take 1 tablet (20 mg total) by mouth daily, Disp: 30 tablet, Rfl: 1  •  levothyroxine 75 mcg tablet, Take 1 tablet (75 mcg total) by mouth daily, Disp: 90 tablet, Rfl: 3  •  lisinopril (ZESTRIL) 5 mg tablet, Take 1 tablet (5 mg total) by mouth daily, Disp: 90 tablet, Rfl: 3  •  nystatin (MYCOSTATIN) 500,000 units/5 mL suspension, Apply 5 mL (500,000 Units total) to the mouth or throat 4 (four) times a day, Disp: 473 mL, Rfl: 1  •  omeprazole (PriLOSEC) 40 MG capsule, Take 1 capsule (40 mg total) by mouth daily, Disp: 90 capsule, Rfl: 1  •  traZODone (DESYREL) 50 mg tablet, Take 1 tablet (50 mg total) by mouth daily at bedtime, Disp: 90 tablet, Rfl: 1  •  Lumigan 0.01 % ophthalmic drops, , Disp: , Rfl:     ACTIVE PROBLEM LIST:  Patient Active Problem List   Diagnosis   • Allergic rhinitis   • Hyperlipidemia   • Hypertension, benign   • Hypothyroidism   • Vitamin D deficiency   • Depression   • Current moderate episode of major depressive disorder without prior episode (HCC)   • Polyarthritis with positive rheumatoid factor (HCC)   • Visual disturbance   • Amaurosis fugax of left eye   • Temporal arteritis syndrome (HCC)       PAST MEDICAL HISTORY:  Past Medical History:   Diagnosis Date   • Depression 12/30/2021   • Lumbar stenosis    • Transient global amnesia        PAST SURGICAL HISTORY:  Past Surgical History:   Procedure Laterality Date   • CATARACT EXTRACTION     • CO LIGATION/BIOPSY TEMPORAL ARTERY Left 4/14/2023    Procedure: BIOPSY ARTERY TEMPORAL;  Surgeon: Yina Rain MD;  Location: [facility];  Service: General       FAMILY HISTORY:  Family History   Problem Relation Age of Onset   • Stroke Mother 61        CVA   • Pancreatic cancer Father    • Hyperlipidemia Sister        SOCIAL HISTORY:  Social History     Socioeconomic History   • Marital status: /Civil Union     Spouse name: Not on file   • Number of children: 3   • Years of education: Not on file   • Highest education level: Not on file   Occupational History   • Occupation: [employer]     Comment: Full time employment   Tobacco Use   • Smoking status: Former   • Smokeless tobacco: Never   Vaping Use   • Vaping Use: Never used   Substance and Sexual Activity   • Alcohol use: Yes     Comment: glass white wine with dinner   • Drug use: No   • Sexual activity: Not Currently     Partners: Male   Other Topics Concern   • Not on file   Social History Narrative   • Not on file     Social Determinants of Health     Financial Resource Strain: Not on file   Food Insecurity: Not on file   Transportation Needs: Not on file   Physical Activity: Not on file   Stress: Not on file   Social Connections: Not on file   Intimate Partner Violence: Not on file   Housing Stability: Not on file       Review of Systems   Constitutional: Negative for fever. Respiratory: Negative for shortness of breath. Cardiovascular: Negative for chest pain. Objective:  Vitals:    08/29/23 1300   BP: 122/70   BP Location: Left arm   Patient Position: Sitting   Pulse: 60   Resp: 12   SpO2: 100%   Weight: 61.2 kg (135 lb)   Height: 5' 2" (1.575 m)     Body mass index is 24.69 kg/m². Physical Exam  Vitals and nursing note reviewed. Constitutional:       Appearance: Normal appearance. Musculoskeletal:      Right lower leg: Edema present. Left lower leg: Edema present. Skin:     Findings: Rash present. Neurological:      Mental Status: She is alert.            RESULTS:  Cholesterol   Date/Time Value Ref Range Status   07/07/2023 10:56  (H) See Comment mg/dL Final     Comment:     Cholesterol:         Pediatric <18 Years        Desirable          <170 mg/dL      Borderline High    170-199 mg/dL      High               >=200 mg/dL        Adult >=18 Years            Desirable        <200 mg/dL      Borderline High  200-239 mg/dL      High             >239 mg/dL       Triglycerides   Date/Time Value Ref Range Status   07/07/2023 10:56  See Comment mg/dL Final     Comment:     Triglyceride:     0-9Y            <75mg/dL     10Y-17Y         <90 mg/dL       >=18Y     Normal          <150 mg/dL     Borderline High 150-199 mg/dL     High 200-499 mg/dL        Very High       >499 mg/dL    Specimen collection should occur prior to N-Acetylcysteine or Metamizole administration due to the potential for falsely depressed results. 12/02/2015 11:22  mg/dL Final     Comment:     TRIGLYCERIDE:       Normal              <150 mg/dl       Borderline High    150-199 mg/dl       High               200-499 mg/dl       Very High          >499 mg/dl  _______________________________________       HDL   Date/Time Value Ref Range Status   12/02/2015 11:22 AM 58 mg/dL Final     Comment:     HDL:       High       >59 mg/dl       Low        <41 mg/dl  ______________________________       HDL, Direct   Date/Time Value Ref Range Status   07/07/2023 10:56 AM 57 >=50 mg/dL Final     Comment:     Specimen collection should occur prior to Metamizole administration due to the potential for falsley depressed results. LDL Calculated   Date/Time Value Ref Range Status   07/07/2023 10:56  (H) 0 - 100 mg/dL Final     Comment:     LDL Cholesterol:     Optimal           <100 mg/dl     Near Optimal      100-129 mg/dl     Above Optimal       Borderline High 130-159 mg/dl       High            160-189 mg/dl       Very High       >189 mg/dl         This screening LDL is a calculated result. It does not have the accuracy of the Direct Measured LDL in the monitoring of patients with hyperlipidemia and/or statin therapy. Direct Measure LDL (YNF698) must be ordered separately in these patients. 12/02/2015 11:22 AM 99 0 - 100 mg/dL Final     Comment:     LDL, CALCULATED:     This screening LDL is a calculated result. It does not have the accuracy of the Direct Measured LDL in the  monitoring of patients with hyperlipidemia and/or statin therapy.   Direct Measured LDL (Test Code 0159) must be ordered separately in these  patients.  ______________________________  The above 4 analytes were performed by [id number] Hemoglobin   Date/Time Value Ref Range Status   07/07/2023 10:56 AM 15.0 11.5 - 15.4 g/dL Final   12/02/2015 11:22 AM 14.0 11.5 - 15.4 g/dL Final     Hematocrit   Date/Time Value Ref Range Status   07/07/2023 10:56 AM 45.9 34.8 - 46.1 % Final   12/02/2015 11:22 AM 42.6 34.8 - 46.1 % Final     Platelets   Date/Time Value Ref Range Status   07/07/2023 10:56  149 - 390 Thousands/uL Final   12/02/2015 11:22  149 - 390 Thousand/uL Final     TSH 3RD GENERATON   Date/Time Value Ref Range Status   07/07/2023 10:56 AM 2.240 0.450 - 4.500 uIU/mL Final     Comment:     The recommended reference ranges for TSH during pregnancy are as follows:   First trimester 0.1 to 2.5 uIU/mL   Second trimester  0.2 to 3.0 uIU/mL   Third trimester 0.3 to 3.0 uIU/m    Note: Normal ranges may not apply to patients who are transgender, non-binary, or whose legal sex, sex at birth, and gender identity differ. Adult TSH (3rd generation) reference range follows the recommended guidelines of the [employer] Association, January, 2020.   12/02/2015 11:22 AM 2.880 0.358 - 3.740 uIU/ML Final     Comment:     *Patients undergoing fluorescein dye angiography may retain small  amounts of fluorescein in the body for 48-72 hours post procedure. Samples containing fluorescein can produce falsely depressed TSH   values. If the patient had this procedure, a specimen should be  resubmitted post fluorescein clearance. The above 1 analytes were performed by [facility] [id number]       Free T4   Date/Time Value Ref Range Status   07/07/2023 10:56 AM 1.13 (H) 0.61 - 1.12 ng/dL Final     Comment:     Specimens with biotin concentrations > 10 ng/mL can lead to significant (> 10%) positive bias in result.    12/02/2015 11:22 AM 1.13 0.76 - 1.46 ng/dL Final     Comment:     The above 1 analytes were performed by [facility] [id number]       Sodium   Date/Time Value Ref Range Status   07/07/2023 10:56  135 - 147 mmol/L Final     BUN   Date/Time Value Ref Range Status   07/07/2023 10:56 AM 17 5 - 25 mg/dL Final   12/02/2015 11:22 AM 17 5 - 25 mg/dL Final     Creatinine   Date/Time Value Ref Range Status   07/07/2023 10:56 AM 0.94 0.60 - 1.30 mg/dL Final     Comment:     Standardized to IDMS reference method   12/02/2015 11:22 AM 0.80 0.60 - 1.30 mg/dL Final     Comment:     Standardized to IDMS reference method      In chart    This note was created with voice recognition software. Phonic, grammatical and spelling errors may be present within the note as a result.

## 2023-09-12 ENCOUNTER — TELEPHONE (OUTPATIENT)
Dept: INTERNAL MEDICINE CLINIC | Facility: CLINIC | Age: 83
End: 2023-09-12

## 2023-09-12 DIAGNOSIS — R60.0 BILATERAL EDEMA OF LOWER EXTREMITY: ICD-10-CM

## 2023-09-12 NOTE — TELEPHONE ENCOUNTER
She saw dermatology and recommended higher water pill. For skin issues and water retention. Furocimide. Radha Jamarcus had mentioned in past if that's the case he would want her on pottasium also    Can we advise how much to increase water pill and fill potassium as pt is anxious to start new regiments ? New doses can be sent to [facility] since not sure how much or long would be on them at the dose.

## 2023-09-18 DIAGNOSIS — R60.0 BILATERAL EDEMA OF LOWER EXTREMITY: ICD-10-CM

## 2023-09-18 RX ORDER — POTASSIUM CHLORIDE 750 MG/1
10 TABLET, EXTENDED RELEASE ORAL DAILY
Qty: 90 TABLET | Refills: 1 | Status: SHIPPED | OUTPATIENT
Start: 2023-09-18

## 2023-09-18 RX ORDER — POTASSIUM CHLORIDE 750 MG/1
10 TABLET, EXTENDED RELEASE ORAL DAILY
Qty: 30 TABLET | Refills: 1 | Status: SHIPPED | OUTPATIENT
Start: 2023-09-18 | End: 2023-09-18

## 2023-09-18 RX ORDER — FUROSEMIDE 40 MG/1
40 TABLET ORAL DAILY
Qty: 30 TABLET | Refills: 1 | Status: SHIPPED | OUTPATIENT
Start: 2023-09-18

## 2023-09-18 NOTE — TELEPHONE ENCOUNTER
As per Dr. Franklin Childress, "  I sent a higher dose of the water pill as well as a potassium supplement.  She would take each of them daily.  But I also ordered a blood test that she would do in about a week on this medicine. Spoke with the patient and notified her of this. She verbalized understanding and will follow up on Thursday and repeat labs next week.

## 2023-09-21 ENCOUNTER — OFFICE VISIT (OUTPATIENT)
Dept: INTERNAL MEDICINE CLINIC | Facility: CLINIC | Age: 83
End: 2023-09-21
Payer: MEDICARE

## 2023-09-21 VITALS
WEIGHT: 135 LBS | HEIGHT: 62 IN | SYSTOLIC BLOOD PRESSURE: 124 MMHG | RESPIRATION RATE: 12 BRPM | OXYGEN SATURATION: 96 % | HEART RATE: 94 BPM | DIASTOLIC BLOOD PRESSURE: 66 MMHG | BODY MASS INDEX: 24.84 KG/M2

## 2023-09-21 DIAGNOSIS — R60.0 BILATERAL EDEMA OF LOWER EXTREMITY: Primary | ICD-10-CM

## 2023-09-21 DIAGNOSIS — E03.9 ACQUIRED HYPOTHYROIDISM: ICD-10-CM

## 2023-09-21 DIAGNOSIS — M31.6 TEMPORAL ARTERITIS SYNDROME (HCC): ICD-10-CM

## 2023-09-21 PROCEDURE — 99214 OFFICE O/P EST MOD 30 MIN: CPT | Performed by: INTERNAL MEDICINE

## 2023-09-21 RX ORDER — DOXYCYCLINE HYCLATE 100 MG/1
CAPSULE ORAL
COMMUNITY
Start: 2023-09-14

## 2023-09-21 NOTE — PROGRESS NOTES
Assessment/Plan:     Appears to be improving slowly. Continue the 40 mg of Lasix with 1 potassium daily for now. Get the BMP next week as planned. The swelling side effects from the steroids are starting to finally go away. Stay off of the hydroxychloroquine. Continue follow-up with rheumatology and dermatology. Quality Measures:       Return in about 4 weeks (around 10/19/2023) for Recheck. No problem-specific Assessment & Plan notes found for this encounter. Diagnoses and all orders for this visit:    Bilateral edema of lower extremity    Temporal arteritis syndrome ([address])    Acquired hypothyroidism  -     T4, free; Future  -     TSH, 3rd generation; Future    Other orders  -     doxycycline hyclate (VIBRAMYCIN) 100 mg capsule; TAKE 1 CAPSULE BY MOUTH TWICE DAILY WITH FOOD FOR 1 WEEK          Subjective:      Patient ID: Conner Robbins is a 82 y.o. female. Patient comes in today for follow-up. She states that the 40 mg of Lasix is making her urinate more. She started that on Monday. She is noticing more of a difference in the swelling in her legs. But also her cheeks are all still noticeably less. She has been off the steroids for a while now. She has been off the hydroxychloroquine for about 3 weeks now. She continues to follow with dermatology and rheumatology.       ALLERGIES:  Allergies   Allergen Reactions   • Sulfa Antibiotics Hives   • Latex Rash       CURRENT MEDICATIONS:    Current Outpatient Medications:   •  diphenoxylate-atropine (LOMOTIL) 2.5-0.025 mg per tablet, Take 1 tablet by mouth 4 (four) times a day as needed for diarrhea, Disp: 60 tablet, Rfl: 1  •  doxycycline hyclate (VIBRAMYCIN) 100 mg capsule, TAKE 1 CAPSULE BY MOUTH TWICE DAILY WITH FOOD FOR 1 WEEK, Disp: , Rfl:   •  fexofenadine (ALLEGRA) 180 MG tablet, Take 180 mg by mouth daily, Disp: , Rfl:   •  fluticasone (FLONASE) 50 mcg/act nasal spray, SHAKE LIQUID AND USE 1 SPRAY IN EACH NOSTRIL DAILY (Patient taking differently: PRN), Disp: 48 g, Rfl: 3  •  furosemide (LASIX) 40 mg tablet, Take 1 tablet (40 mg total) by mouth daily, Disp: 30 tablet, Rfl: 1  •  levothyroxine 75 mcg tablet, Take 1 tablet (75 mcg total) by mouth daily, Disp: 90 tablet, Rfl: 3  •  lisinopril (ZESTRIL) 5 mg tablet, Take 1 tablet (5 mg total) by mouth daily, Disp: 90 tablet, Rfl: 3  •  omeprazole (PriLOSEC) 40 MG capsule, Take 1 capsule (40 mg total) by mouth daily, Disp: 90 capsule, Rfl: 1  •  potassium chloride (K-DUR,KLOR-CON) 10 mEq tablet, TAKE 1 TABLET(10 MEQ) BY MOUTH DAILY, Disp: 90 tablet, Rfl: 1  •  traZODone (DESYREL) 50 mg tablet, Take 1 tablet (50 mg total) by mouth daily at bedtime, Disp: 90 tablet, Rfl: 1  •  Lumigan 0.01 % ophthalmic drops, , Disp: , Rfl:   •  nystatin (MYCOSTATIN) 500,000 units/5 mL suspension, Apply 5 mL (500,000 Units total) to the mouth or throat 4 (four) times a day (Patient not taking: Reported on 9/21/2023), Disp: 473 mL, Rfl: 1    ACTIVE PROBLEM LIST:  Patient Active Problem List   Diagnosis   • Allergic rhinitis   • Hyperlipidemia   • Hypertension, benign   • Hypothyroidism   • Vitamin D deficiency   • Depression   • Current moderate episode of major depressive disorder without prior episode (HCC)   • Polyarthritis with positive rheumatoid factor (HCC)   • Visual disturbance   • Amaurosis fugax of left eye   • Temporal arteritis syndrome (HCC)       PAST MEDICAL HISTORY:  Past Medical History:   Diagnosis Date   • Depression 12/30/2021   • Lumbar stenosis    • Transient global amnesia        PAST SURGICAL HISTORY:  Past Surgical History:   Procedure Laterality Date   • CATARACT EXTRACTION     • [place] LIGATION/BIOPSY TEMPORAL ARTERY Left 4/14/2023    Procedure: BIOPSY ARTERY TEMPORAL;  Surgeon: Wali Greenwood MD;  Location: [facility];  Service: General       FAMILY HISTORY:  Family History   Problem Relation Age of Onset   • Stroke Mother 61        CVA   • Pancreatic cancer Father    • Hyperlipidemia Sister SOCIAL HISTORY:  Social History     Socioeconomic History   • Marital status: /Civil Union     Spouse name: Not on file   • Number of children: 3   • Years of education: Not on file   • Highest education level: Not on file   Occupational History   • Occupation: [employer]     Comment: Full time employment   Tobacco Use   • Smoking status: Former   • Smokeless tobacco: Never   Vaping Use   • Vaping Use: Never used   Substance and Sexual Activity   • Alcohol use: Yes     Comment: glass white wine with dinner   • Drug use: No   • Sexual activity: Not Currently     Partners: Male   Other Topics Concern   • Not on file   Social History Narrative   • Not on file     Social Determinants of Health     Financial Resource Strain: Not on file   Food Insecurity: Not on file   Transportation Needs: Not on file   Physical Activity: Not on file   Stress: Not on file   Social Connections: Not on file   Intimate Partner Violence: Not on file   Housing Stability: Not on file       Review of Systems   Respiratory: Negative for shortness of breath. Cardiovascular: Negative for chest pain. Gastrointestinal: Negative for abdominal pain. Objective:  Vitals:    09/21/23 1546   BP: 124/66   BP Location: Left arm   Patient Position: Sitting   Cuff Size: Adult   Pulse: 94   Resp: 12   SpO2: 96%   Weight: 61.2 kg (135 lb)   Height: 5' 2" (1.575 m)     Body mass index is 24.69 kg/m². Physical Exam  Vitals and nursing note reviewed. Constitutional:       Appearance: She is well-developed. Cardiovascular:      Rate and Rhythm: Normal rate and regular rhythm. Heart sounds: Normal heart sounds. Pulmonary:      Effort: Pulmonary effort is normal.      Breath sounds: Normal breath sounds. Musculoskeletal:      Right lower leg: Edema present. Left lower leg: Edema present. Neurological:      Mental Status: She is alert and oriented to person, place, and time.            RESULTS:  Cholesterol Date/Time Value Ref Range Status   07/07/2023 10:56  (H) See Comment mg/dL Final     Comment:     Cholesterol:         Pediatric <18 Years        Desirable          <170 mg/dL      Borderline High    170-199 mg/dL      High               >=200 mg/dL        Adult >=18 Years            Desirable        <200 mg/dL      Borderline High  200-239 mg/dL      High             >239 mg/dL       Triglycerides   Date/Time Value Ref Range Status   07/07/2023 10:56  See Comment mg/dL Final     Comment:     Triglyceride:     0-9Y            <75mg/dL     10Y-17Y         <90 mg/dL       >=18Y     Normal          <150 mg/dL     Borderline High 150-199 mg/dL     High            200-499 mg/dL        Very High       >499 mg/dL    Specimen collection should occur prior to N-Acetylcysteine or Metamizole administration due to the potential for falsely depressed results. 12/02/2015 11:22  mg/dL Final     Comment:     TRIGLYCERIDE:       Normal              <150 mg/dl       Borderline High    150-199 mg/dl       High               200-499 mg/dl       Very High          >499 mg/dl  _______________________________________       HDL   Date/Time Value Ref Range Status   12/02/2015 11:22 AM 58 mg/dL Final     Comment:     HDL:       High       >59 mg/dl       Low        <41 mg/dl  ______________________________       HDL, Direct   Date/Time Value Ref Range Status   07/07/2023 10:56 AM 57 >=50 mg/dL Final     Comment:     Specimen collection should occur prior to Metamizole administration due to the potential for falsley depressed results. LDL Calculated   Date/Time Value Ref Range Status   07/07/2023 10:56  (H) 0 - 100 mg/dL Final     Comment:     LDL Cholesterol:     Optimal           <100 mg/dl     Near Optimal      100-129 mg/dl     Above Optimal       Borderline High 130-159 mg/dl       High            160-189 mg/dl       Very High       >189 mg/dl         This screening LDL is a calculated result.    It does not have the accuracy of the Direct Measured LDL in the monitoring of patients with hyperlipidemia and/or statin therapy. Direct Measure LDL (TTQ846) must be ordered separately in these patients. 12/02/2015 11:22 AM 99 0 - 100 mg/dL Final     Comment:     LDL, CALCULATED:     This screening LDL is a calculated result. It does not have the accuracy of the Direct Measured LDL in the  monitoring of patients with hyperlipidemia and/or statin therapy. Direct Measured LDL (Test Code 3126) must be ordered separately in these  patients.  ______________________________  The above 4 analytes were performed by [facility]  [address] [place] [id number]       Hemoglobin   Date/Time Value Ref Range Status   07/07/2023 10:56 AM 15.0 11.5 - 15.4 g/dL Final   12/02/2015 11:22 AM 14.0 11.5 - 15.4 g/dL Final     Hematocrit   Date/Time Value Ref Range Status   07/07/2023 10:56 AM 45.9 34.8 - 46.1 % Final   12/02/2015 11:22 AM 42.6 34.8 - 46.1 % Final     Platelets   Date/Time Value Ref Range Status   07/07/2023 10:56  149 - 390 Thousands/uL Final   12/02/2015 11:22  149 - 390 Thousand/uL Final     TSH 3RD GENERATON   Date/Time Value Ref Range Status   07/07/2023 10:56 AM 2.240 0.450 - 4.500 uIU/mL Final     Comment:     The recommended reference ranges for TSH during pregnancy are as follows:   First trimester 0.1 to 2.5 uIU/mL   Second trimester  0.2 to 3.0 uIU/mL   Third trimester 0.3 to 3.0 uIU/m    Note: Normal ranges may not apply to patients who are transgender, non-binary, or whose legal sex, sex at birth, and gender identity differ. Adult TSH (3rd generation) reference range follows the recommended guidelines of the American Thyroid Association, January, 2020.   12/02/2015 11:22 AM 2.880 0.358 - 3.740 uIU/ML Final     Comment:     *Patients undergoing fluorescein dye angiography may retain small  amounts of fluorescein in the body for 48-72 hours post procedure.   Samples containing fluorescein can produce falsely depressed TSH   values. If the patient had this procedure, a specimen should be  resubmitted post fluorescein clearance. The above 1 analytes were performed by [facility] [place] [id number]       Free T4   Date/Time Value Ref Range Status   07/07/2023 10:56 AM 1.13 (H) 0.61 - 1.12 ng/dL Final     Comment:     Specimens with biotin concentrations > 10 ng/mL can lead to significant (> 10%) positive bias in result. 12/02/2015 11:22 AM 1.13 0.76 - 1.46 ng/dL Final     Comment:     The above 1 analytes were performed by [facility]  [address] [place] [id number]       Sodium   Date/Time Value Ref Range Status   07/07/2023 10:56  135 - 147 mmol/L Final     BUN   Date/Time Value Ref Range Status   07/07/2023 10:56 AM 17 5 - 25 mg/dL Final   12/02/2015 11:22 AM 17 5 - 25 mg/dL Final     Creatinine   Date/Time Value Ref Range Status   07/07/2023 10:56 AM 0.94 0.60 - 1.30 mg/dL Final     Comment:     Standardized to IDMS reference method   12/02/2015 11:22 AM 0.80 0.60 - 1.30 mg/dL Final     Comment:     Standardized to IDMS reference method      In chart    This note was created with voice recognition software. Phonic, grammatical and spelling errors may be present within the note as a result.

## 2023-09-27 ENCOUNTER — APPOINTMENT (OUTPATIENT)
Age: 83
End: 2023-09-27
Payer: MEDICARE

## 2023-09-27 DIAGNOSIS — R60.0 BILATERAL EDEMA OF LOWER EXTREMITY: ICD-10-CM

## 2023-09-27 DIAGNOSIS — E03.9 ACQUIRED HYPOTHYROIDISM: ICD-10-CM

## 2023-09-27 LAB
ANION GAP SERPL CALCULATED.3IONS-SCNC: 10 MMOL/L
BUN SERPL-MCNC: 21 MG/DL (ref 5–25)
CALCIUM SERPL-MCNC: 9.1 MG/DL (ref 8.4–10.2)
CHLORIDE SERPL-SCNC: 100 MMOL/L (ref 96–108)
CO2 SERPL-SCNC: 29 MMOL/L (ref 21–32)
CREAT SERPL-MCNC: 0.88 MG/DL (ref 0.6–1.3)
CRP SERPL QL: 10.7 MG/L
ERYTHROCYTE [SEDIMENTATION RATE] IN BLOOD: 21 MM/HOUR (ref 0–29)
GFR SERPL CREATININE-BSD FRML MDRD: 61 ML/MIN/1.73SQ M
GLUCOSE P FAST SERPL-MCNC: 81 MG/DL (ref 65–99)
POTASSIUM SERPL-SCNC: 4.2 MMOL/L (ref 3.5–5.3)
SODIUM SERPL-SCNC: 139 MMOL/L (ref 135–147)
T4 FREE SERPL-MCNC: 1.05 NG/DL (ref 0.61–1.12)
TSH SERPL DL<=0.05 MIU/L-ACNC: 1.73 UIU/ML (ref 0.45–4.5)

## 2023-09-27 PROCEDURE — 36415 COLL VENOUS BLD VENIPUNCTURE: CPT

## 2023-09-27 PROCEDURE — 84443 ASSAY THYROID STIM HORMONE: CPT

## 2023-09-27 PROCEDURE — 84439 ASSAY OF FREE THYROXINE: CPT

## 2023-09-27 PROCEDURE — 80048 BASIC METABOLIC PNL TOTAL CA: CPT

## 2023-10-06 ENCOUNTER — TELEPHONE (OUTPATIENT)
Dept: INTERNAL MEDICINE CLINIC | Facility: CLINIC | Age: 83
End: 2023-10-06

## 2023-10-06 NOTE — TELEPHONE ENCOUNTER
Patient said that she is taking the 40 mg tablet of Furosemide and she said the swelling has gotten a little better but not much. She said that you had mentioned her taking a 20 mg along with the 40 mg of the Furosemide? Patient would like to know what you think of that and if so how should she take the 20 mg? (everyday or every other day)    Patient also said that the rash is still really bad on her back, legs, thighs and her arms. Please advise. ..... Shiv Castro

## 2023-11-01 ENCOUNTER — OFFICE VISIT (OUTPATIENT)
Dept: INTERNAL MEDICINE CLINIC | Facility: CLINIC | Age: 83
End: 2023-11-01
Payer: MEDICARE

## 2023-11-01 VITALS
OXYGEN SATURATION: 97 % | WEIGHT: 130.2 LBS | DIASTOLIC BLOOD PRESSURE: 64 MMHG | RESPIRATION RATE: 14 BRPM | TEMPERATURE: 96.7 F | SYSTOLIC BLOOD PRESSURE: 106 MMHG | BODY MASS INDEX: 23.96 KG/M2 | HEART RATE: 103 BPM | HEIGHT: 62 IN

## 2023-11-01 DIAGNOSIS — H50.60 BROWN'S SYNDROME: ICD-10-CM

## 2023-11-01 DIAGNOSIS — R60.0 BILATERAL EDEMA OF LOWER EXTREMITY: Primary | ICD-10-CM

## 2023-11-01 PROCEDURE — 99214 OFFICE O/P EST MOD 30 MIN: CPT | Performed by: INTERNAL MEDICINE

## 2023-11-01 RX ORDER — TRIAMCINOLONE ACETONIDE 1 MG/G
CREAM TOPICAL
COMMUNITY
Start: 2023-09-26

## 2023-11-01 RX ORDER — BRIMONIDINE TARTRATE 2 MG/ML
SOLUTION/ DROPS OPHTHALMIC
COMMUNITY
Start: 2023-10-11

## 2023-11-01 NOTE — PROGRESS NOTES
Assessment/Plan:       Appears to be improving. Suggested starting taking furosemide 40 mg daily alternating with 20 mg daily. She may adjust further from there. For the rash, Brown syndrome, continue follow-up with dermatology. Continue monitoring with rheumatology. Quality Measures:       Return in about 3 months (around 2/1/2024) for Regular visit and Medicare Wellness. No problem-specific Assessment & Plan notes found for this encounter. Diagnoses and all orders for this visit:    Bilateral edema of lower extremity    Brown's syndrome    Other orders  -     brimonidine tartrate 0.2 % ophthalmic solution  -     triamcinolone (KENALOG) 0.1 % cream  -     Dermatological Products, Misc. [facility] EX); Apply topically          Subjective:      Patient ID: Jesica Espinoza is a 82 y.o. female. Patient comes in today for follow-up. The swelling is going down in her legs and in her face. The steroids are definitely wearing off and the cushingoid features are resolving. She tried the extra water pill but not much difference so she is taking just the 1 pill now. The swelling is down to just her feet. Sometimes there is burning in the feet. 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 She is off all rheumatologic medications for a while now. No new issues.         ALLERGIES:  Allergies   Allergen Reactions   • Sulfa Antibiotics Hives   • Latex Rash       CURRENT MEDICATIONS:    Current Outpatient Medications:   •  brimonidine tartrate 0.2 % ophthalmic solution, , Disp: , Rfl:   •  Dermatological Products, Misc. (EPICER[facility] EX), Apply topically, Disp: , Rfl:   •  fexofenadine (ALLEGRA) 180 MG tablet, Take 180 mg by mouth daily, Disp: , Rfl:   •  furosemide (LASIX) 40 mg tablet, Take 1 tablet (40 mg total) by mouth daily, Disp: 30 tablet, Rfl: 1  •  levothyroxine 75 mcg tablet, Take 1 tablet (75 mcg total) by mouth daily, Disp: 90 tablet, Rfl: 3  •  lisinopril (ZESTRIL) 5 mg tablet, Take 1 tablet (5 mg total) by mouth daily, Disp: 90 tablet, Rfl: 3  •  Lumigan 0.01 % ophthalmic drops, , Disp: , Rfl:   •  omeprazole (PriLOSEC) 40 MG capsule, Take 1 capsule (40 mg total) by mouth daily, Disp: 90 capsule, Rfl: 1  •  potassium chloride (K-DUR,KLOR-CON) 10 mEq tablet, TAKE 1 TABLET(10 MEQ) BY MOUTH DAILY, Disp: 90 tablet, Rfl: 1  •  traZODone (DESYREL) 50 mg tablet, Take 1 tablet (50 mg total) by mouth daily at bedtime, Disp: 90 tablet, Rfl: 1  •  triamcinolone (KENALOG) 0.1 % cream, , Disp: , Rfl:   •  diphenoxylate-atropine (LOMOTIL) 2.5-0.025 mg per tablet, Take 1 tablet by mouth 4 (four) times a day as needed for diarrhea (Patient not taking: Reported on 11/1/2023), Disp: 60 tablet, Rfl: 1    ACTIVE PROBLEM LIST:  Patient Active Problem List   Diagnosis   • Allergic rhinitis   • Hyperlipidemia   • Hypertension, benign   • Hypothyroidism   • Vitamin D deficiency   • Depression   • Current moderate episode of major depressive disorder without prior episode (HCC)   • Polyarthritis with positive rheumatoid factor (HCC)   • Visual disturbance   • Amaurosis fugax of left eye   • Temporal arteritis syndrome (HCC)       PAST MEDICAL HISTORY:  Past Medical History:   Diagnosis Date   • Depression 12/30/2021   • Lumbar stenosis    • Transient global amnesia        PAST SURGICAL HISTORY:  Past Surgical History:   Procedure Laterality Date   • CATARACT EXTRACTION     • [place] SURGERY Right 10/25/2023    R Leg   • [place] LIGATION/BIOPSY TEMPORAL ARTERY Left 04/14/2023    Procedure: BIOPSY ARTERY TEMPORAL;  Surgeon: Sirisha Sheppard MD;  Location: [facility];  Service: General       FAMILY HISTORY:  Family History   Problem Relation Age of Onset   • Stroke Mother 61        CVA   • Pancreatic cancer Father    • Hyperlipidemia Sister        SOCIAL HISTORY:  Social History     Socioeconomic History   • Marital status: /Civil Union     Spouse name: Not on file   • Number of children: 3   • Years of education: Not on file   • Highest education level: Not on file   Occupational History   • Occupation: [employer] office     Comment: Full time employment   Tobacco Use   • Smoking status: Former   • Smokeless tobacco: Never   Vaping Use   • Vaping Use: Never used   Substance and Sexual Activity   • Alcohol use: Yes     Comment: glass white wine with dinner   • Drug use: No   • Sexual activity: Not Currently     Partners: Male   Other Topics Concern   • Not on file   Social History Narrative   • Not on file     Social Determinants of Health     Financial Resource Strain: Not on file   Food Insecurity: Not on file   Transportation Needs: Not on file   Physical Activity: Not on file   Stress: Not on file   Social Connections: Not on file   Intimate Partner Violence: Not on file   Housing Stability: Not on file       Review of Systems   Constitutional:  Negative for fever. Objective:  Vitals:    11/01/23 1318   BP: 106/64   BP Location: Left arm   Patient Position: Sitting   Cuff Size: Adult   Pulse: 103   Resp: 14   Temp: (!) 96.7 °F (35.9 °C)   TempSrc: Tympanic   SpO2: 97%   Weight: 59.1 kg (130 lb 3.2 oz)   Height: 5' 2" (1.575 m)     Body mass index is 23.81 kg/m². Physical Exam  Vitals and nursing note reviewed. Constitutional:       Appearance: Normal appearance. Cardiovascular:      Rate and Rhythm: Normal rate and regular rhythm. Pulmonary:      Effort: Pulmonary effort is normal.      Breath sounds: Normal breath sounds. No wheezing, rhonchi or rales. Musculoskeletal:      Right lower leg: Edema (Limited to below the ankle at this point) present. Left lower leg: Edema (Admitted to below the ankle at this point) present. Skin:     Findings: Rash present. Neurological:      Mental Status: She is alert.            RESULTS:  Cholesterol   Date/Time Value Ref Range Status   07/07/2023 10:56  (H) See Comment mg/dL Final     Comment:     Cholesterol:         Pediatric <18 Years        Desirable          <170 mg/dL      Borderline High    170-199 mg/dL      High               >=200 mg/dL        Adult >=18 Years            Desirable        <200 mg/dL      Borderline High  200-239 mg/dL      High             >239 mg/dL       Triglycerides   Date/Time Value Ref Range Status   07/07/2023 10:56  See Comment mg/dL Final     Comment:     Triglyceride:     0-9Y            <75mg/dL     10Y-17Y         <90 mg/dL       >=18Y     Normal          <150 mg/dL     Borderline High 150-199 mg/dL     High            200-499 mg/dL        Very High       >499 mg/dL    Specimen collection should occur prior to N-Acetylcysteine or Metamizole administration due to the potential for falsely depressed results. 12/02/2015 11:22  mg/dL Final     Comment:     TRIGLYCERIDE:       Normal              <150 mg/dl       Borderline High    150-199 mg/dl       High               200-499 mg/dl       Very High          >499 mg/dl  _______________________________________       HDL   Date/Time Value Ref Range Status   12/02/2015 11:22 AM 58 mg/dL Final     Comment:     HDL:       High       >59 mg/dl       Low        <41 mg/dl  ______________________________       HDL, Direct   Date/Time Value Ref Range Status   07/07/2023 10:56 AM 57 >=50 mg/dL Final     Comment:     Specimen collection should occur prior to Metamizole administration due to the potential for falsley depressed results. LDL Calculated   Date/Time Value Ref Range Status   07/07/2023 10:56  (H) 0 - 100 mg/dL Final     Comment:     LDL Cholesterol:     Optimal           <100 mg/dl     Near Optimal      100-129 mg/dl     Above Optimal       Borderline High 130-159 mg/dl       High            160-189 mg/dl       Very High       >189 mg/dl         This screening LDL is a calculated result. It does not have the accuracy of the Direct Measured LDL in the monitoring of patients with hyperlipidemia and/or statin therapy.    Direct Measure LDL (JGK522) must be ordered separately in these patients. 12/02/2015 11:22 AM 99 0 - 100 mg/dL Final     Comment:     LDL, CALCULATED:     This screening LDL is a calculated result. It does not have the accuracy of the Direct Measured LDL in the  monitoring of patients with hyperlipidemia and/or statin therapy. Direct Measured LDL (Test Code 3126) must be ordered separately in these  patients.  ______________________________  The above 4 analytes were performed by [facility]  [address] [place] [address]       Hemoglobin   Date/Time Value Ref Range Status   07/07/2023 10:56 AM 15.0 11.5 - 15.4 g/dL Final   12/02/2015 11:22 AM 14.0 11.5 - 15.4 g/dL Final     Hematocrit   Date/Time Value Ref Range Status   07/07/2023 10:56 AM 45.9 34.8 - 46.1 % Final   12/02/2015 11:22 AM 42.6 34.8 - 46.1 % Final     Platelets   Date/Time Value Ref Range Status   07/07/2023 10:56  149 - 390 Thousands/uL Final   12/02/2015 11:22  149 - 390 Thousand/uL Final     TSH 3RD GENERATON   Date/Time Value Ref Range Status   09/27/2023 10:33 AM 1.730 0.450 - 4.500 uIU/mL Final     Comment:     The recommended reference ranges for TSH during pregnancy are as follows:   First trimester 0.1 to 2.5 uIU/mL   Second trimester  0.2 to 3.0 uIU/mL   Third trimester 0.3 to 3.0 uIU/m    Note: Normal ranges may not apply to patients who are transgender, non-binary, or whose legal sex, sex at birth, and gender identity differ. Adult TSH (3rd generation) reference range follows the recommended guidelines of the American Thyroid Association, January, 2020.   12/02/2015 11:22 AM 2.880 0.358 - 3.740 uIU/ML Final     Comment:     *Patients undergoing fluorescein dye angiography may retain small  amounts of fluorescein in the body for 48-72 hours post procedure. Samples containing fluorescein can produce falsely depressed TSH   values. If the patient had this procedure, a specimen should be  resubmitted post fluorescein clearance.   [place] above 1 [place] [facility] [place] [id number]       Free T4   Date/Time Value Ref Range Status   09/27/2023 10:33 AM 1.05 0.61 - 1.12 ng/dL Final     Comment:     Specimens with biotin concentrations > 10 ng/mL can lead to significant (> 10%) positive bias in result. 12/02/2015 11:22 AM 1.13 0.76 - 1.46 ng/dL Final     Comment:     The above 1 analytes were performed by [address] [id number]       Sodium   Date/Time Value Ref Range Status   09/27/2023 10:33  135 - 147 mmol/L Final     BUN   Date/Time Value Ref Range Status   09/27/2023 10:33 AM 21 5 - 25 mg/dL Final   12/02/2015 11:22 AM 17 5 - 25 mg/dL Final     Creatinine   Date/Time Value Ref Range Status   09/27/2023 10:33 AM 0.88 0.60 - 1.30 mg/dL Final     Comment:     Standardized to IDMS reference method   12/02/2015 11:22 AM 0.80 0.60 - 1.30 mg/dL Final     Comment:     Standardized to IDMS reference method      In chart    This note was created with voice recognition software. Phonic, grammatical and spelling errors may be present within the note as a result.

## 2023-11-26 ENCOUNTER — APPOINTMENT (EMERGENCY)
Dept: RADIOLOGY | Facility: HOSPITAL | Age: 83
End: 2023-11-26
Payer: MEDICARE

## 2023-11-26 ENCOUNTER — HOSPITAL ENCOUNTER (OUTPATIENT)
Facility: HOSPITAL | Age: 83
Setting detail: OBSERVATION
Discharge: HOME | End: 2023-11-27
Attending: EMERGENCY MEDICINE | Admitting: INTERNAL MEDICINE
Payer: MEDICARE

## 2023-11-26 DIAGNOSIS — R60.0 BILATERAL EDEMA OF LOWER EXTREMITY: ICD-10-CM

## 2023-11-26 DIAGNOSIS — T14.8XXA WOUND INFECTION: Primary | ICD-10-CM

## 2023-11-26 DIAGNOSIS — L08.9 WOUND INFECTION: Primary | ICD-10-CM

## 2023-11-26 DIAGNOSIS — S81.801A WOUND OF RIGHT LEG, INITIAL ENCOUNTER: ICD-10-CM

## 2023-11-26 PROBLEM — L24.A9 DRAINAGE FROM WOUND: Status: ACTIVE | Noted: 2023-11-26

## 2023-11-26 LAB
ALBUMIN SERPL BCP-MCNC: 4.2 G/DL (ref 3.5–5)
ALP SERPL-CCNC: 99 U/L (ref 34–104)
ALT SERPL W P-5'-P-CCNC: 24 U/L (ref 7–52)
ANION GAP SERPL CALCULATED.3IONS-SCNC: 9 MMOL/L
AST SERPL W P-5'-P-CCNC: 25 U/L (ref 13–39)
BASOPHILS # BLD AUTO: 0.07 THOUSANDS/ÂΜL (ref 0–0.1)
BASOPHILS NFR BLD AUTO: 1 % (ref 0–1)
BILIRUB SERPL-MCNC: 0.4 MG/DL (ref 0.2–1)
BUN SERPL-MCNC: 21 MG/DL (ref 5–25)
CALCIUM SERPL-MCNC: 9.4 MG/DL (ref 8.4–10.2)
CHLORIDE SERPL-SCNC: 101 MMOL/L (ref 96–108)
CO2 SERPL-SCNC: 27 MMOL/L (ref 21–32)
CREAT SERPL-MCNC: 0.8 MG/DL (ref 0.6–1.3)
CRP SERPL QL: 15.1 MG/L
EOSINOPHIL # BLD AUTO: 0.14 THOUSAND/ÂΜL (ref 0–0.61)
EOSINOPHIL NFR BLD AUTO: 1 % (ref 0–6)
ERYTHROCYTE [DISTWIDTH] IN BLOOD BY AUTOMATED COUNT: 12.9 % (ref 11.6–15.1)
ERYTHROCYTE [SEDIMENTATION RATE] IN BLOOD: 39 MM/HOUR (ref 0–29)
GFR SERPL CREATININE-BSD FRML MDRD: 69 ML/MIN/1.73SQ M
GLUCOSE SERPL-MCNC: 90 MG/DL (ref 65–140)
HCT VFR BLD AUTO: 40.4 % (ref 34.8–46.1)
HGB BLD-MCNC: 13.3 G/DL (ref 11.5–15.4)
IMM GRANULOCYTES # BLD AUTO: 0.05 THOUSAND/UL (ref 0–0.2)
IMM GRANULOCYTES NFR BLD AUTO: 1 % (ref 0–2)
LACTATE SERPL-SCNC: 0.9 MMOL/L (ref 0.5–2)
LYMPHOCYTES # BLD AUTO: 2.28 THOUSANDS/ÂΜL (ref 0.6–4.47)
LYMPHOCYTES NFR BLD AUTO: 22 % (ref 14–44)
MCH RBC QN AUTO: 29.7 PG (ref 26.8–34.3)
MCHC RBC AUTO-ENTMCNC: 32.9 G/DL (ref 31.4–37.4)
MCV RBC AUTO: 90 FL (ref 82–98)
MONOCYTES # BLD AUTO: 1.07 THOUSAND/ÂΜL (ref 0.17–1.22)
MONOCYTES NFR BLD AUTO: 10 % (ref 4–12)
NEUTROPHILS # BLD AUTO: 6.81 THOUSANDS/ÂΜL (ref 1.85–7.62)
NEUTS SEG NFR BLD AUTO: 65 % (ref 43–75)
NRBC BLD AUTO-RTO: 0 /100 WBCS
PLATELET # BLD AUTO: 306 THOUSANDS/UL (ref 149–390)
PMV BLD AUTO: 8.8 FL (ref 8.9–12.7)
POTASSIUM SERPL-SCNC: 4 MMOL/L (ref 3.5–5.3)
PROT SERPL-MCNC: 7.8 G/DL (ref 6.4–8.4)
RBC # BLD AUTO: 4.48 MILLION/UL (ref 3.81–5.12)
SODIUM SERPL-SCNC: 137 MMOL/L (ref 135–147)
WBC # BLD AUTO: 10.42 THOUSAND/UL (ref 4.31–10.16)

## 2023-11-26 PROCEDURE — 99284 EMERGENCY DEPT VISIT MOD MDM: CPT

## 2023-11-26 PROCEDURE — 83605 ASSAY OF LACTIC ACID: CPT

## 2023-11-26 PROCEDURE — 99222 1ST HOSP IP/OBS MODERATE 55: CPT | Performed by: INTERNAL MEDICINE

## 2023-11-26 PROCEDURE — 85652 RBC SED RATE AUTOMATED: CPT

## 2023-11-26 PROCEDURE — 85025 COMPLETE CBC W/AUTO DIFF WBC: CPT

## 2023-11-26 PROCEDURE — 86140 C-REACTIVE PROTEIN: CPT

## 2023-11-26 PROCEDURE — 73590 X-RAY EXAM OF LOWER LEG: CPT

## 2023-11-26 PROCEDURE — 87040 BLOOD CULTURE FOR BACTERIA: CPT

## 2023-11-26 PROCEDURE — 99285 EMERGENCY DEPT VISIT HI MDM: CPT

## 2023-11-26 PROCEDURE — 80053 COMPREHEN METABOLIC PANEL: CPT

## 2023-11-26 PROCEDURE — 36415 COLL VENOUS BLD VENIPUNCTURE: CPT

## 2023-11-26 RX ORDER — LABETALOL HYDROCHLORIDE 5 MG/ML
10 INJECTION, SOLUTION INTRAVENOUS EVERY 6 HOURS PRN
Status: DISCONTINUED | OUTPATIENT
Start: 2023-11-26 | End: 2023-11-27 | Stop reason: HOSPADM

## 2023-11-26 RX ORDER — POTASSIUM CHLORIDE 750 MG/1
10 TABLET, EXTENDED RELEASE ORAL DAILY
Status: DISCONTINUED | OUTPATIENT
Start: 2023-11-27 | End: 2023-11-27 | Stop reason: HOSPADM

## 2023-11-26 RX ORDER — ACETAMINOPHEN 325 MG/1
650 TABLET ORAL EVERY 6 HOURS PRN
Status: DISCONTINUED | OUTPATIENT
Start: 2023-11-26 | End: 2023-11-27 | Stop reason: HOSPADM

## 2023-11-26 RX ORDER — TRAZODONE HYDROCHLORIDE 50 MG/1
50 TABLET ORAL
Status: DISCONTINUED | OUTPATIENT
Start: 2023-11-26 | End: 2023-11-27 | Stop reason: HOSPADM

## 2023-11-26 RX ORDER — GABAPENTIN 100 MG/1
100 CAPSULE ORAL
Status: DISCONTINUED | OUTPATIENT
Start: 2023-11-26 | End: 2023-11-27 | Stop reason: HOSPADM

## 2023-11-26 RX ORDER — LISINOPRIL 5 MG/1
5 TABLET ORAL DAILY
Status: DISCONTINUED | OUTPATIENT
Start: 2023-11-27 | End: 2023-11-27 | Stop reason: HOSPADM

## 2023-11-26 RX ORDER — HYDRALAZINE HYDROCHLORIDE 20 MG/ML
10 INJECTION INTRAMUSCULAR; INTRAVENOUS EVERY 6 HOURS PRN
Status: DISCONTINUED | OUTPATIENT
Start: 2023-11-26 | End: 2023-11-27 | Stop reason: HOSPADM

## 2023-11-26 RX ORDER — HYDROMORPHONE HCL IN WATER/PF 6 MG/30 ML
0.2 PATIENT CONTROLLED ANALGESIA SYRINGE INTRAVENOUS EVERY 2 HOUR PRN
Status: DISCONTINUED | OUTPATIENT
Start: 2023-11-26 | End: 2023-11-27 | Stop reason: HOSPADM

## 2023-11-26 RX ORDER — ENOXAPARIN SODIUM 100 MG/ML
40 INJECTION SUBCUTANEOUS DAILY
Status: DISCONTINUED | OUTPATIENT
Start: 2023-11-27 | End: 2023-11-27 | Stop reason: HOSPADM

## 2023-11-26 RX ORDER — FUROSEMIDE 40 MG/1
40 TABLET ORAL DAILY
Status: DISCONTINUED | OUTPATIENT
Start: 2023-11-27 | End: 2023-11-27 | Stop reason: HOSPADM

## 2023-11-26 RX ORDER — PANTOPRAZOLE SODIUM 40 MG/1
40 TABLET, DELAYED RELEASE ORAL
Status: DISCONTINUED | OUTPATIENT
Start: 2023-11-27 | End: 2023-11-27 | Stop reason: HOSPADM

## 2023-11-26 RX ORDER — LEVOTHYROXINE SODIUM 0.07 MG/1
75 TABLET ORAL DAILY
Status: DISCONTINUED | OUTPATIENT
Start: 2023-11-27 | End: 2023-11-27 | Stop reason: HOSPADM

## 2023-11-26 RX ORDER — ONDANSETRON 2 MG/ML
4 INJECTION INTRAMUSCULAR; INTRAVENOUS EVERY 6 HOURS PRN
Status: DISCONTINUED | OUTPATIENT
Start: 2023-11-26 | End: 2023-11-27 | Stop reason: HOSPADM

## 2023-11-26 RX ORDER — OXYCODONE HYDROCHLORIDE 5 MG/1
5 TABLET ORAL EVERY 4 HOURS PRN
Status: DISCONTINUED | OUTPATIENT
Start: 2023-11-26 | End: 2023-11-27 | Stop reason: HOSPADM

## 2023-11-26 RX ADMIN — TRAZODONE HYDROCHLORIDE 50 MG: 50 TABLET ORAL at 21:53

## 2023-11-26 RX ADMIN — SODIUM CHLORIDE, SODIUM LACTATE, POTASSIUM CHLORIDE, AND CALCIUM CHLORIDE 1000 ML: .6; .31; .03; .02 INJECTION, SOLUTION INTRAVENOUS at 19:12

## 2023-11-26 RX ADMIN — GABAPENTIN 100 MG: 100 CAPSULE ORAL at 21:53

## 2023-11-26 NOTE — ED PROVIDER NOTES
History  Chief Complaint   Patient presents with    Wound Check     Pt presents ambulatory stating "I have chronic skin irritation d/t high steroid usage. And I had mohs surgery on my right calf a month ago and would like for the incision to be checked. My skin is very dry and cracked on my legs and I think I need wound care."     Patient is an 28-year-old female with past medical history pression, lumbar stenosis, transient global amnesia presents emergency department for evaluation of a wound check. Patient states approximately 1 month ago she had a Mohs procedure completed on her right medial lower leg. Patient states since the Mohs procedure she had drainage from the area. Patient states approximately 1 week ago she was placed on ciprofloxacin for continued drainage. Patient does state having pain, redness and a burning sensation around the area. Patient does state having redness and burning to bilateral lower extremities after being on a prednisone taper that is being followed up outpatient. Patient states she had her sutures removed from the Mohs incision on 11/22 which is a same-day she completed her ciprofloxacin prescription. Patient states she has had no improvement in the wound or any redness of her legs throughout the course of her ciprofloxacin. Patient states prior to the Cipro she was placed on Doxy and Keflex. Reports a second course of Doxy was also completed at that time. Patient does report having 8 episodes of diarrhea today. Denies fevers, chills, rash, headache, weakness, dizziness, visual changes, abdominal pain, nausea, vomiting, constipation, chest pain, shortness of breath or difficulty breathing. Does not offer any other concerns or complaints. Prior to Admission Medications   Prescriptions Last Dose Informant Patient Reported? Taking?    Dermatological Products, Misc. [facility] EX)   Yes No   Sig: Apply topically   Lumigan 0.01 % ophthalmic drops  Self Yes No brimonidine tartrate 0.2 % ophthalmic solution   Yes No   diphenoxylate-atropine (LOMOTIL) 2.5-0.025 mg per tablet   No No   Sig: Take 1 tablet by mouth 4 (four) times a day as needed for diarrhea   Patient not taking: Reported on 11/1/2023   fexofenadine (ALLEGRA) 180 MG tablet  Self Yes No   Sig: Take 180 mg by mouth daily   furosemide (LASIX) 40 mg tablet   No No   Sig: Take 1 tablet (40 mg total) by mouth daily   levothyroxine 75 mcg tablet   No No   Sig: Take 1 tablet (75 mcg total) by mouth daily   lisinopril (ZESTRIL) 5 mg tablet   No No   Sig: Take 1 tablet (5 mg total) by mouth daily   omeprazole (PriLOSEC) 40 MG capsule   No No   Sig: Take 1 capsule (40 mg total) by mouth daily   potassium chloride (K-DUR,KLOR-CON) 10 mEq tablet   No No   Sig: TAKE 1 TABLET(10 MEQ) BY MOUTH DAILY   traZODone (DESYREL) 50 mg tablet   No No   Sig: Take 1 tablet (50 mg total) by mouth daily at bedtime   triamcinolone (KENALOG) 0.1 % cream   Yes No      Facility-Administered Medications: None       Past Medical History:   Diagnosis Date    Depression 12/30/2021    Lumbar stenosis     Transient global amnesia        Past Surgical History:   Procedure Laterality Date    CATARACT EXTRACTION      MOHS SURGERY Right 10/25/2023    R Leg    OH LIGATION/BIOPSY TEMPORAL ARTERY Left 04/14/2023    Procedure: BIOPSY ARTERY TEMPORAL;  Surgeon: Alistair Engel MD;  Location: [facility];  Service: General       Family History   Problem Relation Age of Onset    Stroke Mother 61        CVA    Pancreatic cancer Father     Hyperlipidemia Sister      I have reviewed and agree with the history as documented. E-Cigarette/Vaping    E-Cigarette Use Never User      E-Cigarette/Vaping Substances    Nicotine No     THC No     CBD No     Flavoring No     Other No     Unknown No      Social History     Tobacco Use    Smoking status: Former    Smokeless tobacco: Never   Vaping Use    Vaping Use: Never used   Substance Use Topics    Alcohol use:  Yes Comment: glass white wine with dinner    Drug use: No       Review of Systems   Constitutional:  Negative for chills and fever. HENT:  Negative for ear pain and sore throat. Eyes:  Negative for pain and visual disturbance. Respiratory:  Negative for cough and shortness of breath. Cardiovascular:  Positive for leg swelling. Negative for chest pain and palpitations. Gastrointestinal:  Positive for diarrhea. Negative for abdominal pain and vomiting. Genitourinary:  Negative for dysuria and hematuria. Musculoskeletal:  Negative for arthralgias and back pain. Bilateral lower extremity redness  Wound to the right medial lower extremity. Drainage from the wound. Pain and burning at the site. Skin:  Negative for color change and rash. Neurological:  Negative for seizures and syncope. All other systems reviewed and are negative. Physical Exam  Physical Exam  Vitals and nursing note reviewed. Constitutional:       General: She is not in acute distress. Appearance: Normal appearance. She is not toxic-appearing or diaphoretic. HENT:      Head: Normocephalic and atraumatic. Right Ear: External ear normal.      Left Ear: External ear normal.      Nose: Nose normal.      Mouth/Throat:      Mouth: Mucous membranes are moist.   Eyes:      General: No scleral icterus. Right eye: No discharge. Left eye: No discharge. Conjunctiva/sclera: Conjunctivae normal.   Pulmonary:      Effort: Pulmonary effort is normal. No respiratory distress. Musculoskeletal:         General: No swelling, deformity or signs of injury. Normal range of motion. Cervical back: Normal range of motion and neck supple. No rigidity. Legs:       Comments: Bilateral lower extremity erythema and warmth   Skin:     General: Skin is warm and dry. Coloration: Skin is not jaundiced. Findings: No erythema or rash. Neurological:      General: No focal deficit present.       Mental Status: She is alert and oriented to person, place, and time. Mental status is at baseline. Cranial Nerves: No cranial nerve deficit. Gait: Gait normal.   Psychiatric:         Mood and Affect: Mood normal.         Behavior: Behavior normal.         Thought Content:  Thought content normal.         Judgment: Judgment normal.         Vital Signs  ED Triage Vitals   Temperature Pulse Respirations Blood Pressure SpO2   11/26/23 1430 11/26/23 1430 11/26/23 1430 11/26/23 1430 11/26/23 1430   97.8 °F (36.6 °C) 100 18 (!) 187/89 98 %      Temp Source Heart Rate Source Patient Position - Orthostatic VS BP Location FiO2 (%)   11/26/23 1430 11/26/23 1430 11/26/23 1430 11/26/23 1430 --   Temporal Monitor Sitting Right arm       Pain Score       11/26/23 2011       No Pain           Vitals:    11/26/23 1912 11/26/23 2011 11/26/23 2038 11/26/23 2216   BP: (!) 189/83 150/98  146/56   Pulse: 91 76 83 78   Patient Position - Orthostatic VS:  Sitting  Sitting         Visual Acuity      ED Medications  Medications   furosemide (LASIX) tablet 40 mg (has no administration in time range)   levothyroxine tablet 75 mcg (has no administration in time range)   lisinopril (ZESTRIL) tablet 5 mg (has no administration in time range)   pantoprazole (PROTONIX) EC tablet 40 mg (has no administration in time range)   potassium chloride (K-DUR,KLOR-CON) CR tablet 10 mEq (has no administration in time range)   traZODone (DESYREL) tablet 50 mg (50 mg Oral Given 11/26/23 2153)   enoxaparin (LOVENOX) subcutaneous injection 40 mg (has no administration in time range)   ondansetron (ZOFRAN) injection 4 mg (has no administration in time range)   acetaminophen (TYLENOL) tablet 650 mg (has no administration in time range)   oxyCODONE (ROXICODONE) split tablet 2.5 mg (has no administration in time range)     Or   oxyCODONE (ROXICODONE) IR tablet 5 mg (has no administration in time range)   HYDROmorphone HCl (DILAUDID) injection 0.2 mg (has no administration in time range)   gabapentin (NEURONTIN) capsule 100 mg (100 mg Oral Given 11/26/23 2153)   hydrALAZINE (APRESOLINE) injection 10 mg (has no administration in time range)   labetalol (NORMODYNE) injection 10 mg (has no administration in time range)   lactated ringers bolus 1,000 mL (1,000 mL Intravenous New Bag 11/26/23 1912)       Diagnostic Studies  Results Reviewed       Procedure Component Value Units Date/Time    Stool Enteric Bacterial Panel by PCR [[id number]]     Lab Status: No result Specimen: Stool     Clostridium difficile toxin by PCR with EIA [[id number]]     Lab Status: No result Specimen: Stool     Lactic acid, plasma (w/reflex if result > 2.0) [[id number]]  (Normal) Collected: 11/26/23 1604    Lab Status: Final result Specimen: Blood from Arm, Right Updated: 11/26/23 1634     LACTIC ACID 0.9 mmol/L     Narrative:      Result may be elevated if tourniquet was used during collection.     Comprehensive metabolic panel [[id number]] Collected: 11/26/23 1604    Lab Status: Final result Specimen: Blood from Arm, Right Updated: 11/26/23 1631     Sodium 137 mmol/L      Potassium 4.0 mmol/L      Chloride 101 mmol/L      CO2 27 mmol/L      ANION GAP 9 mmol/L      BUN 21 mg/dL      Creatinine 0.80 mg/dL      Glucose 90 mg/dL      Calcium 9.4 mg/dL      AST 25 U/L      ALT 24 U/L      Alkaline Phosphatase 99 U/L      Total Protein 7.8 g/dL      Albumin 4.2 g/dL      Total Bilirubin 0.40 mg/dL      eGFR 69 ml/min/1.73sq m     Narrative:      [place] guidelines for Chronic Kidney Disease (CKD):     Stage 1 with normal or high GFR (GFR > 90 mL/min/1.73 square meters)    Stage 2 Mild CKD (GFR = 60-89 mL/min/1.73 square meters)    Stage 3A Moderate CKD (GFR = 45-59 mL/min/1.73 square meters)    Stage 3B Moderate CKD (GFR = 30-44 mL/min/1.73 square meters)    Stage 4 Severe CKD (GFR = 15-29 mL/min/1.73 square meters)    Stage 5 End Stage CKD (GFR <15 mL/min/1.73 square meters)  Note: GFR calculation is accurate only with a steady state creatinine    C-reactive protein [[id number]]  (Abnormal) Collected: 11/26/23 1604    Lab Status: Final result Specimen: Blood from Arm, Right Updated: 11/26/23 1631     CRP 15.1 mg/L     Sedimentation rate, automated [[id number]]  (Abnormal) Collected: 11/26/23 1604    Lab Status: Final result Specimen: Blood from Arm, Right Updated: 11/26/23 1616     Sed Rate 39 mm/hour     CBC and differential [[id number]]  (Abnormal) Collected: 11/26/23 1604    Lab Status: Final result Specimen: Blood from Arm, Right Updated: 11/26/23 1615     WBC 10.42 Thousand/uL      RBC 4.48 Million/uL      Hemoglobin 13.3 g/dL      Hematocrit 40.4 %      MCV 90 fL      MCH 29.7 pg      MCHC 32.9 g/dL      RDW 12.9 %      MPV 8.8 fL      Platelets 954 Thousands/uL      nRBC 0 /100 WBCs      Neutrophils Relative 65 %      Immat GRANS % 1 %      Lymphocytes Relative 22 %      Monocytes Relative 10 %      Eosinophils Relative 1 %      Basophils Relative 1 %      Neutrophils Absolute 6.81 Thousands/µL      Immature Grans Absolute 0.05 Thousand/uL      Lymphocytes Absolute 2.28 Thousands/µL      Monocytes Absolute 1.07 Thousand/µL      Eosinophils Absolute 0.14 Thousand/µL      Basophils Absolute 0.07 Thousands/µL     Blood culture #2 [[id number]] Collected: 11/26/23 1604    Lab Status: In process Specimen: Blood from Arm, Left Updated: 11/26/23 1612    Blood culture #1 [[id number]] Collected: 11/26/23 1604    Lab Status: In process Specimen: Blood from Arm, Right Updated: 11/26/23 1612                   XR tibia fibula 2 views RIGHT    (Results Pending)              Procedures  Procedures         ED Course             SBIRT 20yo+      Flowsheet Row Most Recent Value   Initial Alcohol Screen: US AUDIT-C     1. How often do you have a drink containing alcohol? 1 Filed at: 11/26/2023 1433   2. How many drinks containing alcohol do you have on a typical day you are drinking?   1 Filed at: 11/26/2023 1433   3b. FEMALE Any Age, or MALE 65+: How often do you have 4 or more drinks on one occassion? 0 Filed at: 11/26/2023 1433   Audit-C Score 2 Filed at: 11/26/2023 1432   AGUSTIN: How many times in the past year have you. .. Used an illegal drug or used a prescription medication for non-medical reasons? Never Filed at: 11/26/2023 1433                      Medical Decision Making    This is a 82-year-old female present to the emergency room for evaluation of a wound check. Patient states she had a Mohs procedure done approximately 1 month ago on her right medial calf. Patient states she has had drainage from the area since the procedure. Patient states she was placed on a course of ciprofloxacin 1 week ago that was finished on 11/22 the day her sutures were removed. Patient states she has pain, burning, redness and drainage from the site. Patient does state bilateral lower extremity redness of the lower legs due to a prednisone course secondary to giant cell arteritis. Patient states that is being followed outpatient. Patient states he does have polymyalgia rheumatica and giant cell arteritis. Patient is in no acute distress, elevated blood pressure on initial examination, otherwise stable vital signs. Differential diagnosis to include but is not limited to: Cellulitis, wound, osteomyelitis    Initial ED Plan: Imaging, labs    ED results:  Xray images tibia/fibula independently visualized and interpreted by me - no acute fracture or dislocation or gas/air    Final ED assessment: Patient is stable and well appearing. Discussed radiologic studies and laboratory results. Patient verbalized understanding and is agreeable with the plan for admission. Discussed with Dr. Dilma Sampson, observation, bridging orders placed. Amount and/or Complexity of Data Reviewed  Labs: ordered. Risk  Decision regarding hospitalization.              Disposition  Final diagnoses:   Wound infection     Time reflects when diagnosis was documented in both MDM as applicable and the Disposition within this note       Time User Action Codes Description Comment    11/26/2023  7:06 PM Alethea Meigs. [id number],  L08.9] Wound infection           ED Disposition       ED Disposition   Admit    Condition   Stable    Date/Time   Sun Nov 26, 2023 1906    Comment   Case was discussed with Dr. Rosa Maria Black and the patient's admission status was agreed to be Admission Status: observation status to the service of Dr. Rosa Maria Black . Follow-up Information    None         Current Discharge Medication List        CONTINUE these medications which have NOT CHANGED    Details   brimonidine tartrate 0.2 % ophthalmic solution       Dermatological Products, Misc. (EPICERAM EX) Apply topically      diphenoxylate-atropine (LOMOTIL) 2.5-0.025 mg per tablet Take 1 tablet by mouth 4 (four) times a day as needed for diarrhea  Qty: 60 tablet, Refills: 1    Associated Diagnoses: Diarrhea, unspecified type      fexofenadine (ALLEGRA) 180 MG tablet Take 180 mg by mouth daily      furosemide (LASIX) 40 mg tablet Take 1 tablet (40 mg total) by mouth daily  Qty: 30 tablet, Refills: 1    Associated Diagnoses: Bilateral edema of lower extremity      levothyroxine 75 mcg tablet Take 1 tablet (75 mcg total) by mouth daily  Qty: 90 tablet, Refills: 3    Associated Diagnoses: Hypothyroidism, unspecified type      lisinopril (ZESTRIL) 5 mg tablet Take 1 tablet (5 mg total) by mouth daily  Qty: 90 tablet, Refills: 3    Associated Diagnoses: Essential hypertension      Lumigan 0.01 % ophthalmic drops       omeprazole (PriLOSEC) 40 MG capsule Take 1 capsule (40 mg total) by mouth daily  Qty: 90 capsule, Refills: 1    Associated Diagnoses: Temporal arteritis syndrome ([address]); 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     PDMP Review         Value Time User    PDMP Reviewed  Yes 11/26/2023  6:56 PM Addy Porras DO            ED Provider  Electronically Signed by             Nurys Hanley PA-C  11/26/23 2588

## 2023-11-27 ENCOUNTER — TELEPHONE (OUTPATIENT)
Dept: WOUND CARE | Facility: CLINIC | Age: 83
End: 2023-11-27

## 2023-11-27 ENCOUNTER — TRANSITIONAL CARE MANAGEMENT (OUTPATIENT)
Dept: INTERNAL MEDICINE CLINIC | Facility: CLINIC | Age: 83
End: 2023-11-27

## 2023-11-27 VITALS
HEIGHT: 62 IN | TEMPERATURE: 97.6 F | HEART RATE: 111 BPM | SYSTOLIC BLOOD PRESSURE: 159 MMHG | DIASTOLIC BLOOD PRESSURE: 71 MMHG | BODY MASS INDEX: 24.14 KG/M2 | OXYGEN SATURATION: 95 % | WEIGHT: 131.17 LBS | RESPIRATION RATE: 20 BRPM

## 2023-11-27 LAB
ALBUMIN SERPL BCP-MCNC: 3.6 G/DL (ref 3.5–5)
ALP SERPL-CCNC: 80 U/L (ref 34–104)
ALT SERPL W P-5'-P-CCNC: 17 U/L (ref 7–52)
ANION GAP SERPL CALCULATED.3IONS-SCNC: 6 MMOL/L
AST SERPL W P-5'-P-CCNC: 19 U/L (ref 13–39)
BASOPHILS # BLD AUTO: 0.07 THOUSANDS/ÂΜL (ref 0–0.1)
BASOPHILS NFR BLD AUTO: 1 % (ref 0–1)
BILIRUB SERPL-MCNC: 0.51 MG/DL (ref 0.2–1)
BUN SERPL-MCNC: 16 MG/DL (ref 5–25)
CALCIUM SERPL-MCNC: 9.1 MG/DL (ref 8.4–10.2)
CHLORIDE SERPL-SCNC: 105 MMOL/L (ref 96–108)
CO2 SERPL-SCNC: 29 MMOL/L (ref 21–32)
CREAT SERPL-MCNC: 0.68 MG/DL (ref 0.6–1.3)
EOSINOPHIL # BLD AUTO: 0.27 THOUSAND/ÂΜL (ref 0–0.61)
EOSINOPHIL NFR BLD AUTO: 3 % (ref 0–6)
ERYTHROCYTE [DISTWIDTH] IN BLOOD BY AUTOMATED COUNT: 13.1 % (ref 11.6–15.1)
GFR SERPL CREATININE-BSD FRML MDRD: 82 ML/MIN/1.73SQ M
GLUCOSE P FAST SERPL-MCNC: 96 MG/DL (ref 65–99)
GLUCOSE SERPL-MCNC: 96 MG/DL (ref 65–140)
HCT VFR BLD AUTO: 34.4 % (ref 34.8–46.1)
HGB BLD-MCNC: 11.3 G/DL (ref 11.5–15.4)
IMM GRANULOCYTES # BLD AUTO: 0.04 THOUSAND/UL (ref 0–0.2)
IMM GRANULOCYTES NFR BLD AUTO: 1 % (ref 0–2)
LYMPHOCYTES # BLD AUTO: 1.5 THOUSANDS/ÂΜL (ref 0.6–4.47)
LYMPHOCYTES NFR BLD AUTO: 19 % (ref 14–44)
MAGNESIUM SERPL-MCNC: 1.9 MG/DL (ref 1.9–2.7)
MCH RBC QN AUTO: 29.8 PG (ref 26.8–34.3)
MCHC RBC AUTO-ENTMCNC: 32.8 G/DL (ref 31.4–37.4)
MCV RBC AUTO: 91 FL (ref 82–98)
MONOCYTES # BLD AUTO: 1.02 THOUSAND/ÂΜL (ref 0.17–1.22)
MONOCYTES NFR BLD AUTO: 13 % (ref 4–12)
NEUTROPHILS # BLD AUTO: 5.12 THOUSANDS/ÂΜL (ref 1.85–7.62)
NEUTS SEG NFR BLD AUTO: 63 % (ref 43–75)
NRBC BLD AUTO-RTO: 0 /100 WBCS
PLATELET # BLD AUTO: 255 THOUSANDS/UL (ref 149–390)
PMV BLD AUTO: 8.9 FL (ref 8.9–12.7)
POTASSIUM SERPL-SCNC: 3.7 MMOL/L (ref 3.5–5.3)
PROT SERPL-MCNC: 6.1 G/DL (ref 6.4–8.4)
RBC # BLD AUTO: 3.79 MILLION/UL (ref 3.81–5.12)
SODIUM SERPL-SCNC: 140 MMOL/L (ref 135–147)
WBC # BLD AUTO: 8.02 THOUSAND/UL (ref 4.31–10.16)

## 2023-11-27 PROCEDURE — 85025 COMPLETE CBC W/AUTO DIFF WBC: CPT | Performed by: INTERNAL MEDICINE

## 2023-11-27 PROCEDURE — 80053 COMPREHEN METABOLIC PANEL: CPT | Performed by: INTERNAL MEDICINE

## 2023-11-27 PROCEDURE — 83735 ASSAY OF MAGNESIUM: CPT | Performed by: INTERNAL MEDICINE

## 2023-11-27 PROCEDURE — 87505 NFCT AGENT DETECTION GI: CPT | Performed by: INTERNAL MEDICINE

## 2023-11-27 PROCEDURE — 99239 HOSP IP/OBS DSCHRG MGMT >30: CPT | Performed by: INTERNAL MEDICINE

## 2023-11-27 RX ORDER — GABAPENTIN 100 MG/1
100 CAPSULE ORAL
Qty: 30 CAPSULE | Refills: 0 | Status: SHIPPED | OUTPATIENT
Start: 2023-11-27

## 2023-11-27 RX ORDER — FUROSEMIDE 20 MG/1
20 TABLET ORAL EVERY OTHER DAY
Qty: 15 TABLET | Refills: 0 | Status: SHIPPED | OUTPATIENT
Start: 2023-11-27 | End: 2023-12-05 | Stop reason: ALTCHOICE

## 2023-11-27 RX ADMIN — Medication 2.5 MG: at 08:25

## 2023-11-27 RX ADMIN — PANTOPRAZOLE SODIUM 40 MG: 40 TABLET, DELAYED RELEASE ORAL at 05:21

## 2023-11-27 RX ADMIN — LISINOPRIL 5 MG: 5 TABLET ORAL at 08:25

## 2023-11-27 RX ADMIN — LEVOTHYROXINE SODIUM 75 MCG: 75 TABLET ORAL at 08:25

## 2023-11-27 NOTE — TELEPHONE ENCOUNTER
Returned patient's voicemail regarding wound care. Patient stated she was looking for VNA assistance and they were able to assist her in the hospital prior to discharge. Decline out patient appt stating that was not what she was looking for.

## 2023-11-27 NOTE — DISCHARGE SUMMARY
[address]  Discharge- Julio C Meredith 1940, 82 y.o. female MRN: [id number]  Unit/Bed#: -01 Encounter: [id number]  Primary Care Provider: Jaclyn Jaramillo MD   Date and time admitted to hospital: 11/26/2023  3:01 PM    Discharge Diagnosis:    Right lower extremity wound  Dependent erythema  Chronic skin changes  Depression  Hypothyroidism  Hypertension  Hyperlipidemia  History of giant cell arteritis previously on steroid therapy  PMR    Discharging Physician / Practitioner: Sonja South MD  PCP: Jaclyn Jaramillo MD  Admission Date:   Admission Orders (From admission, onward)       Ordered        11/26/23 1859  Place in Observation  Once                          Discharge Date: 11/27/23    Consultations During Hospital Stay:  Wound Care     Outpatient follow up Requested:  PCP    Complications:  None    Reason for Admission: RLE wound    HPI:  Julio C Meredith is a 82 y.o. female who Presented for wound check of right medial calf     PMH, HTN, anxiety/depression, GERD, hypothyroidism,, polymyalgia rheumatica, temporal arteritis     Recently had Mohs procedure in that area approximately 4 weeks prior and had sutures removed on 11/22. Reported drainage from the area along with associated redness and pain. Was initiated on ciprofloxacin and also completed multiple regimen of antibiotics prior. Reports continued drainage along with burning sensation and pain with increased redness beyond her baseline redness. Of note, she also has baseline redness and lower extremity swelling due to recent use of steroids. Due to persistent pain and burning sensation, admitted for overnight observation and monitoring along with wound care. Hospital Course: The patient was hospitalized  Her wound was not currently infected as evidenced by normal temperature, normal WBC count without bandemia.   I do suspect that the erythema in bilateral lower extremities is largely chronic at this point. Do not think antibiotic therapy would be helpful. Given her chronic rashes outpatient follow-up with dermatology seems to be appropriate. The patient already has a dermatologist who she sees. In regards to her chronic wounds she was seen by wound care who is arranging wound care plan follow-up. Patient otherwise is stable and since she wants to go home she is being discharged home as per her request.  She should continue follow-up outpatient with primary care physician as well as dermatology and wound care. Condition at Discharge: good     Discharge Day Visit / Exam:     Subjective:    Patient valuated this morning. She wishes to go home. She does mention some burning pain and sensation in her extremities which she attributes to her rash although this appears to be somewhat neuropathic in nature. Vitals: Blood Pressure: 159/71 (11/27/23 0737)  Pulse: (!) 111 (11/27/23 0737)  Temperature: 97.6 °F (36.4 °C) (11/27/23 0737)  Temp Source: Temporal (11/26/23 2216)  Respirations: 20 (11/27/23 0737)  Height: 5' 2" (157.5 cm) (11/26/23 2011)  Weight - Scale: 59.5 kg (131 lb 2.8 oz) (11/26/23 2011)  SpO2: 95 % (11/27/23 0737)    Exam:   Physical Exam  Vitals and nursing note reviewed. Constitutional:       Appearance: Normal appearance. She is normal weight. Comments: Female in bed, awake   HENT:      Head: Normocephalic and atraumatic. Right Ear: External ear normal.      Left Ear: External ear normal.      Nose: Nose normal. No congestion. Mouth/Throat:      Mouth: Mucous membranes are moist.      Pharynx: Oropharynx is clear. No oropharyngeal exudate or posterior oropharyngeal erythema. Eyes:      General: No scleral icterus. Right eye: No discharge. Left eye: No discharge. Extraocular Movements: Extraocular movements intact. Conjunctiva/sclera: Conjunctivae normal.      Pupils: Pupils are equal, round, and reactive to light.    Cardiovascular:      Rate and Rhythm: Normal rate and regular rhythm. Pulses: Normal pulses. Heart sounds: Normal heart sounds. No murmur heard. No friction rub. No gallop. Pulmonary:      Effort: Pulmonary effort is normal. No respiratory distress. Breath sounds: Normal breath sounds. No stridor. No wheezing, rhonchi or rales. Chest:      Chest wall: No tenderness. Abdominal:      General: Abdomen is flat. Bowel sounds are normal. There is no distension. Palpations: Abdomen is soft. There is no mass. Tenderness: There is no abdominal tenderness. There is no guarding or rebound. Musculoskeletal:         General: No swelling, tenderness, deformity or signs of injury. Normal range of motion. Cervical back: Normal range of motion and neck supple. No rigidity. No muscular tenderness. Skin:     General: Skin is warm and dry. Capillary Refill: Capillary refill takes less than 2 seconds. Coloration: Skin is not jaundiced or pale. Findings: Erythema present. No bruising, lesion or rash. Comments: Erythema in bilateral lower extremities extending from below the knee all the way to the ankle. The right lower extremity does have a circular wound without any purulent drainage with good granulation tissue in the base. Neurological:      General: No focal deficit present. Mental Status: She is alert and oriented to person, place, and time. Mental status is at baseline. Cranial Nerves: No cranial nerve deficit. Sensory: No sensory deficit. Motor: No weakness. Coordination: Coordination normal.   Psychiatric:         Mood and Affect: Mood normal.         Behavior: Behavior normal.         Thought Content:  Thought content normal.         Judgment: Judgment normal.           Discussion with Family: Patient herself, she did not request me to talk to anyone    Discharge instructions/Information to patient and family:   See after visit summary for information provided to patient and family. Provisions for Follow-Up Care:  See after visit summary for information related to follow-up care and any pertinent home health orders. Disposition:     Home with VNA Services (Reminder: Complete face to face encounter)    For Discharges to [facility]:   Not Applicable to this Patient - Not Applicable to this Patient    Planned Readmission: No     Discharge Statement:  I spent 91 minutes discharging the patient. This time was spent on the day of discharge. I had direct contact with the patient on the day of discharge. Greater than 50% of the total time was spent examining patient, answering all patient questions, arranging and discussing plan of care with patient as well as directly providing post-discharge instructions. Additional time then spent on discharge activities. Discharge Medications:  See after visit summary for reconciled discharge medications provided to patient and family.       ** Please Note: This note has been constructed using a voice recognition system **

## 2023-11-27 NOTE — WOUND OSTOMY CARE
Progress Note - Wound   Carlos Tsai 82 y.o. female MRN: [id number]  Unit/Bed#: -01 Encounter: [id number]      Assessment:   Patient admitted due to wound drainage. History of HTN, HLD. Wound are consulted for right lower extremity wound. Patient agreeable to assessment, alert and oriented x4, continent of bowel and bladder, independently turns while in bed, is independent with most care. Primary RN and SLIM made aware of assessment. Recommend out-patient referral to wound center. Follows with out-patient dermatology for generalized rash. 1. Patient denies assessment of sacrum and buttock- states skin is dry, intact, no wounds, no rash, no pain. 2. Bilateral heels- skin is dry, intact, scaly, blanchable. 3. Right anterior tibial- Patient states wound is due to MOHS procedure about 1 month ago. Wound is oval in shape, full-thickness, true depth unknown, approx. 60% yellow adhered slough and tissue and 40% pink tissue, with scant amount of serosanguineous drainage noted. Gissell-wound is dry, intact, with red rash noted to bilateral lower extremities, mild warmth that patient states is chronic, no induration. Educated patient on importance of frequent offloading of pressure via turning, repositioning, and weight-shifting. Verbalized understanding of plan of care. No induration, fluctuance, odor, or purulence noted to the above noted wound. New dressing applied. Patient tolerated well, reports mild pain to the wound. Primary nurse aware of plan of care. See flow sheets for more detailed assessment findings. Will follow along. Skin care plans:  1-Hydraguard to bilateral sacrum, buttock and heels BID and PRN  2-Elevate heels to offload pressure. 3-Ehob cushion in chair when out of bed. 4-Moisturize skin daily with skin nourishing cream.  5-Turn/reposition q2h for pressure re-distribution on skin. 6-R leg wound- Cleanse with NSS, pat dry.  Apply Silvasorb gel over wound bed, cover with Adaptic to keep gel in place, then apply 4x4, wrap with Kortney. Change daily and as needed for soilage/displacement. Wound 11/27/23 Pretibial Right (Active)   Wound Image   11/27/23 1055   Wound Description Yellow;Slough;Pink 11/27/23 1055   Gissell-wound Assessment Dry; Intact; Rash 11/27/23 1055   Wound Length (cm) 2 cm 11/27/23 1055   Wound Width (cm) 1.8 cm 11/27/23 1055   Wound Depth (cm) 0.2 cm 11/27/23 1055   Wound Surface Area (cm^2) 3.6 cm^2 11/27/23 1055   Wound Volume (cm^3) 0.72 cm^3 11/27/23 1055   Calculated Wound Volume (cm^3) 0.72 cm^3 11/27/23 1055   Tunneling 0 cm 11/27/23 1055   Undermining 0 11/27/23 1055   Drainage Amount Scant 11/27/23 1055   Drainage Description Serosanguineous 11/27/23 1055   Non-staged Wound Description Full thickness 11/27/23 1055   Treatments Cleansed;Irrigation with NSS;Site care 11/27/23 1055   Dressing Silvasorb gel;ABD 11/27/23 1055   Wound packed? No 11/27/23 1055   Dressing Changed Changed 11/27/23 1055   Patient Tolerance Tolerated well 11/27/23 1055   Dressing Status Clean;Dry; Intact 11/27/23 1055       Call or tigertext with any questions  Wound Care will continue to follow    Warden Ryanne BECKWITHN RN CWON  Wound and Ostomy care

## 2023-11-27 NOTE — ASSESSMENT & PLAN NOTE
Lab Results   Component Value Date    CHOLESTEROL 231 (H) 07/07/2023    TRIG 135 07/07/2023    HDL 57 07/07/2023    LDLCALC 147 (H) 07/07/2023       Statin

## 2023-11-27 NOTE — ASSESSMENT & PLAN NOTE
Lab Results   Component Value Date    HXC2CJYIAVBE 1.730 09/27/2023    FREET4 1.05 09/27/2023     Continue home meds

## 2023-11-27 NOTE — PROGRESS NOTES
Patient:    MRN:  [id number]    Benjamin Request ID:  [id number]    Level of care reserved:  Katie5 Joshua Gomez    Partner Reserved:  [facility], [address] [place] [address] [phone]    Clinical needs requested:    Geography searched:  [phone]    Start of Service:    Request sent:  12:28pm EST on 11/27/2023 by Jamie Hernandez    Partner reserved:  12:46pm EST on 11/27/2023 by Jamie Hernandez    Choice list shared:  12:40pm EST on 11/27/2023 by Jamie Hernandez

## 2023-11-27 NOTE — PLAN OF CARE
Problem: PAIN - ADULT  Goal: Verbalizes/displays adequate comfort level or baseline comfort level  Description: Interventions:  - Encourage patient to monitor pain and request assistance  - Assess pain using appropriate pain scale  - Administer analgesics based on type and severity of pain and evaluate response  - Implement non-pharmacological measures as appropriate and evaluate response  - Consider cultural and social influences on pain and pain management  - Notify physician/advanced practitioner if interventions unsuccessful or patient reports new pain  Outcome: Progressing     Problem: INFECTION - ADULT  Goal: Absence or prevention of progression during hospitalization  Description: INTERVENTIONS:  - Assess and monitor for signs and symptoms of infection  - Monitor lab/diagnostic results  - Monitor all insertion sites, i.e. indwelling lines, tubes, and drains  - Monitor endotracheal if appropriate and nasal secretions for changes in amount and color  - [place] appropriate cooling/warming therapies per order  - Administer medications as ordered  - Instruct and encourage patient and family to use good hand hygiene technique  - Identify and instruct in appropriate isolation precautions for identified infection/condition  Outcome: Progressing  Goal: Absence of fever/infection during neutropenic period  Description: INTERVENTIONS:  - Monitor WBC    Outcome: Progressing     Problem: SAFETY ADULT  Goal: Patient will remain free of falls  Description: INTERVENTIONS:  - Educate patient/family on patient safety including physical limitations  - Instruct patient to call for assistance with activity   - Consult OT/PT to assist with strengthening/mobility   - Keep Call bell within reach  - Keep bed low and locked with side rails adjusted as appropriate  - Keep care items and personal belongings within reach  - Initiate and maintain comfort rounds  - Make Fall Risk Sign visible to staff  - Offer Toileting every 2 Hours, in advance of need  - Initiate/Maintain bed alarm  - Obtain necessary fall risk management equipment  - Apply yellow socks and bracelet for high fall risk patients  - Consider moving patient to room near nurses station  Outcome: Progressing  Goal: Maintain or return to baseline ADL function  Description: INTERVENTIONS:  -  Assess patient's ability to carry out ADLs; assess patient's baseline for ADL function and identify physical deficits which impact ability to perform ADLs (bathing, care of mouth/teeth, toileting, grooming, dressing, etc.)  - Assess/evaluate cause of self-care deficits   - Assess range of motion  - Assess patient's mobility; develop plan if impaired  - Assess patient's need for assistive devices and provide as appropriate  - Encourage maximum independence but intervene and supervise when necessary  - Involve family in performance of ADLs  - Assess for home care needs following discharge   - Consider OT consult to assist with ADL evaluation and planning for discharge  - Provide patient education as appropriate  Outcome: Progressing  Goal: Maintains/Returns to pre admission functional level  Description: INTERVENTIONS:  - Perform AM-PAC 6 Click Basic Mobility/ Daily Activity assessment daily.  - Set and communicate daily mobility goal to care team and patient/family/caregiver. - Collaborate with rehabilitation services on mobility goals if consulted  - Perform Range of Motion 3 times a day. - Reposition patient every 2 hours.   - Dangle patient 3 times a day  - Stand patient 3 times a day  - Ambulate patient 3 times a day  - Out of bed to chair 3 times a day   - Out of bed for meals 3 times a day  - Out of bed for toileting  - Record patient progress and toleration of activity level   Outcome: Progressing     Problem: Knowledge Deficit  Goal: Patient/family/caregiver demonstrates understanding of disease process, treatment plan, medications, and discharge instructions  Description: Complete learning assessment and assess knowledge base.   Interventions:  - Provide teaching at level of understanding  - Provide teaching via preferred learning methods  Outcome: Progressing     Problem: Prexisting or High Potential for Compromised Skin Integrity  Goal: Skin integrity is maintained or improved  Description: INTERVENTIONS:  - Identify patients at risk for skin breakdown  - Assess and monitor skin integrity  - Assess and monitor nutrition and hydration status  - Monitor labs   - Assess for incontinence   - Turn and reposition patient  - Assist with mobility/ambulation  - Relieve pressure over bony prominences  - Avoid friction and shearing  - Provide appropriate hygiene as needed including keeping skin clean and dry  - Evaluate need for skin moisturizer/barrier cream  - Collaborate with interdisciplinary team   - Patient/family teaching  - Consider wound care consult   Outcome: Progressing no

## 2023-11-27 NOTE — H&P
[address] Gomez  H&P  Name: Carlos Tsai 82 y.o. female I MRN: [id number]  Unit/Bed#: -01 I Date of Admission: 11/26/2023   Date of Service: 11/26/2023 I Hospital Day: 0      Assessment/Plan   * Drainage from wound  Assessment & Plan  Recently underwent Mohs procedure a month prior  with sutures removed 11/22  Wound appears to be draining and there is mild erythema surrounding the lesion  Patient already completed multiple doses of antibiotics but continues to have burning sensation  Mild WBC elevation noted, though could be attributed to chronic use of steroids    Recent Labs     11/26/23  1604   WBC 10.42*       Consult wound care  Gabapentin 100 mg  Overnight IV Fluid hydration  Monitor WBC/fever curve and monitor off antibiotics; if stable, can discharge in AM    Current moderate episode of major depressive disorder without prior episode [facility])  Assessment & Plan  Continue home meds    Acquired hypothyroidism  Assessment & Plan  Lab Results   Component Value Date    [id number] 1.730 09/27/2023    FREET4 1.05 09/27/2023     Continue home meds    Hypertension, benign  Assessment & Plan  Blood Pressure: (!) 187/89    Plan:  Continue home meds  Monitor blood pressure  PRN IV Labetalol and Hydralazine for SBP > 160      Mixed hyperlipidemia  Assessment & Plan  Lab Results   Component Value Date    CHOLESTEROL 231 (H) 07/07/2023    TRIG 135 07/07/2023    HDL 57 07/07/2023    LDLCALC 147 (H) 07/07/2023       Statin           VTE Pharmacologic Prophylaxis: VTE Score: 3 Moderate Risk (Score 3-4) - Pharmacological DVT Prophylaxis Ordered: enoxaparin (Lovenox). Code Status: Level 1 - Full Code  Discussion with Patient/Family: patient    Anticipated Length of Stay: Patient will be admitted on an observation basis with an anticipated length of stay of less than 2 midnights secondary to wound drainage.     Total Time for Visit, including Counseling / Coordination of Care: 85 minutes Greater than 50% of this total time spent on direct patient counseling and coordination of care. Chief Complaint:   Chief Complaint   Patient presents with    Wound Check     Pt presents ambulatory stating "I have chronic skin irritation d/t high steroid usage. And I had mohs surgery on my right calf a month ago and would like for the incision to be checked. My skin is very dry and cracked on my legs and I think I need wound care."       History of Present Illness:  Caitlin Sullivan is a 82 y.o. female who Presented for wound check of right medial calf    PMH, HTN, anxiety/depression, GERD, hypothyroidism,, polymyalgia rheumatica, temporal arteritis    Recently had Mohs procedure in that area approximately 4 weeks prior and had sutures removed on 11/22. Reported drainage from the area along with associated redness and pain. Was initiated on ciprofloxacin and also completed multiple regimen of antibiotics prior. Reports continued drainage along with burning sensation and pain with increased redness beyond her baseline redness. Of note, she also has baseline redness and lower extremity swelling due to recent use of steroids. Due to persistent pain and burning sensation, admitted for overnight observation and monitoring along with wound care. Review of Systems:  A 10-point review of systems was obtained. Pertinent positives and negatives are outlined in the HPI above. Remainder of review of systems are otherwise negative.      Past Medical and Surgical History:   Past Medical History:   Diagnosis Date    Depression 12/30/2021    Lumbar stenosis     Transient global amnesia        Past Surgical History:   Procedure Laterality Date    CATARACT EXTRACTION      MOHS SURGERY Right 10/25/2023    R Leg    AL LIGATION/BIOPSY TEMPORAL ARTERY Left 04/14/2023    Procedure: BIOPSY ARTERY TEMPORAL;  Surgeon: Hildy Barthel, MD;  Location: [facility] OR;  Service: General       Meds/Allergies:  Prior to Admission medications Medication Sig Start Date End Date Taking? Authorizing Provider   brimonidine tartrate 0.2 % ophthalmic solution  10/11/23   Historical Provider, MD   Dermatological Products, [facility]. ([address]) Apply topically    Historical Provider, MD   diphenoxylate-atropine (LOMOTIL) 2.5-0.025 mg per tablet Take 1 tablet by mouth 4 (four) times a day as needed for diarrhea  Patient not taking: Reported on 11/1/2023 5/19/23   Kevin Limon MD   fexofenadine (ALLEGRA) 180 MG tablet Take 180 mg by mouth daily    Historical Provider, MD   furosemide (LASIX) 40 mg tablet Take 1 tablet (40 mg total) by mouth daily 9/18/23   Kevin Limon MD   levothyroxine 75 mcg tablet Take 1 tablet (75 mcg total) by mouth daily 8/11/23   Kevin Limon MD   lisinopril (ZESTRIL) 5 mg tablet Take 1 tablet (5 mg total) by mouth daily 8/11/23   Kevin Limon MD   Lumigan 0.01 % ophthalmic drops  6/5/20   Historical Provider, MD   omeprazole (PriLOSEC) 40 MG capsule Take 1 capsule (40 mg total) by mouth daily 7/26/23   Kevin Limon MD   potassium chloride (K-DUR,KLOR-CON) 10 mEq tablet TAKE 1 TABLET(10 MEQ) BY MOUTH DAILY 9/18/23   Kevin Limon MD   traZODone (DESYREL) 50 mg tablet Take 1 tablet (50 mg total) by mouth daily at bedtime 8/2/23   Kevin Limon MD   triamcinolone (KENALOG) 0.1 % cream  9/26/23   Historical Provider, MD     I have reviewed home medications with patient personally. Allergies:    Allergies   Allergen Reactions    Sulfa Antibiotics Hives    Latex Rash       Social History:  Marital Status: /Civil Union   Patient Pre-hospital Living Situation: Home  Patient Pre-hospital Level of Mobility: walks  Patient Pre-hospital Diet Restrictions: none  Substance Use History:   Social History     Substance and Sexual Activity   Alcohol Use Yes    Comment: glass white wine with dinner     Social History     Tobacco Use   Smoking Status Former   Smokeless Tobacco Never     Social History     Substance and Sexual Activity   Drug Use No Family History:  Family History   Problem Relation Age of Onset    Stroke Mother 61        CVA    Pancreatic cancer Father     Hyperlipidemia Sister        Physical Exam:     Vitals:   Blood Pressure: 150/98 (11/26/23 2011)  Pulse: 83 (11/26/23 2038)  Temperature: 97.5 °F (36.4 °C) (11/26/23 2011)  Temp Source: Temporal (11/26/23 1430)  Respirations: 18 (11/26/23 2011)  Height: 5' 2" (157.5 cm) (11/26/23 2011)  Weight - Scale: 59.5 kg (131 lb 2.8 oz) (11/26/23 2011)  SpO2: 95 % (11/26/23 2011)    Physical Exam  Vitals and nursing note reviewed. Constitutional:       General: She is not in acute distress. Appearance: She is ill-appearing. She is not toxic-appearing. HENT:      Head: Normocephalic. Nose: Nose normal.      Mouth/Throat:      Mouth: Mucous membranes are dry. Eyes:      General: No scleral icterus. Conjunctiva/sclera: Conjunctivae normal.   Cardiovascular:      Rate and Rhythm: Normal rate. Pulses: Normal pulses. Radial pulses are 2+ on the right side and 2+ on the left side. Heart sounds: Normal heart sounds. Pulmonary:      Effort: Pulmonary effort is normal.      Breath sounds: Normal breath sounds. Abdominal:      General: Bowel sounds are normal. There is no distension. Palpations: Abdomen is soft. Tenderness: There is no abdominal tenderness. Musculoskeletal:         General: No tenderness. Skin:     General: Skin is dry. Coloration: Skin is not jaundiced. Neurological:      Mental Status: She is alert. Psychiatric:         Mood and Affect: Mood normal.         Behavior: Behavior normal. Behavior is cooperative.           Additional Data:     Lab Results:  Results from last 7 days   Lab Units 11/26/23  1604   WBC Thousand/uL 10.42*   HEMOGLOBIN g/dL 13.3   HEMATOCRIT % 40.4   PLATELETS Thousands/uL 306   NEUTROS PCT % 65   LYMPHS PCT % 22   MONOS PCT % 10   EOS PCT % 1     Results from last 7 days   Lab Units 11/26/23  1604 SODIUM mmol/L 137   POTASSIUM mmol/L 4.0   CHLORIDE mmol/L 101   CO2 mmol/L 27   BUN mg/dL 21   CREATININE mg/dL 0.80   ANION GAP mmol/L 9   CALCIUM mg/dL 9.4   ALBUMIN g/dL 4.2   TOTAL BILIRUBIN mg/dL 0.40   ALK PHOS U/L 99   ALT U/L 24   AST U/L 25   GLUCOSE RANDOM mg/dL 90                 Results from last 7 days   Lab Units 11/26/23  1604   LACTIC ACID mmol/L 0.9       Imaging Results Reviewed as noted below  XR tibia fibula 2 views RIGHT    (Results Pending)       No results found. No Chest XR results available for this patient. EKG and Other Studies Reviewed on Admission:   EKG: no EKG available    No results for input(s): "VENTRATE" in the last 72 hours. ** Please Note: This note has been constructed using a voice recognition system.  **

## 2023-11-27 NOTE — ASSESSMENT & PLAN NOTE
Recently underwent Mohs procedure a month prior  with sutures removed 11/22  Wound appears to be draining and there is mild erythema surrounding the lesion  Patient already completed multiple doses of antibiotics but continues to have burning sensation  Mild WBC elevation noted, though could be attributed to chronic use of steroids    Recent Labs     11/26/23  1604   WBC 10.42*       Consult wound care  Gabapentin 100 mg  Overnight IV Fluid hydration  Monitor WBC/fever curve and monitor off antibiotics; if stable, can discharge in AM

## 2023-11-27 NOTE — ASSESSMENT & PLAN NOTE
Blood Pressure: (!) 187/89    Plan:  Continue home meds  Monitor blood pressure  PRN IV Labetalol and Hydralazine for SBP > 160

## 2023-11-27 NOTE — CASE MANAGEMENT
Case Management Discharge Planning Note    Patient name eKnneth Le  Location [address] [facility] Robbie 430/-01 MRN [id number]  : 1940 Date 2023       Current Admission Date: 2023  Current Admission Diagnosis:Drainage from wound   Patient Active Problem List    Diagnosis Date Noted    Drainage from wound 2023    Temporal arteritis syndrome ([address]) 04/15/2023    Amaurosis fugax of left eye     Visual disturbance 2023    Current moderate episode of major depressive disorder without prior episode ([address]) 2022    Polyarthritis with positive rheumatoid factor ([address]) 2022    Depression 2021    Allergic rhinitis 2015    Vitamin D deficiency 2014    Mixed hyperlipidemia 2014    Hypertension, benign 2014    Acquired hypothyroidism 2014      LOS (days): 0  Geometric Mean LOS (GMLOS) (days):   Days to GMLOS:     OBJECTIVE:            Current admission status: Observation   Preferred Pharmacy:   [facility] [address] [place] [address]  [address] [address]  Phone: [phone] Fax: 220 [facility], [address]  Phone: [phone] Fax: [phone]    Primary Care Provider: Miranda Corrales MD    Primary Insurance: MEDICARE  Secondary Insurance: [facility]    DISCHARGE DETAILS:    [facility]ren referral for [facility] AT [facility] with wound care sent in [address]

## 2023-11-28 ENCOUNTER — OFFICE VISIT (OUTPATIENT)
Dept: WOUND CARE | Facility: CLINIC | Age: 83
End: 2023-11-28
Payer: MEDICARE

## 2023-11-28 ENCOUNTER — TELEPHONE (OUTPATIENT)
Dept: INTERNAL MEDICINE CLINIC | Facility: CLINIC | Age: 83
End: 2023-11-28

## 2023-11-28 VITALS
HEART RATE: 86 BPM | WEIGHT: 131 LBS | HEIGHT: 62 IN | BODY MASS INDEX: 24.11 KG/M2 | SYSTOLIC BLOOD PRESSURE: 197 MMHG | RESPIRATION RATE: 18 BRPM | DIASTOLIC BLOOD PRESSURE: 86 MMHG | TEMPERATURE: 96.6 F

## 2023-11-28 DIAGNOSIS — Z98.890 H/O MOHS MICROGRAPHIC SURGERY FOR SKIN CANCER: ICD-10-CM

## 2023-11-28 DIAGNOSIS — T81.89XA NON-HEALING SURGICAL WOUND, INITIAL ENCOUNTER: Primary | ICD-10-CM

## 2023-11-28 DIAGNOSIS — Z85.828 H/O MOHS MICROGRAPHIC SURGERY FOR SKIN CANCER: ICD-10-CM

## 2023-11-28 LAB
CAMPYLOBACTER DNA SPEC NAA+PROBE: NORMAL
SALMONELLA DNA SPEC QL NAA+PROBE: NORMAL
SHIGA TOXIN STX GENE SPEC NAA+PROBE: NORMAL
SHIGELLA DNA SPEC QL NAA+PROBE: NORMAL

## 2023-11-28 PROCEDURE — 99204 OFFICE O/P NEW MOD 45 MIN: CPT | Performed by: ORTHOPAEDIC SURGERY

## 2023-11-28 PROCEDURE — 11045 DBRDMT SUBQ TISS EACH ADDL: CPT | Performed by: ORTHOPAEDIC SURGERY

## 2023-11-28 PROCEDURE — 11042 DBRDMT SUBQ TIS 1ST 20SQCM/<: CPT | Performed by: ORTHOPAEDIC SURGERY

## 2023-11-28 PROCEDURE — 99213 OFFICE O/P EST LOW 20 MIN: CPT | Performed by: ORTHOPAEDIC SURGERY

## 2023-11-28 RX ORDER — LIDOCAINE 40 MG/G
CREAM TOPICAL ONCE
Status: COMPLETED | OUTPATIENT
Start: 2023-11-28 | End: 2023-11-28

## 2023-11-28 RX ADMIN — LIDOCAINE 1 APPLICATION: 40 CREAM TOPICAL at 14:11

## 2023-11-28 NOTE — TELEPHONE ENCOUNTER
Pt called,  kevin thought maybe bob reached out to pt     Possibly a homero?    Not sure where you wanted to put her for [facility]     Call pt on home phone please to reach     [phone]

## 2023-11-28 NOTE — PROGRESS NOTES
Patient ID: Ced Sellers is a 82 y.o. female Date of Birth 1940       Chief Complaint   Patient presents with    New Patient Visit     RLE wound post mohs procedure       Allergies:  Sulfa antibiotics and Latex    Diagnosis:      Diagnosis ICD-10-CM Associated Orders   1. Non-healing surgical wound, initial encounter  T81.89XA lidocaine (LMX) 4 % cream     Wound cleansing and dressings      2. H/O Mohs micrographic surgery for skin cancer  Z85.828 Wound cleansing and dressings    Z98.890               Assessment and Plan :  Initial Evaluation non healing surgical wound on RLE s/p Mohs procedure. No signs of infection today. Will f/u with vascular based on results of VAS. See results below. Debrided as below  Wound management with Prophase soak and apply Dermagran to wound bed. See wound orders below   Continue to f/u with Rheumatology. No harsh cleansers such as alcohol, peroxide, or antibacterial soap, do not submerge in water  Can cleanse with Prophase at dressing changes. Counseled on importance of frequent elevation of leg and increase exercise/walking level for wound healing. Continue to f/u with Dermatology  Followup in 1 week(s) or call sooner with questions or concerns or any signs of infection such as redness, swelling, increased/purulent drainage, fever, chills, increased severe pain. Subjective:   11/28: Patient is a 82 y.o. female with pmhx of hypothyroidism HLD, HTN, visual disturbance, Amaurosis fugax of left eye, h/o giant cell arteritis/temporal arteritis syndrome, Vitamin D deficiency, MDD/anxiety, GERD, hypothyroidism, polymyalgia rheumatica, BLE edema who presents for initial eval of RLE nonhealing surgical wound which has been present for about a month s/p MOHs procedure ib 10/25/23. Pt states she had sutures removed on 11/22/23.  Pt was admitted to hospital overnight (11/26-11/27) for observation and monitoring due to persistent pain, redness,drainage and burning sensation along incision site. Pt was treated with doxycycline twice before and ciprofloxacin for an additional 7 days. She reports she has baseline redness and lower extremity swelling due to recent use of steroids. Pt has been applying Silvasorb on the wound bed. Does not have an odor. No diabetes. No smoking, ETOH or drug use. Pt denies any sob, fatigue, N/V, CP, fever or chills. The following portions of the patient's history were reviewed and updated as appropriate:   Patient Active Problem List   Diagnosis    Allergic rhinitis    Mixed hyperlipidemia    Hypertension, benign    Acquired hypothyroidism    Vitamin D deficiency    Depression    Current moderate episode of major depressive disorder without prior episode ([address])    Polyarthritis with positive rheumatoid factor (HCC)    Visual disturbance    Amaurosis fugax of left eye    Temporal arteritis syndrome ([address])    Drainage from wound     Past Medical History:   Diagnosis Date    Depression 12/30/2021    Lumbar stenosis     Transient global amnesia      Past Surgical History:   Procedure Laterality Date    CATARACT EXTRACTION      MOHS SURGERY Right 10/25/2023    R Leg    IN LIGATION/BIOPSY TEMPORAL ARTERY Left 04/14/2023    Procedure: BIOPSY ARTERY TEMPORAL;  Surgeon: Elmer Lenz MD;  Location: [facility] OR;  Service: General     Family History   Problem Relation Age of Onset    Stroke Mother 61        CVA    Pancreatic cancer Father     Hyperlipidemia Sister       Social History     Socioeconomic History    Marital status: /Civil Union     Spouse name: None    Number of children: 3    Years of education: None    Highest education level: None   Occupational History    Occupation: [employer] office     Comment: Full time employment   Tobacco Use    Smoking status: Former    Smokeless tobacco: Never   Vaping Use    Vaping Use: Never used   Substance and Sexual Activity    Alcohol use: Yes     Comment: glass white wine with dinner    Drug use:  No Sexual activity: Not Currently     Partners: Male   Other Topics Concern    None   Social History Narrative    None     Social Determinants of Health     Financial Resource Strain: Not on file   Food Insecurity: Not on file   Transportation Needs: Not on file   Physical Activity: Not on file   Stress: Not on file   Social Connections: Not on file   Intimate Partner Violence: Not on file   Housing Stability: Not on file        Current Outpatient Medications:     brimonidine tartrate 0.2 % ophthalmic solution, , Disp: , Rfl:     Dermatological Products, [facility]. [facility]), Apply topically, Disp: , Rfl:     diphenoxylate-atropine (LOMOTIL) 2.5-0.025 mg per tablet, Take 1 tablet by mouth 4 (four) times a day as needed for diarrhea (Patient not taking: Reported on 11/1/2023), Disp: 60 tablet, Rfl: 1    fexofenadine (ALLEGRA) 180 MG tablet, Take 180 mg by mouth daily, Disp: , Rfl:     furosemide (LASIX) 20 mg tablet, Take 1 tablet (20 mg total) by mouth every other day, Disp: 15 tablet, Rfl: 0    gabapentin (NEURONTIN) 100 mg capsule, Take 1 capsule (100 mg total) by mouth daily at bedtime, Disp: 30 capsule, Rfl: 0    levothyroxine 75 mcg tablet, Take 1 tablet (75 mcg total) by mouth daily, Disp: 90 tablet, Rfl: 3    lisinopril (ZESTRIL) 5 mg tablet, Take 1 tablet (5 mg total) by mouth daily, Disp: 90 tablet, Rfl: 3    Lumigan 0.01 % ophthalmic drops, , Disp: , Rfl:     omeprazole (PriLOSEC) 40 MG capsule, Take 1 capsule (40 mg total) by mouth daily, Disp: 90 capsule, Rfl: 1    traZODone (DESYREL) 50 mg tablet, Take 1 tablet (50 mg total) by mouth daily at bedtime, Disp: 90 tablet, Rfl: 1    triamcinolone (KENALOG) 0.1 % cream, , Disp: , Rfl:   No current facility-administered medications for this visit. Review of Systems   Constitutional:  Negative for appetite change, chills, fatigue, fever and unexpected weight change. HENT:  Negative for congestion, hearing loss and postnasal drip.     Respiratory:  Negative for cough and shortness of breath. Cardiovascular:  Positive for leg swelling. Skin:  Positive for wound (RLE). Negative for rash. Neurological:  Negative for numbness. Hematological:  Does not bruise/bleed easily. Psychiatric/Behavioral: Negative. Objective:  BP (!) 197/86   Pulse 86   Temp (!) 96.6 °F (35.9 °C)   Resp 18   Ht 5' 2" (1.575 m)   Wt 59.4 kg (131 lb)   BMI 23.96 kg/m²   Pain Score:   8     Physical Exam  Vitals reviewed. Constitutional:       General: She is not in acute distress. Appearance: Normal appearance. She is well-developed and normal weight. HENT:      Head: Normocephalic and atraumatic. Cardiovascular:      Rate and Rhythm: Normal rate. Pulmonary:      Effort: Pulmonary effort is normal.   Musculoskeletal:         General: No deformity. Right lower leg: Edema present. Left lower leg: Edema present. Skin:     General: Skin is warm and dry. Findings: Wound (RLE) present. Comments: Yellow adherent wound. See wound assessment   Neurological:      General: No focal deficit present. Mental Status: She is alert and oriented to person, place, and time. Gait: Gait normal.   Psychiatric:         Mood and Affect: Mood and affect normal.         Behavior: Behavior normal. Behavior is cooperative. Debridement   Wound 11/27/23 Pretibial Right    Universal Protocol:  Consent: Verbal consent obtained. Written consent obtained. Risks and benefits: risks, benefits and alternatives were discussed  Consent given by: patient  Time out: Immediately prior to procedure a "time out" was called to verify the correct patient, procedure, equipment, support staff and site/side marked as required.   Patient understanding: patient states understanding of the procedure being performed  Patient identity confirmed: verbally with patient    Performed by: PA  Debridement type: surgical  Level of debridement: subcutaneous tissue  Pain control: lidocaine 4%  Post-debridement measurements  Length (cm): 2.2  Width (cm): 2  Depth (cm): 0.3  Percent debrided: 100%  Surface Area (cm^2): 4.4  Area debrided (cm^2): 4.4  Volume (cm^3): 1.32  Tissue and other material debrided: subcutaneous tissue  Devitalized tissue debrided: biofilm, exudate and slough  Instrument(s) utilized: curette  Bleeding: small  Hemostasis obtained with: pressure  Procedural pain (0-10): 0  Post-procedural pain: 0   Response to treatment: procedure was tolerated well                    Wound 11/27/23 Pretibial Right (Active)   Wound Description Granulation tissue; Yellow;Slough;Pink 11/28/23 1408   Gissell-wound Assessment Pink;Edema 11/28/23 1408   Wound Length (cm) 2.2 cm 11/28/23 1408   Wound Width (cm) 2 cm 11/28/23 1408   Wound Depth (cm) 0.2 cm 11/28/23 1408   Wound Surface Area (cm^2) 4.4 cm^2 11/28/23 1408   Wound Volume (cm^3) 0.88 cm^3 11/28/23 1408   Calculated Wound Volume (cm^3) 0.88 cm^3 11/28/23 1408   Change in Wound Size % -22.22 11/28/23 1408   Drainage Amount Moderate 11/28/23 1408   Drainage Description Serosanguineous; Yellow 11/28/23 1408   Non-staged Wound Description Full thickness 11/28/23 1408   Dressing Status Intact 11/28/23 1408         Wound Instructions:  Orders Placed This Encounter   Procedures    Wound cleansing and dressings     Right leg wound:     Wash your hands with soap and water. Remove old dressing, discard into plastic bag and place in trash. Cleanse the wound with prophase moistened gauze for 5minutes prior to applying a clean dressing. Do not use tissue or cotton balls. Do not scrub the wound. Pat dry using gauze. Shower no     Apply dermagran cut to the open wound. Cover with gauze  Secure with rolled gauze and tape  Change dressing 3 times weekly. The above was completed today at the wound center.     Supply order sent to [facility] [phone]     Standing Status:   Future     Standing Expiration Date:   11/28/2024       Total time spent today:  30 minutes. This includes reviewing the patient's chart, pertinent physician records Dr. Maria Fernanda Erwin, Internal Medicine, 11/1/23 and Dr. Ascencion Horowitz, Hospitalist, 11/26/23. Soni Spangler PA-C, [facility]    Portions of the record may have been created with voice recognition software. Occasional wrong word or "sound alike" substitutions may have occurred due to the inherent limitations of voice recognition software. Read the chart carefully and recognize, using context, where substitutions have occurred.

## 2023-11-28 NOTE — PATIENT INSTRUCTIONS
Orders Placed This Encounter   Procedures    Wound cleansing and dressings     Right leg wound:     Wash your hands with soap and water. Remove old dressing, discard into plastic bag and place in trash. Cleanse the wound with prophase moistened gauze for 5minutes prior to applying a clean dressing. Do not use tissue or cotton balls. Do not scrub the wound. Pat dry using gauze. Shower no     Apply dermagran cut to the open wound. Cover with gauze  Secure with rolled gauze and tape  Change dressing 3 times weekly. The above was completed today at the wound center.     Supply order sent to [facility] [phone]     Standing Status:   Future     Standing Expiration Date:   11/28/2024

## 2023-12-02 LAB
BACTERIA BLD CULT: NORMAL
BACTERIA BLD CULT: NORMAL

## 2023-12-05 ENCOUNTER — OFFICE VISIT (OUTPATIENT)
Dept: INTERNAL MEDICINE CLINIC | Facility: CLINIC | Age: 83
End: 2023-12-05
Payer: MEDICARE

## 2023-12-05 VITALS
SYSTOLIC BLOOD PRESSURE: 128 MMHG | OXYGEN SATURATION: 98 % | WEIGHT: 131 LBS | BODY MASS INDEX: 24.11 KG/M2 | HEIGHT: 62 IN | HEART RATE: 69 BPM | DIASTOLIC BLOOD PRESSURE: 82 MMHG

## 2023-12-05 DIAGNOSIS — L30.9 DERMATITIS: ICD-10-CM

## 2023-12-05 DIAGNOSIS — T81.89XD NON-HEALING SURGICAL WOUND, SUBSEQUENT ENCOUNTER: Primary | ICD-10-CM

## 2023-12-05 PROCEDURE — 99495 TRANSJ CARE MGMT MOD F2F 14D: CPT | Performed by: INTERNAL MEDICINE

## 2023-12-05 RX ORDER — CLOBETASOL PROPIONATE 0.5 MG/G
CREAM TOPICAL 2 TIMES DAILY
Qty: 45 G | Refills: 3 | Status: SHIPPED | OUTPATIENT
Start: 2023-12-05

## 2023-12-05 NOTE — PROGRESS NOTES
Assessment/Plan:     I sent in the steroid cream that was planned. Told her to just double check with the wound care that there is nothing that would be a problem with that. Put in referral to [facility]. She is also going to try and call advanced dermatology. Continue follow-up with wound care. Quality Measures:       Return for Next scheduled follow up. No problem-specific Assessment & Plan notes found for this encounter. Diagnoses and all orders for this visit:    Non-healing surgical wound, subsequent encounter  -     Ambulatory Referral to Dermatology; Future    Dermatitis  -     Ambulatory Referral to Dermatology; Future  -     clobetasol (TEMOVATE) 0.05 % cream; Apply topically 2 (two) times a day          Subjective:      Patient ID: India Rosales is a 82 y.o. female. Patient comes in today for follow-up from the hospital.  The biopsy site on her leg was not healing. She was admitted and treated. Also seen by wound care. Since discharge she has been following up with wound care and visiting nurses for this. She saw her dermatologist in follow-up and there was discussion to switch her cream to clobetasol. She states they were supposed to send that in but it was never sent.   She also would like to try and see a different dermatologist.        ALLERGIES:  Allergies   Allergen Reactions   • Sulfa Antibiotics Hives   • Latex Rash       CURRENT MEDICATIONS:    Current Outpatient Medications:   •  brimonidine tartrate 0.2 % ophthalmic solution, , Disp: , Rfl:   •  clobetasol (TEMOVATE) 0.05 % cream, Apply topically 2 (two) times a day, Disp: 45 g, Rfl: 3  •  fexofenadine (ALLEGRA) 180 MG tablet, Take 180 mg by mouth daily, Disp: , Rfl:   •  gabapentin (NEURONTIN) 100 mg capsule, Take 1 capsule (100 mg total) by mouth daily at bedtime, Disp: 30 capsule, Rfl: 0  •  levothyroxine 75 mcg tablet, Take 1 tablet (75 mcg total) by mouth daily, Disp: 90 tablet, Rfl: 3  •  lisinopril (ZESTRIL) 5 mg tablet, Take 1 tablet (5 mg total) by mouth daily, Disp: 90 tablet, Rfl: 3  •  Lumigan 0.01 % ophthalmic drops, , Disp: , Rfl:   •  omeprazole (PriLOSEC) 40 MG capsule, Take 1 capsule (40 mg total) by mouth daily, Disp: 90 capsule, Rfl: 1  •  traZODone (DESYREL) 50 mg tablet, Take 1 tablet (50 mg total) by mouth daily at bedtime, Disp: 90 tablet, Rfl: 1    ACTIVE PROBLEM LIST:  Patient Active Problem List   Diagnosis   • Allergic rhinitis   • Mixed hyperlipidemia   • Hypertension, benign   • Acquired hypothyroidism   • Vitamin D deficiency   • Depression   • Current moderate episode of major depressive disorder without prior episode (HCC)   • Polyarthritis with positive rheumatoid factor (HCC)   • Visual disturbance   • Amaurosis fugax of left eye   • Temporal arteritis syndrome (HCC)   • Drainage from wound       PAST MEDICAL HISTORY:  Past Medical History:   Diagnosis Date   • Depression 12/30/2021   • Lumbar stenosis    • Transient global amnesia        PAST SURGICAL HISTORY:  Past Surgical History:   Procedure Laterality Date   • CATARACT EXTRACTION     • MOHS SURGERY Right 10/25/2023    R Leg   • [place] LIGATION/BIOPSY TEMPORAL ARTERY Left 04/14/2023    Procedure: BIOPSY ARTERY TEMPORAL;  Surgeon: Jim Shukla MD;  Location: [facility] OR;  Service: General       FAMILY HISTORY:  Family History   Problem Relation Age of Onset   • Stroke Mother 61        CVA   • Pancreatic cancer Father    • Hyperlipidemia Sister        SOCIAL HISTORY:  Social History     Socioeconomic History   • Marital status: /Civil Union     Spouse name: Not on file   • Number of children: 3   • Years of education: Not on file   • Highest education level: Not on file   Occupational History   • Occupation: [employer] office     Comment: Full time employment   Tobacco Use   • Smoking status: Former   • Smokeless tobacco: Never   Vaping Use   • Vaping Use: Never used   Substance and Sexual Activity   • Alcohol use:  Yes Comment: glass white wine with dinner   • Drug use: No   • Sexual activity: Not Currently     Partners: Male   Other Topics Concern   • Not on file   Social History Narrative   • Not on file     Social Determinants of Health     Financial Resource Strain: Not on file   Food Insecurity: Not on file   Transportation Needs: Not on file   Physical Activity: Not on file   Stress: Not on file   Social Connections: Not on file   Intimate Partner Violence: Not on file   Housing Stability: Not on file       Review of Systems   Constitutional:  Negative for fever. Respiratory:  Negative for shortness of breath. Cardiovascular:  Negative for chest pain. Gastrointestinal:  Negative for abdominal pain. Objective:  Vitals:    12/05/23 1442   BP: 128/82   BP Location: Left arm   Patient Position: Sitting   Cuff Size: Adult   Pulse: 69   SpO2: 98%   Weight: 59.4 kg (131 lb)   Height: 5' 2" (1.575 m)     Body mass index is 23.96 kg/m². Physical Exam  Vitals and nursing note reviewed. Constitutional:       Appearance: Normal appearance. Neurological:      Mental Status: She is alert.            RESULTS:  Cholesterol   Date/Time Value Ref Range Status   07/07/2023 10:56  (H) See Comment mg/dL Final     Comment:     Cholesterol:         Pediatric <18 Years        Desirable          <170 mg/dL      Borderline High    170-199 mg/dL      High               >=200 mg/dL        Adult >=18 Years            Desirable        <200 mg/dL      Borderline High  200-239 mg/dL      High             >239 mg/dL       Triglycerides   Date/Time Value Ref Range Status   07/07/2023 10:56  See Comment mg/dL Final     Comment:     Triglyceride:     0-9Y            <75mg/dL     10Y-17Y         <90 mg/dL       >=18Y     Normal          <150 mg/dL     Borderline High 150-199 mg/dL     High            200-499 mg/dL        Very High       >499 mg/dL    Specimen collection should occur prior to N-Acetylcysteine or Metamizole administration due to the potential for falsely depressed results. 12/02/2015 11:22  mg/dL Final     Comment:     TRIGLYCERIDE:       Normal              <150 mg/dl       Borderline High    150-199 mg/dl       High               200-499 mg/dl       Very High          >499 mg/dl  _______________________________________       HDL   Date/Time Value Ref Range Status   12/02/2015 11:22 AM 58 mg/dL Final     Comment:     HDL:       High       >59 mg/dl       Low        <41 mg/dl  ______________________________       HDL, Direct   Date/Time Value Ref Range Status   07/07/2023 10:56 AM 57 >=50 mg/dL Final     Comment:     Specimen collection should occur prior to Metamizole administration due to the potential for falsley depressed results. LDL Calculated   Date/Time Value Ref Range Status   07/07/2023 10:56  (H) 0 - 100 mg/dL Final     Comment:     LDL Cholesterol:     Optimal           <100 mg/dl     Near Optimal      100-129 mg/dl     Above Optimal       Borderline High 130-159 mg/dl       High            160-189 mg/dl       Very High       >189 mg/dl         This screening LDL is a calculated result. It does not have the accuracy of the Direct Measured LDL in the monitoring of patients with hyperlipidemia and/or statin therapy. Direct Measure LDL (FEK822) must be ordered separately in these patients. 12/02/2015 11:22 AM 99 0 - 100 mg/dL Final     Comment:     LDL, CALCULATED:     This screening LDL is a calculated result. It does not have the accuracy of the Direct Measured LDL in the  monitoring of patients with hyperlipidemia and/or statin therapy.   Direct Measured LDL (Test Code 3126) must be ordered separately in these  patients.  ______________________________  The above 4 analytes were performed by [facility]  [address] [place] [id number]       Hemoglobin   Date/Time Value Ref Range Status   11/27/2023 05:01 AM 11.3 (L) 11.5 - 15.4 g/dL Final   12/02/2015 11:22 AM 14.0 11.5 - 15.4 g/dL Final     Hematocrit   Date/Time Value Ref Range Status   11/27/2023 05:01 AM 34.4 (L) 34.8 - 46.1 % Final   12/02/2015 11:22 AM 42.6 34.8 - 46.1 % Final     Platelets   Date/Time Value Ref Range Status   11/27/2023 05:01  149 - 390 Thousands/uL Final   12/02/2015 11:22  149 - 390 Thousand/uL Final     TSH 3RD GENERATON   Date/Time Value Ref Range Status   09/27/2023 10:33 AM 1.730 0.450 - 4.500 uIU/mL Final     Comment:     The recommended reference ranges for TSH during pregnancy are as follows:   First trimester 0.1 to 2.5 uIU/mL   Second trimester  0.2 to 3.0 uIU/mL   Third trimester 0.3 to 3.0 uIU/m    Note: Normal ranges may not apply to patients who are transgender, non-binary, or whose legal sex, sex at birth, and gender identity differ. Adult TSH (3rd generation) reference range follows the recommended guidelines of the American Thyroid Association, January, 2020.   12/02/2015 11:22 AM 2.880 0.358 - 3.740 uIU/ML Final     Comment:     *Patients undergoing fluorescein dye angiography may retain small  amounts of fluorescein in the body for 48-72 hours post procedure. Samples containing fluorescein can produce falsely depressed TSH   values. If the patient had this procedure, a specimen should be  resubmitted post fluorescein clearance. The above 1 analytes were performed by [facility] [id number]       Free T4   Date/Time Value Ref Range Status   09/27/2023 10:33 AM 1.05 0.61 - 1.12 ng/dL Final     Comment:     Specimens with biotin concentrations > 10 ng/mL can lead to significant (> 10%) positive bias in result.    12/02/2015 11:22 AM 1.13 0.76 - 1.46 ng/dL Final     Comment:     The above 1 analytes were performed by [facility]  [address] [place] [id number]       Sodium   Date/Time Value Ref Range Status   11/27/2023 05:01  135 - 147 mmol/L Final     BUN   Date/Time Value Ref Range Status   11/27/2023 05:01 AM 16 5 - 25 mg/dL Final   12/02/2015 11:22 AM 17 5 - 25 mg/dL Final     Creatinine   Date/Time Value Ref Range Status   11/27/2023 05:01 AM 0.68 0.60 - 1.30 mg/dL Final     Comment:     Standardized to IDMS reference method   12/02/2015 11:22 AM 0.80 0.60 - 1.30 mg/dL Final     Comment:     Standardized to IDMS reference method      In chart    This note was created with voice recognition software. Phonic, grammatical and spelling errors may be present within the note as a result.

## 2023-12-05 NOTE — PROGRESS NOTES
Assessment & Plan     1. Non-healing surgical wound, subsequent encounter  -     Ambulatory Referral to Dermatology; Future    2. Dermatitis  -     Ambulatory Referral to Dermatology; Future  -     clobetasol (TEMOVATE) 0.05 % cream; Apply topically 2 (two) times a day    I sent in the steroid cream that was planned. Told her to just double check with the wound care that there is nothing that would be a problem with that. Put in referral to [facility]. She is also going to try and call advanced dermatology. Continue follow-up with wound care. Subjective     Transitional Care Management Review:   India Rosales is a 82 y.o. female here for TCM follow up. During the TCM phone call patient stated:  TCM Call       Date and time call was made  11/27/2023  3:30 PM    Hospital care reviewed  Records reviewed    Patient [facility] hospitial[facility] at  [facility]    Date of Admission  11/26/23    Date of discharge  11/27/23    Diagnosis  wound infection    Disposition  Home    Were the patients medications reviewed and updated  Yes    Current Symptoms  None          TCM Call       Post hospital issues  None    Should patient be enrolled in anticoag monitoring? No    Scheduled for follow up? Yes    Did you obtain your prescribed medications  Yes    Do you need help managing your prescriptions or medications  No    Is transportation to your appointment needed  No    I have advised the patient to call PCP with any new or worsening symptoms  Stephy Forde, Practice Coordinator    Living Arrangements  Family members    Counseling  Patient          Patient comes in today for follow-up from the hospital.  The biopsy site on her leg was not healing. She was admitted and treated. Also seen by wound care. Since discharge she has been following up with wound care and visiting nurses for this. She saw her dermatologist in follow-up and there was discussion to switch her cream to clobetasol.   She states they were supposed to send that in but it was never sent.   She also would like to try and see a different dermatologist.        Review of Systems    Objective     /82 (BP Location: Left arm, Patient Position: Sitting, Cuff Size: Adult)   Pulse 69   Ht 5' 2" (1.575 m)   Wt 59.4 kg (131 lb)   SpO2 98%   BMI 23.96 kg/m²      Physical Exam  Medications have been reviewed by provider in current encounter    Savannah Osborn MD

## 2023-12-11 ENCOUNTER — TELEPHONE (OUTPATIENT)
Dept: RHEUMATOLOGY | Facility: CLINIC | Age: 83
End: 2023-12-11

## 2023-12-11 NOTE — TELEPHONE ENCOUNTER
Can you please cancel this appointment for today, the patient was not able to log on through [employer] and she was rescheduled to a different day in person. Patient was not seen today, therefore I deleted my pre-charted note.     Thanks

## 2023-12-12 ENCOUNTER — OFFICE VISIT (OUTPATIENT)
Dept: WOUND CARE | Facility: CLINIC | Age: 83
End: 2023-12-12
Payer: MEDICARE

## 2023-12-12 ENCOUNTER — TELEPHONE (OUTPATIENT)
Dept: WOUND CARE | Facility: CLINIC | Age: 83
End: 2023-12-12

## 2023-12-12 VITALS
TEMPERATURE: 96.5 F | SYSTOLIC BLOOD PRESSURE: 182 MMHG | RESPIRATION RATE: 18 BRPM | DIASTOLIC BLOOD PRESSURE: 79 MMHG | HEART RATE: 95 BPM

## 2023-12-12 DIAGNOSIS — Z98.890 H/O MOHS MICROGRAPHIC SURGERY FOR SKIN CANCER: ICD-10-CM

## 2023-12-12 DIAGNOSIS — T81.89XA NON-HEALING SURGICAL WOUND, INITIAL ENCOUNTER: Primary | ICD-10-CM

## 2023-12-12 DIAGNOSIS — Z85.828 H/O MOHS MICROGRAPHIC SURGERY FOR SKIN CANCER: ICD-10-CM

## 2023-12-12 PROCEDURE — 99213 OFFICE O/P EST LOW 20 MIN: CPT | Performed by: ORTHOPAEDIC SURGERY

## 2023-12-12 RX ORDER — SODIUM HYPOCHLORITE 2.5 MG/ML
1 SOLUTION TOPICAL DAILY
Qty: 473 ML | Refills: 0 | Status: SHIPPED | OUTPATIENT
Start: 2023-12-12

## 2023-12-12 RX ORDER — LIDOCAINE 40 MG/G
CREAM TOPICAL ONCE
Status: COMPLETED | OUTPATIENT
Start: 2023-12-12 | End: 2023-12-12

## 2023-12-12 RX ADMIN — LIDOCAINE: 40 CREAM TOPICAL at 11:36

## 2023-12-12 NOTE — TELEPHONE ENCOUNTER
Reviewed wound care orders with patient and explained the Calmoseptine is not a prescribed medication it is over the counter and can be picked up at the pharmacy. Patient relayed understanding.

## 2023-12-12 NOTE — LETTER
[address] [address]  Phone#  [phone]  Fax#  [phone]    Patient:  Michelle Neil  YOB: 1940  Phone:  [phone]  Date of Visit:  12/12/2023    Orders Placed This Encounter   Procedures   • Wound cleansing and dressings     Wound cleansing and dressings         Right leg wound:      Wash your hands with soap and water. Remove old dressing, discard into plastic bag and place in trash. Cleanse the wound with 5 to 10 minute soak of Dakins moistened gauze prior to applying a clean dressing. Remove wet gauze. Do not use tissue or cotton balls. Do not scrub the wound. Pat dry using gauze. Shower no      Apply Calmoseptine to zach wound  Apply silver alginate (maxsoY [employer]bin[employer]) cut to the open wound. Cover with gauze (do not use Telfa non adherent dressing it traps drainage)   Secure with rolled gauze and tape  Change dressing daily    Dakins called into [facility] please  after wound visit also  the Calmoseptine is over the counter          The above was completed today at the wound center.      Standing Status:   Future     Standing Expiration Date:   12/12/2024         Electronically signed by Fredrich Mortimer, PA-C

## 2023-12-12 NOTE — PROGRESS NOTES
Patient ID: Favian Camacho is a 82 y.o. female Date of Birth 1940       Chief Complaint   Patient presents with    Follow Up Wound Care Visit     Non healing surgical wound right leg       Allergies:  Sulfa antibiotics and Latex    Diagnosis:   Diagnosis ICD-10-CM Associated Orders   1. Non-healing surgical wound, initial encounter  T81.89XA lidocaine (LMX) 4 % cream     Wound cleansing and dressings     sodium hypochlorite (DAKIN'S HALF-STRENGTH) external solution      2. H/O Mohs micrographic surgery for skin cancer  G10.581     Z98.890            Assessment and Plan :  Follow-up evaluation non-healing surgical wound on RLE with yellow green drainage. Mechanically debrided with Dakins solution as she refused debridement today. Wound management with Dakins soak and apply Maxorb Ag to wound bed. See wound orders below   Continue to f/u with Rheumatology. No harsh cleansers such as alcohol, peroxide, or antibacterial soap, do not submerge in water  Can cleanse with Dakins at dressing changes. Counseled on importance of frequent elevation of leg and increase exercise/walking level for wound healing. Continue to f/u with Dermatology  Followup in 1 week(s) or call sooner with questions or concerns or any signs of infection such as redness, swelling, increased/purulent drainage, fever, chills, increased severe pain. Subjective:   11/28: Patient is a 82 y.o. female with pmhx of hypothyroidism HLD, HTN, visual disturbance, Amaurosis fugax of left eye, h/o giant cell arteritis/temporal arteritis syndrome, Vitamin D deficiency, MDD/anxiety, GERD, hypothyroidism, polymyalgia rheumatica, BLE edema who presents for initial eval of RLE nonhealing surgical wound which has been present for about a month s/p MOHs procedure ib 10/25/23. Pt states she had sutures removed on 11/22/23.  Pt was admitted to hospital overnight (11/26-11/27) for observation and monitoring due to persistent pain, redness,drainage and burning sensation along incision site. Pt was treated with doxycycline twice before and ciprofloxacin for an additional 7 days. She reports she has baseline redness and lower extremity swelling due to recent use of steroids. Pt has been applying Silvasorb on the wound bed. Does not have an odor. No diabetes. No smoking, ETOH or drug use. Pt denies any sob, fatigue, N/V, CP, fever or chills. 12/12:  Pt here for f/u evaluation of non healing surgical wound on RLE s/p Mohs procedure. Pt has been applying Dermagran to wound bed and covering with Telfa (was instructed to wear DSD). Pt c/o increased drainage. Denies fevers, chills, increased severe pain.        The following portions of the patient's history were reviewed and updated as appropriate:   Patient Active Problem List   Diagnosis    Allergic rhinitis    Mixed hyperlipidemia    Hypertension, benign    Acquired hypothyroidism    Vitamin D deficiency    Depression    Current moderate episode of major depressive disorder without prior episode ([address])    Polyarthritis with positive rheumatoid factor (HCC)    Visual disturbance    Amaurosis fugax of left eye    Temporal arteritis syndrome ([address])    Drainage from wound     Past Medical History:   Diagnosis Date    Depression 12/30/2021    Lumbar stenosis     Transient global amnesia      Past Surgical History:   Procedure Laterality Date    CATARACT EXTRACTION      MOHS SURGERY Right 10/25/2023    R Leg    SD LIGATION/BIOPSY TEMPORAL ARTERY Left 04/14/2023    Procedure: BIOPSY ARTERY TEMPORAL;  Surgeon: Paulette Garrison MD;  Location: [facility] OR;  Service: General     Family History   Problem Relation Age of Onset    Stroke Mother 61        CVA    Pancreatic cancer Father     Hyperlipidemia Sister      Social History     Socioeconomic History    Marital status: /Civil Union     Spouse name: None    Number of children: 3    Years of education: None    Highest education level: None   Occupational History Occupation: [employer] office     Comment: Full time employment   Tobacco Use    Smoking status: Former    Smokeless tobacco: Never   Vaping Use    Vaping Use: Never used   Substance and Sexual Activity    Alcohol use: Yes     Comment: glass white wine with dinner    Drug use: No    Sexual activity: Not Currently     Partners: Male   Other Topics Concern    None   Social History Narrative    None     Social Determinants of Health     Financial Resource Strain: Not on file   Food Insecurity: Not on file   Transportation Needs: Not on file   Physical Activity: Not on file   Stress: Not on file   Social Connections: Not on file   Intimate Partner Violence: Not on file   Housing Stability: Not on file       Current Outpatient Medications:     sodium hypochlorite (DAKIN'S HALF-STRENGTH) external solution, Apply 1 Application topically daily Soak gauze and pack into wounds daily. , Disp: 473 mL, Rfl: 0    brimonidine tartrate 0.2 % ophthalmic solution, , Disp: , Rfl:     clobetasol (TEMOVATE) 0.05 % cream, Apply topically 2 (two) times a day, Disp: 45 g, Rfl: 3    fexofenadine (ALLEGRA) 180 MG tablet, Take 180 mg by mouth daily, Disp: , Rfl:     gabapentin (NEURONTIN) 100 mg capsule, Take 1 capsule (100 mg total) by mouth daily at bedtime, Disp: 30 capsule, Rfl: 0    levothyroxine 75 mcg tablet, Take 1 tablet (75 mcg total) by mouth daily, Disp: 90 tablet, Rfl: 3    lisinopril (ZESTRIL) 5 mg tablet, Take 1 tablet (5 mg total) by mouth daily, Disp: 90 tablet, Rfl: 3    Lumigan 0.01 % ophthalmic drops, , Disp: , Rfl:     omeprazole (PriLOSEC) 40 MG capsule, Take 1 capsule (40 mg total) by mouth daily, Disp: 90 capsule, Rfl: 1    traZODone (DESYREL) 50 mg tablet, Take 1 tablet (50 mg total) by mouth daily at bedtime, Disp: 90 tablet, Rfl: 1  No current facility-administered medications for this visit. Review of Systems   Constitutional:  Negative for appetite change, chills, fatigue, fever and unexpected weight change. HENT:  Negative for congestion, hearing loss and postnasal drip. Respiratory:  Negative for cough and shortness of breath. Cardiovascular:  Positive for leg swelling. Skin:  Positive for wound (RLE). Negative for rash. Neurological:  Negative for numbness. Hematological:  Does not bruise/bleed easily. Psychiatric/Behavioral: Negative. Objective:  BP (!) 182/79 Comment: pt report anxious for visit  Pulse 95   Temp (!) 96.5 °F (35.8 °C)   Resp 18   Pain Score:   2     Physical Exam  Vitals reviewed. Constitutional:       General: She is not in acute distress. Appearance: Normal appearance. She is well-developed and normal weight. HENT:      Head: Normocephalic and atraumatic. Cardiovascular:      Rate and Rhythm: Normal rate. Pulmonary:      Effort: Pulmonary effort is normal.   Musculoskeletal:         General: No deformity. Right lower leg: Edema present. Left lower leg: Edema present. Skin:     General: Skin is warm and dry. Findings: Wound (RLE) present. Comments: Yellow green drainage. See wound assessment   Neurological:      General: No focal deficit present. Mental Status: She is alert and oriented to person, place, and time. Gait: Gait normal.   Psychiatric:         Mood and Affect: Mood and affect normal.         Behavior: Behavior normal. Behavior is cooperative. Wound 11/27/23 Pretibial Right (Active)   Wound Description Granulation tissue; Yellow;Slough;Pink 12/12/23 1133   Gissell-wound Assessment Pink;Edema; Maceration 12/12/23 1133   Wound Length (cm) 2.1 cm 12/12/23 1133   Wound Width (cm) 2 cm 12/12/23 1133   Wound Depth (cm) 0.1 cm 12/12/23 1133   Wound Surface Area (cm^2) 4.2 cm^2 12/12/23 1133   Wound Volume (cm^3) 0.42 cm^3 12/12/23 1133   Calculated Wound Volume (cm^3) 0.42 cm^3 12/12/23 1133   Change in Wound Size % 41.67 12/12/23 1133   Drainage Amount Moderate 12/12/23 1133   Drainage Description Green;Yellow;Serosanguineous 12/12/23 1133   Non-staged Wound Description Full thickness 12/12/23 1133   Dressing Status Intact 12/12/23 1133         Wound Instructions:  Orders Placed This Encounter   Procedures    Wound cleansing and dressings     Wound cleansing and dressings         Right leg wound:      Wash your hands with soap and water. Remove old dressing, discard into plastic bag and place in trash. Cleanse the wound with 5 to 10 minute soak of Dakins moistened gauze prior to applying a clean dressing. Remove wet gauze. Do not use tissue or cotton balls. Do not scrub the wound. Pat dry using gauze. Shower no      Apply Calmoseptine to zach wound  Apply silver alginate (maxsoViroXis AG) cut to the open wound. Cover with gauze (do not use Telfa non adherent dressing it traps drainage)   Secure with rolled gauze and tape  Change dressing daily    Dakins called into [facility] please  after wound visit also  the Calmoseptine is over the counter          The above was completed today at the wound center. Standing Status:   Future     Standing Expiration Date:   12/12/2024       Jade Joseph PA-C, [facility]      Portions of the record may have been created with voice recognition software. Occasional wrong word or "sound alike" substitutions may have occurred due to the inherent limitations of voice recognition software. Read the chart carefully and recognize, using context, where substitutions have occurred.

## 2023-12-12 NOTE — PATIENT INSTRUCTIONS
Orders Placed This Encounter   Procedures    Wound cleansing and dressings     Wound cleansing and dressings         Right leg wound:      Wash your hands with soap and water. Remove old dressing, discard into plastic bag and place in trash. Cleanse the wound with 5 to 10 minute soak of Dakins moistened gauze prior to applying a clean dressing. Remove wet gauze. Do not use tissue or cotton balls. Do not scrub the wound. Pat dry using gauze. Shower no      Apply Calmoseptine to zach wound  Apply silver alginate (maxso[employer]) cut to the open wound. Cover with gauze (do not use Telfa non adherent dressing it traps drainage)   Secure with rolled gauze and tape  Change dressing daily    Dakins called into [facility] please  after wound visit also  the Calmoseptine is over the counter          The above was completed today at the wound center.      Standing Status:   Future     Standing Expiration Date:   12/12/2024

## 2023-12-18 ENCOUNTER — TELEPHONE (OUTPATIENT)
Dept: WOUND CARE | Facility: CLINIC | Age: 83
End: 2023-12-18

## 2023-12-18 NOTE — TELEPHONE ENCOUNTER
Patient called to inquire when vna is to come back. Instructed patient [facility] is not affliated with [facility] and she will have to reach out to [facility] directly in regards to visiting nurse schedule. Provided telephone number for [facility] to Patient. Patient also reports burning with use of dakins soak. Advised patient after soak for 10 minutes to remove moistened gauze and rinse with normal saline solution and pat dry. Pateint able to repeat back instructions.

## 2023-12-19 DIAGNOSIS — F19.982 DRUG-INDUCED INSOMNIA (HCC): ICD-10-CM

## 2023-12-19 DIAGNOSIS — H53.9 VISUAL DISTURBANCE: ICD-10-CM

## 2023-12-19 DIAGNOSIS — M31.6 TEMPORAL ARTERITIS SYNDROME (HCC): ICD-10-CM

## 2023-12-19 DIAGNOSIS — M05.80 POLYARTHRITIS WITH POSITIVE RHEUMATOID FACTOR (HCC): ICD-10-CM

## 2023-12-19 RX ORDER — OMEPRAZOLE 40 MG/1
40 CAPSULE, DELAYED RELEASE ORAL DAILY
Qty: 90 CAPSULE | Refills: 3 | Status: SHIPPED | OUTPATIENT
Start: 2023-12-19

## 2023-12-19 RX ORDER — TRAZODONE HYDROCHLORIDE 50 MG/1
50 TABLET ORAL
Qty: 90 TABLET | Refills: 3 | Status: SHIPPED | OUTPATIENT
Start: 2023-12-19

## 2023-12-26 ENCOUNTER — TELEPHONE (OUTPATIENT)
Dept: INTERNAL MEDICINE CLINIC | Facility: CLINIC | Age: 83
End: 2023-12-26

## 2023-12-26 DIAGNOSIS — L08.9 WOUND INFECTION: ICD-10-CM

## 2023-12-26 DIAGNOSIS — T14.8XXA WOUND INFECTION: ICD-10-CM

## 2023-12-26 RX ORDER — GABAPENTIN 100 MG/1
100 CAPSULE ORAL
Qty: 30 CAPSULE | Refills: 3 | Status: SHIPPED | OUTPATIENT
Start: 2023-12-26

## 2023-12-26 NOTE — TELEPHONE ENCOUNTER
Patient was given Gabapentin 100 mg capsule when in the hospital. Patient only has one pill left and didn't know if she was to continue taking it or not? If you want her to continue this medication please send in the script.      Please advise.......

## 2023-12-28 ENCOUNTER — OFFICE VISIT (OUTPATIENT)
Dept: WOUND CARE | Facility: CLINIC | Age: 83
End: 2023-12-28
Payer: MEDICARE

## 2023-12-28 VITALS
DIASTOLIC BLOOD PRESSURE: 80 MMHG | TEMPERATURE: 96 F | HEART RATE: 88 BPM | SYSTOLIC BLOOD PRESSURE: 164 MMHG | RESPIRATION RATE: 18 BRPM

## 2023-12-28 DIAGNOSIS — T81.89XA NON-HEALING SURGICAL WOUND, INITIAL ENCOUNTER: Primary | ICD-10-CM

## 2023-12-28 DIAGNOSIS — Z98.890 H/O MOHS MICROGRAPHIC SURGERY FOR SKIN CANCER: ICD-10-CM

## 2023-12-28 DIAGNOSIS — S81.801A OPEN WOUND OF RIGHT LOWER LEG, INITIAL ENCOUNTER: ICD-10-CM

## 2023-12-28 DIAGNOSIS — Z85.828 H/O MOHS MICROGRAPHIC SURGERY FOR SKIN CANCER: ICD-10-CM

## 2023-12-28 PROCEDURE — 11045 DBRDMT SUBQ TISS EACH ADDL: CPT | Performed by: ORTHOPAEDIC SURGERY

## 2023-12-28 PROCEDURE — 11042 DBRDMT SUBQ TIS 1ST 20SQCM/<: CPT | Performed by: ORTHOPAEDIC SURGERY

## 2023-12-28 PROCEDURE — 99213 OFFICE O/P EST LOW 20 MIN: CPT | Performed by: ORTHOPAEDIC SURGERY

## 2023-12-28 RX ORDER — LIDOCAINE 40 MG/G
CREAM TOPICAL ONCE
Status: COMPLETED | OUTPATIENT
Start: 2023-12-28 | End: 2023-12-28

## 2023-12-28 RX ADMIN — LIDOCAINE: 40 CREAM TOPICAL at 10:16

## 2023-12-28 NOTE — PROGRESS NOTES
Patient ID: Johana Rodriguez is a 83 y.o. female Date of Birth 1940       Chief Complaint   Patient presents with    Follow Up Wound Care Visit     Non healing surgical wound       Allergies:  Sulfa antibiotics and Latex    Diagnosis:   Diagnosis ICD-10-CM Associated Orders   1. Non-healing surgical wound, initial encounter  T81.89XA lidocaine (LMX) 4 % cream     Wound cleansing and dressings Right Pretibial     Wound cleansing and dressings Other (comment) (Open Wound ) Right Pretibial (Open )     Debridement      2. H/O Mohs micrographic surgery for skin cancer  Z85.828     Z98.890       3. Open wound of right lower leg, initial encounter  S81.801A            Assessment and Plan :  Follow-up evaluation non-healing surgical wound on RLE with new open wound lateral to surgical wound. New wound is suspicious for skin cancer. Recommend dermatological evaluation. .   Debrided as below.  Change wound management to Dakins soak followed by Hydorfera Blue to surgical wound and apply Dermagran to new wound bed. See wound orders below   Continue to f/u with Rheumatology.  No harsh cleansers such as alcohol, peroxide, or antibacterial soap, do not submerge in water  Can cleanse with Dakins at dressing changes.   Counseled on importance of frequent elevation of leg and increase exercise/walking level for wound healing.  Continue to f/u with Dermatology  Followup in 2 week(s) or call sooner with questions or concerns or any signs of infection such as redness, swelling, increased/purulent drainage, fever, chills, increased severe pain.      Subjective:   11/28: Patient is a 83 y.o. female with pmhx of hypothyroidism HLD, HTN, visual disturbance, Amaurosis fugax of left eye, h/o giant cell arteritis/temporal arteritis syndrome, Vitamin D deficiency, MDD/anxiety, GERD, hypothyroidism, polymyalgia rheumatica, BLE edema who presents for initial eval of RLE nonhealing surgical wound which has been present for about a month s/p  MOHs procedure ib 10/25/23. Pt states she had sutures removed on 11/22/23. Pt was admitted to hospital overnight (11/26-11/27) for observation and monitoring due to persistent pain, redness,drainage and burning sensation along incision site.  Pt was treated with doxycycline twice before and ciprofloxacin for an additional 7 days.She reports she has baseline redness and lower extremity swelling due to recent use of steroids.  Pt has been applying Silvasorb on the wound bed. Does not have an odor.No diabetes.  No smoking, ETOH or drug use.  Pt denies any sob, fatigue, N/V, CP, fever or chills.    12/12:  Pt here for f/u evaluation of non healing surgical wound on RLE s/p Mohs procedure. Pt has been applying Dermagran to wound bed and covering with Telfa (was instructed to wear DSD). Pt c/o increased drainage. Denies fevers, chills, increased severe pain.     12/28: Pt here for f/u evaluation of non-healing surgical wound on RLE s/p Mohs procedure. Pt states she has a new wound on her RLE from what she thinks is a scab she may have scratched off. Pt has been applying Maxorb Ag to wound bed.   Denies fevers, chills, increased severe pain.           The following portions of the patient's history were reviewed and updated as appropriate:   Patient Active Problem List   Diagnosis    Allergic rhinitis    Mixed hyperlipidemia    Hypertension, benign    Acquired hypothyroidism    Vitamin D deficiency    Depression    Current moderate episode of major depressive disorder without prior episode (HCC)    Polyarthritis with positive rheumatoid factor ([facility])    Visual disturbance    Amaurosis fugax of left eye    Temporal arteritis syndrome (HCC)    Drainage from wound     Past Medical History:   Diagnosis Date    Depression 12/30/2021    Lumbar stenosis     Transient global amnesia      Past Surgical History:   Procedure Laterality Date    CATARACT EXTRACTION      MOHS SURGERY Right 10/25/2023    R Leg    TN LIGATION/BIOPSY  TEMPORAL ARTERY Left 04/14/2023    Procedure: BIOPSY ARTERY TEMPORAL;  Surgeon: Carlos Lovelace MD;  Location: [facility];  Service: General     Family History   Problem Relation Age of Onset    Stroke Mother 60        CVA    Pancreatic cancer Father     Hyperlipidemia Sister      Social History     Socioeconomic History    Marital status: /Civil Union     Spouse name: None    Number of children: 3    Years of education: None    Highest education level: None   Occupational History    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   Tobacco Use    Smoking status: Former    Smokeless tobacco: Never   Vaping Use    Vaping status: Never Used   Substance and Sexual Activity    Alcohol use: Yes     Comment: glass white wine with dinner    Drug use: No    Sexual activity: Not Currently     Partners: Male   Other Topics Concern    None   Social History Narrative    None     Social Determinants of Health     Financial Resource Strain: Not on file   Food Insecurity: Not on file   Transportation Needs: Not on file   Physical Activity: Not on file   Stress: Not on file   Social Connections: Not on file   Intimate Partner Violence: Not on file   Housing Stability: Not on file       Current Outpatient Medications:     brimonidine tartrate 0.2 % ophthalmic solution, , Disp: , Rfl:     clobetasol (TEMOVATE) 0.05 % cream, Apply topically 2 (two) times a day, Disp: 45 g, Rfl: 3    fexofenadine (ALLEGRA) 180 MG tablet, Take 180 mg by mouth daily, Disp: , Rfl:     gabapentin (NEURONTIN) 100 mg capsule, Take 1 capsule (100 mg total) by mouth daily at bedtime, Disp: 30 capsule, Rfl: 3    levothyroxine 75 mcg tablet, Take 1 tablet (75 mcg total) by mouth daily, Disp: 90 tablet, Rfl: 3    lisinopril (ZESTRIL) 5 mg tablet, Take 1 tablet (5 mg total) by mouth daily, Disp: 90 tablet, Rfl: 3    Lumigan 0.01 % ophthalmic drops, , Disp: , Rfl:     omeprazole (PriLOSEC) 40 MG capsule, TAKE 1 CAPSULE BY MOUTH DAILY, Disp:  90 capsule, Rfl: 3    sodium hypochlorite (DAKIN'S HALF-STRENGTH) external solution, Apply 1 Application topically daily Soak gauze and pack into wounds daily., Disp: 473 mL, Rfl: 0    traZODone (DESYREL) 50 mg tablet, TAKE 1 TABLET BY MOUTH DAILY AT  BEDTIME, Disp: 90 tablet, Rfl: 3  No current facility-administered medications for this visit.    Review of Systems   Constitutional:  Negative for appetite change, chills, fatigue, fever and unexpected weight change.   HENT:  Negative for congestion, hearing loss and postnasal drip.    Respiratory:  Negative for cough and shortness of breath.    Cardiovascular:  Positive for leg swelling.   Skin:  Positive for wound (RLE). Negative for rash.   Neurological:  Negative for numbness.   Hematological:  Does not bruise/bleed easily.   Psychiatric/Behavioral: Negative.           Objective:  /80   Pulse 88   Temp (!) 96 °F (35.6 °C)   Resp 18   Pain Score:   5     Physical Exam  Vitals reviewed.   Constitutional:       General: She is not in acute distress.     Appearance: Normal appearance. She is well-developed and normal weight.   HENT:      Head: Normocephalic and atraumatic.   Cardiovascular:      Rate and Rhythm: Normal rate.   Pulmonary:      Effort: Pulmonary effort is normal.   Musculoskeletal:         General: No deformity.      Right lower leg: Edema present.      Left lower leg: Edema present.   Skin:     General: Skin is warm and dry.      Findings: Wound (RLE) present.             Comments: 1. Yellow slough on wound bed.  2. New open wound suspicious for skin cancer. See wound assessment   Neurological:      General: No focal deficit present.      Mental Status: She is alert and oriented to person, place, and time.      Gait: Gait normal.   Psychiatric:         Mood and Affect: Mood and affect normal.         Behavior: Behavior normal. Behavior is cooperative.                 Wound 11/27/23 Pretibial Right (Active)   Wound Image Images linked 12/28/23  "[id number]   Wound Description Yellow;Slough;Pink;Brown 12/28/23 1012   Gissell-wound Assessment Pink;Edema;Fragile 12/28/23 1012   Wound Length (cm) 1.7 cm 12/28/23 1012   Wound Width (cm) 1.2 cm 12/28/23 1012   Wound Depth (cm) 0.1 cm 12/28/23 1012   Wound Surface Area (cm^2) 2.04 cm^2 12/28/23 1012   Wound Volume (cm^3) 0.204 cm^3 12/28/23 1012   Calculated Wound Volume (cm^3) 0.2 cm^3 12/28/23 1012   Change in Wound Size % 72.22 12/28/23 1012   Drainage Amount Moderate 12/28/23 1012   Drainage Description Yellow;Serosanguineous 12/28/23 1012   Non-staged Wound Description Full thickness 12/28/23 1012   Dressing Status Intact 12/28/23 1012       Wound 12/28/23 Other (comment) Pretibial Right (Active)   Wound Image Images linked 12/28/23 1010   Wound Description Eschar;Black 12/28/23 1010   Gissell-wound Assessment Intact;Edema 12/28/23 1010   Wound Length (cm) 1.1 cm 12/28/23 1010   Wound Width (cm) 1.4 cm 12/28/23 1010   Wound Depth (cm) 0.1 cm 12/28/23 1010   Wound Surface Area (cm^2) 1.54 cm^2 12/28/23 1010   Wound Volume (cm^3) 0.154 cm^3 12/28/23 1010   Calculated Wound Volume (cm^3) 0.15 cm^3 12/28/23 1010   Drainage Amount Scant 12/28/23 1010   Drainage Description Serosanguineous 12/28/23 1010   Non-staged Wound Description Full thickness 12/28/23 1010   Dressing Status Intact 12/28/23 1010       Debridement   Wound 11/27/23 Pretibial Right    Universal Protocol:  Consent: Verbal consent obtained. Written consent obtained.  Risks and benefits: risks, benefits and alternatives were discussed  Consent given by: patient  Time out: Immediately prior to procedure a \"time out\" was called to verify the correct patient, procedure, equipment, support staff and site/side marked as required.  Patient understanding: patient states understanding of the procedure being performed  Patient identity confirmed: verbally with patient    Debridement Details  Performed by: PA  Debridement type: surgical  Level of debridement: " subcutaneous tissue  Pain control: lidocaine 4%      Post-debridement measurements  Length (cm): 1.7  Width (cm): 1.2  Depth (cm): 0.2  Percent debrided: 100%  Surface Area (cm^2): 2.04  Area Debrided (cm^2): 2.04  Volume (cm^3): 0.41    Tissue and other material debrided: subcutaneous tissue  Devitalized tissue debrided: biofilm, exudate and slough  Instrument(s) utilized: curette  Bleeding: small  Hemostasis obtained with: pressure  Procedural pain (0-10): 0  Post-procedural pain: 0   Response to treatment: procedure was tolerated well               Wound Instructions:  Orders Placed This Encounter   Procedures    Wound cleansing and dressings Right Pretibial     Wound cleansing and dressings                                        Right leg wound medial     Wash your hands with soap and water.  Remove old dressing, discard into plastic bag and place in trash.  Cleanse the wound with 5 to 10 minute soak of prophase moistened gauze prior to applying a clean dressing. Remove wet gauze. Do not use tissue or cotton balls. Do not scrub the wound. Pat dry using gauze.  Shower no      Right medial leg  Apply Calmoseptine to zach wound  Apply hydrofera blue ready cut to the open wound.  Cover with gauze (do not use Telfa non adherent dressing it traps drainage)   Secure with rolled gauze and tape  Change dressing three times a week     Standing Status:   Future     Standing Expiration Date:   12/28/2024    Wound cleansing and dressings Other (comment) (Open Wound ) Right Pretibial (Open )     New Right Leg lateral wound    Wash your hands with soap and water.  Remove old dressing, discard into plastic bag and place in trash.  Cleanse the wound with 5 to 10 minute prophase soak  prior to applying a clean dressing. Remove wet gauze. Do not use tissue or cotton balls. Do not scrub the wound. Pat dry using gauze.    Right new lateral wound  Apply Dermagran to the open wound  Secure with rolled gauze and tape  Change dressing  "there times a week     Standing Status:   Future     Standing Expiration Date:   12/28/2024    Debridement     This order was created via procedure documentation       Twyla Ga PA-C, [facility]      Portions of the record may have been created with voice recognition software. Occasional wrong word or \"sound alike\" substitutions may have occurred due to the inherent limitations of voice recognition software. Read the chart carefully and recognize, using context, where substitutions have occurred.    "

## 2023-12-28 NOTE — PATIENT INSTRUCTIONS
Orders Placed This Encounter   Procedures    Wound cleansing and dressings Right Pretibial     Wound cleansing and dressings                                        Right leg wound medial     Wash your hands with soap and water.  Remove old dressing, discard into plastic bag and place in trash.  Cleanse the wound with 5 to 10 minute soak of prophase moistened gauze prior to applying a clean dressing. Remove wet gauze. Do not use tissue or cotton balls. Do not scrub the wound. Pat dry using gauze.  Shower no      Right medial leg  Apply Calmoseptine to zach wound  Apply hydrofera blue ready cut to the open wound.  Cover with gauze (do not use Telfa non adherent dressing it traps drainage)   Secure with rolled gauze and tape  Change dressing three times a week     Standing Status:   Future     Standing Expiration Date:   12/28/2024    Wound cleansing and dressings Other (comment) (Open Wound ) Right Pretibial (Open )     New Right Leg lateral wound    Wash your hands with soap and water.  Remove old dressing, discard into plastic bag and place in trash.  Cleanse the wound with 5 to 10 minute prophase soak  prior to applying a clean dressing. Remove wet gauze. Do not use tissue or cotton balls. Do not scrub the wound. Pat dry using gauze.    Right new lateral wound  Apply Dermagran to the open wound  Secure with rolled gauze and tape  Change dressing there times a week     Standing Status:   Future     Standing Expiration Date:   12/28/2024

## 2024-01-03 ENCOUNTER — TELEPHONE (OUTPATIENT)
Dept: INTERNAL MEDICINE CLINIC | Facility: CLINIC | Age: 84
End: 2024-01-03

## 2024-01-03 NOTE — TELEPHONE ENCOUNTER
FYI: Patient missed her visit with  today patient is taking care of the wound herself and has a wound Center appt. They continue service next week.

## 2024-01-04 ENCOUNTER — OFFICE VISIT (OUTPATIENT)
Dept: WOUND CARE | Facility: CLINIC | Age: 84
End: 2024-01-04
Payer: MEDICARE

## 2024-01-04 VITALS
RESPIRATION RATE: 18 BRPM | DIASTOLIC BLOOD PRESSURE: 71 MMHG | SYSTOLIC BLOOD PRESSURE: 166 MMHG | TEMPERATURE: 97.7 F | HEART RATE: 78 BPM

## 2024-01-04 DIAGNOSIS — S81.801A OPEN WOUND OF RIGHT LOWER LEG, INITIAL ENCOUNTER: ICD-10-CM

## 2024-01-04 DIAGNOSIS — Z98.890 H/O MOHS MICROGRAPHIC SURGERY FOR SKIN CANCER: ICD-10-CM

## 2024-01-04 DIAGNOSIS — T81.89XA NON-HEALING SURGICAL WOUND, INITIAL ENCOUNTER: Primary | ICD-10-CM

## 2024-01-04 DIAGNOSIS — Z85.828 H/O MOHS MICROGRAPHIC SURGERY FOR SKIN CANCER: ICD-10-CM

## 2024-01-04 PROCEDURE — 99213 OFFICE O/P EST LOW 20 MIN: CPT | Performed by: ORTHOPAEDIC SURGERY

## 2024-01-04 NOTE — PROGRESS NOTES
Patient ID: Johana Rodriguez is a 83 y.o. female Date of Birth 1940       Chief Complaint   Patient presents with    Follow Up Wound Care Visit     Nonhealing surgical wound       Allergies:  Sulfa antibiotics and Latex    Diagnosis:   Diagnosis ICD-10-CM Associated Orders   1. Non-healing surgical wound, initial encounter  T81.89XA Wound cleansing and dressings Right Pretibial     Wound cleansing and dressings Other (comment) (Open Wound ) Right Pretibial (Open )      2. H/O Mohs micrographic surgery for skin cancer  Z85.828     Z98.890       3. Open wound of right lower leg, initial encounter  S81.801A            Assessment and Plan :  Follow-up evaluation non-healing surgical wound on RLE decreased in size and progressively healing. Open wound lateral to surgical wound remains unchanged, pt has an appointment with Dermatology on 1/17/24 to evaluate for SCC.   Ccontinue wound management with Hydorfera Blue to surgical wound and Dermagran to wound bed. See wound orders below   Continue to f/u with Rheumatology.  No harsh cleansers such as alcohol, peroxide, or antibacterial soap, do not submerge in water  Can cleanse with NSS at dressing changes.   Counseled on importance of frequent elevation of leg and increase exercise/walking level for wound healing.  Follow-up in 3 week(s) or call sooner with questions or concerns or any signs of infection such as redness, swelling, increased/purulent drainage, fever, chills, increased severe pain.         Subjective:   11/28: Patient is a 83 y.o. female with pmhx of hypothyroidism HLD, HTN, visual disturbance, Amaurosis fugax of left eye, h/o giant cell arteritis/temporal arteritis syndrome, Vitamin D deficiency, MDD/anxiety, GERD, hypothyroidism, polymyalgia rheumatica, BLE edema who presents for initial eval of RLE nonhealing surgical wound which has been present for about a month s/p MOHs procedure ib 10/25/23. Pt states she had sutures removed on 11/22/23. Pt was  admitted to hospital overnight (11/26-11/27) for observation and monitoring due to persistent pain, redness,drainage and burning sensation along incision site.  Pt was treated with doxycycline twice before and ciprofloxacin for an additional 7 days.She reports she has baseline redness and lower extremity swelling due to recent use of steroids.  Pt has been applying Silvasorb on the wound bed. Does not have an odor.No diabetes.  No smoking, ETOH or drug use.  Pt denies any sob, fatigue, N/V, CP, fever or chills.    12/12:  Pt here for f/u evaluation of non healing surgical wound on RLE s/p Mohs procedure. Pt has been applying Dermagran to wound bed and covering with Telfa (was instructed to wear DSD). Pt c/o increased drainage. Denies fevers, chills, increased severe pain.     12/28: Pt here for f/u evaluation of non-healing surgical wound on RLE s/p Mohs procedure. Pt states she has a new wound on her RLE from what she thinks is a scab she may have scratched off. Pt has been applying Maxorb Ag to wound bed.   Denies fevers, chills, increased severe pain.     1/4: Pt presents for f/u evaluation of non-healing surgical wound on RLE and open wound lateral to surgical wound suspicious for SCC. Pt was able to obtain an appointment with [facility] for January 17, 2024.  No complaints.  Pt has been applying Hydrofera Blue to surgical wound bed and Dermagran to other open wound. Denies fevers or chills.            The following portions of the patient's history were reviewed and updated as appropriate:   Patient Active Problem List   Diagnosis    Allergic rhinitis    Mixed hyperlipidemia    Hypertension, benign    Acquired hypothyroidism    Vitamin D deficiency    Depression    Current moderate episode of major depressive disorder without prior episode (HCC)    Polyarthritis with positive rheumatoid factor (HCC)    Visual disturbance    Amaurosis fugax of left eye    Temporal arteritis syndrome (HCC)    Drainage  from wound     Past Medical History:   Diagnosis Date    Depression 12/30/2021    Lumbar stenosis     Transient global amnesia      Past Surgical History:   Procedure Laterality Date    CATARACT EXTRACTION      MOHS SURGERY Right 10/25/2023    R Leg    NM LIGATION/BIOPSY TEMPORAL ARTERY Left 04/14/2023    Procedure: BIOPSY ARTERY TEMPORAL;  Surgeon: Carlos Lovelace MD;  Location: [facility];  Service: General     Family History   Problem Relation Age of Onset    Stroke Mother 60        CVA    Pancreatic cancer Father     Hyperlipidemia Sister      Social History     Socioeconomic History    Marital status: /Civil Union     Spouse name: None    Number of children: 3    Years of education: None    Highest education level: None   Occupational History    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   Tobacco Use    Smoking status: Former    Smokeless tobacco: Never   Vaping Use    Vaping status: Never Used   Substance and Sexual Activity    Alcohol use: Yes     Comment: glass white wine with dinner    Drug use: No    Sexual activity: Not Currently     Partners: Male   Other Topics Concern    None   Social History Narrative    None     Social Determinants of Health     Financial Resource Strain: Not on file   Food Insecurity: Not on file   Transportation Needs: Not on file   Physical Activity: Not on file   Stress: Not on file   Social Connections: Not on file   Intimate Partner Violence: Not on file   Housing Stability: Not on file       Current Outpatient Medications:     brimonidine tartrate 0.2 % ophthalmic solution, , Disp: , Rfl:     clobetasol (TEMOVATE) 0.05 % cream, Apply topically 2 (two) times a day, Disp: 45 g, Rfl: 3    fexofenadine (ALLEGRA) 180 MG tablet, Take 180 mg by mouth daily, Disp: , Rfl:     gabapentin (NEURONTIN) 100 mg capsule, Take 1 capsule (100 mg total) by mouth daily at bedtime, Disp: 30 capsule, Rfl: 3    levothyroxine 75 mcg tablet, Take 1 tablet (75 mcg total)  by mouth daily, Disp: 90 tablet, Rfl: 3    lisinopril (ZESTRIL) 5 mg tablet, Take 1 tablet (5 mg total) by mouth daily, Disp: 90 tablet, Rfl: 3    Lumigan 0.01 % ophthalmic drops, , Disp: , Rfl:     omeprazole (PriLOSEC) 40 MG capsule, TAKE 1 CAPSULE BY MOUTH DAILY, Disp: 90 capsule, Rfl: 3    sodium hypochlorite (DAKIN'S HALF-STRENGTH) external solution, Apply 1 Application topically daily Soak gauze and pack into wounds daily., Disp: 473 mL, Rfl: 0    traZODone (DESYREL) 50 mg tablet, TAKE 1 TABLET BY MOUTH DAILY AT  BEDTIME, Disp: 90 tablet, Rfl: 3    Review of Systems   Constitutional:  Negative for appetite change, chills, fatigue, fever and unexpected weight change.   HENT:  Negative for congestion, hearing loss and postnasal drip.    Respiratory:  Negative for cough and shortness of breath.    Cardiovascular:  Positive for leg swelling.   Skin:  Positive for wound (RLE). Negative for rash.   Neurological:  Negative for numbness.   Hematological:  Does not bruise/bleed easily.   Psychiatric/Behavioral: Negative.           Objective:  /71   Pulse 78   Temp 97.7 °F (36.5 °C)   Resp 18   Pain Score: 0-No pain     Physical Exam  Vitals reviewed.   Constitutional:       General: She is not in acute distress.     Appearance: Normal appearance. She is well-developed and normal weight.   HENT:      Head: Normocephalic and atraumatic.   Cardiovascular:      Rate and Rhythm: Normal rate.   Pulmonary:      Effort: Pulmonary effort is normal.   Musculoskeletal:         General: No deformity.      Right lower leg: Edema present.      Left lower leg: Edema present.   Skin:     General: Skin is warm and dry.      Findings: Wound (RLE) present.             Comments: 1. Beefy red wound bed.  2. Hypergranular tissue with eschar on irregular wound edges, sspicious for skin cancer. See wound assessment   Neurological:      General: No focal deficit present.      Mental Status: She is alert and oriented to person, place,  and time.      Gait: Gait normal.   Psychiatric:         Mood and Affect: Mood and affect normal.         Behavior: Behavior normal. Behavior is cooperative.               Wound 11/27/23 Pretibial Right (Active)   Wound Image Images linked (Simultaneous filing. User may not have seen previous data.) 01/04/24 1139   Wound Description Yellow;Slough;Pink;Granulation tissue 01/04/24 1139   Gissell-wound Assessment Edema;Fragile 01/04/24 1139   Wound Length (cm) 1.1 cm 01/04/24 1139   Wound Width (cm) 1.1 cm 01/04/24 1139   Wound Depth (cm) 0.1 cm 01/04/24 1139   Wound Surface Area (cm^2) 1.21 cm^2 01/04/24 1139   Wound Volume (cm^3) 0.121 cm^3 01/04/24 1139   Calculated Wound Volume (cm^3) 0.12 cm^3 01/04/24 1139   Change in Wound Size % 83.33 01/04/24 1139   Drainage Amount Moderate 01/04/24 1139   Drainage Description Serosanguineous 01/04/24 1139   Non-staged Wound Description Full thickness 01/04/24 1139   Dressing Status Intact 01/04/24 1139       Wound 12/28/23 Other (comment) Pretibial Right (Active)   Wound Image Images linked 01/04/24 1137   Wound Description Eschar;Pink;White 01/04/24 1138   Gissell-wound Assessment Edema;Fragile;[place]well 01/04/24 1138   Wound Length (cm) 1.9 cm 01/04/24 1138   Wound Width (cm) 1.1 cm 01/04/24 1138   Wound Depth (cm) 0.1 cm 01/04/24 1138   Wound Surface Area (cm^2) 2.09 cm^2 01/04/24 1138   Wound Volume (cm^3) 0.209 cm^3 01/04/24 1138   Calculated Wound Volume (cm^3) 0.21 cm^3 01/04/24 1138   Change in Wound Size % -40 01/04/24 1138   Drainage Amount Scant 01/04/24 1138   Drainage Description Serosanguineous 01/04/24 1138   Non-staged Wound Description Full thickness 01/04/24 1138   Dressing Status Intact 01/04/24 1138               Wound Instructions:  Orders Placed This Encounter   Procedures    Wound cleansing and dressings Right Pretibial     Wound cleansing and dressings                             Right leg wound medial     Wash your hands with soap and water.  Remove old  "dressing, discard into plastic bag and place in trash.  Cleanse the wound with normal saline wound wash prior to applying a clean dressing. Remove wet gauze. Do not use tissue or cotton balls. Do not scrub the wound. Pat dry using gauze.    Shower no      Right medial leg    Apply hydrofera blue ready cut to the open wound.  Cover with gauze (do not use Telfa non adherent dressing it traps drainage)   Secure with rolled gauze and tape  Change dressing three times a week     Standing Status:   Future     Standing Expiration Date:   1/4/2025    Wound cleansing and dressings Other (comment) (Open Wound ) Right Pretibial (Open )     · Wound cleansing and dressings Other (comment) (Open Wound ) Right Pretibial (Open )      New Right Leg lateral wound       Right new lateral wound    Apply Dermagran to the open wound  Secure with rolled gauze and tape  Change dressing there times a week    If dressing is sticking then wet with normal saline to remove so the area doesn't get trauma  Dont soak or rub this wound     Standing Status:   Future     Standing Expiration Date:   1/4/2025       Twyla Ga PA-C, [facility]      Portions of the record may have been created with voice recognition software. Occasional wrong word or \"sound alike\" substitutions may have occurred due to the inherent limitations of voice recognition software. Read the chart carefully and recognize, using context, where substitutions have occurred.    "

## 2024-01-04 NOTE — LETTER
[facility]  [address]  [address]  [place] [address]  Phone#  [phone]  Fax#  [phone]    Patient:  Johana Rodriguez  YOB: 1940  Phone:  [phone]  Date of Visit:  1/4/2024    Orders Placed This Encounter   Procedures   • Wound cleansing and dressings Right Pretibial     Wound cleansing and dressings                             Right leg wound medial     Wash your hands with soap and water.  Remove old dressing, discard into plastic bag and place in trash.  Cleanse the wound with normal saline wound wash prior to applying a clean dressing. Remove wet gauze. Do not use tissue or cotton balls. Do not scrub the wound. Pat dry using gauze.    Shower no      Right medial leg    Apply hydrofera blue ready cut to the open wound.  Cover with gauze (do not use Telfa non adherent dressing it traps drainage)   Secure with rolled gauze and tape  Change dressing three times a week     Standing Status:   Future     Standing Expiration Date:   1/4/2025   • Wound cleansing and dressings Other (comment) (Open Wound ) Right Pretibial (Open )     · Wound cleansing and dressings Other (comment) (Open Wound ) Right Pretibial (Open )      New Right Leg lateral wound       Right new lateral wound    Apply Dermagran to the open wound  Secure with rolled gauze and tape  Change dressing there times a week    If dressing is sticking then wet with normal saline to remove so the area doesn't get trauma  Dont soak or rub this wound     Standing Status:   Future     Standing Expiration Date:   1/4/2025         Electronically signed by Twyla Ga PA-C

## 2024-01-04 NOTE — PATIENT INSTRUCTIONS
Orders Placed This Encounter   Procedures    Wound cleansing and dressings Right Pretibial     Wound cleansing and dressings                             Right leg wound medial     Wash your hands with soap and water.  Remove old dressing, discard into plastic bag and place in trash.  Cleanse the wound with normal saline wound wash prior to applying a clean dressing. Remove wet gauze. Do not use tissue or cotton balls. Do not scrub the wound. Pat dry using gauze.    Shower no      Right medial leg    Apply hydrofera blue ready cut to the open wound.  Cover with gauze (do not use Telfa non adherent dressing it traps drainage)   Secure with rolled gauze and tape  Change dressing three times a week     Standing Status:   Future     Standing Expiration Date:   1/4/2025    Wound cleansing and dressings Other (comment) (Open Wound ) Right Pretibial (Open )     · Wound cleansing and dressings Other (comment) (Open Wound ) Right Pretibial (Open )      New Right Leg lateral wound       Right new lateral wound    Apply Dermagran to the open wound  Secure with rolled gauze and tape  Change dressing there times a week    If dressing is sticking then wet with normal saline to remove so the area doesn't get trauma  Dont soak or rub this wound     Standing Status:   Future     Standing Expiration Date:   1/4/2025

## 2024-01-22 ENCOUNTER — TELEPHONE (OUTPATIENT)
Dept: WOUND CARE | Facility: CLINIC | Age: 84
End: 2024-01-22

## 2024-01-22 NOTE — TELEPHONE ENCOUNTER
Patient called to report scab formation to wound. Would like to know if she can shower. Advised patient to await assessment during visit on 01/25/2024 before changing care protocol. Cannot advise without seeing wound to know if it remains open. Patient relayed understanding.

## 2024-01-23 ENCOUNTER — TELEPHONE (OUTPATIENT)
Dept: WOUND CARE | Facility: CLINIC | Age: 84
End: 2024-01-23

## 2024-01-23 NOTE — TELEPHONE ENCOUNTER
Patient would like to shower, report visiting nurse recommended patient leave wound open to air. Patient advised the center can not give her additional care instruction regarding the wound area until her re-assessment on Thursday. Patient reported understanding.

## 2024-01-24 ENCOUNTER — TELEPHONE (OUTPATIENT)
Dept: INTERNAL MEDICINE CLINIC | Facility: CLINIC | Age: 84
End: 2024-01-24

## 2024-01-24 DIAGNOSIS — M31.6 TEMPORAL ARTERITIS SYNDROME (HCC): Primary | ICD-10-CM

## 2024-01-24 NOTE — TELEPHONE ENCOUNTER
Patient would like your opinion on wether or  not she should do radiation versus mosse surgery due to patient not able to tolerate the pain and needles.   Patient would also like to know if you can give a lab order for the Giant cell...Sed Rate and the other one.    Please advise......    Please notify patient by phone.....

## 2024-01-25 ENCOUNTER — OFFICE VISIT (OUTPATIENT)
Dept: WOUND CARE | Facility: CLINIC | Age: 84
End: 2024-01-25
Payer: MEDICARE

## 2024-01-25 VITALS
HEART RATE: 84 BPM | RESPIRATION RATE: 18 BRPM | SYSTOLIC BLOOD PRESSURE: 168 MMHG | DIASTOLIC BLOOD PRESSURE: 74 MMHG | TEMPERATURE: 98 F

## 2024-01-25 DIAGNOSIS — Z85.828 H/O MOHS MICROGRAPHIC SURGERY FOR SKIN CANCER: ICD-10-CM

## 2024-01-25 DIAGNOSIS — Z98.890 H/O MOHS MICROGRAPHIC SURGERY FOR SKIN CANCER: ICD-10-CM

## 2024-01-25 DIAGNOSIS — S81.801A OPEN WOUND OF RIGHT LOWER LEG, INITIAL ENCOUNTER: ICD-10-CM

## 2024-01-25 DIAGNOSIS — T81.89XA NON-HEALING SURGICAL WOUND, INITIAL ENCOUNTER: Primary | ICD-10-CM

## 2024-01-25 PROCEDURE — 11042 DBRDMT SUBQ TIS 1ST 20SQCM/<: CPT | Performed by: ORTHOPAEDIC SURGERY

## 2024-01-25 RX ORDER — LIDOCAINE 40 MG/G
CREAM TOPICAL ONCE
Status: COMPLETED | OUTPATIENT
Start: 2024-01-25 | End: 2024-01-25

## 2024-01-25 RX ORDER — LIDOCAINE 40 MG/G
CREAM TOPICAL ONCE
Status: SHIPPED | OUTPATIENT
Start: 2024-01-25

## 2024-01-25 RX ADMIN — LIDOCAINE 1 APPLICATION: 40 CREAM TOPICAL at 11:31

## 2024-01-25 NOTE — PATIENT INSTRUCTIONS
Orders Placed This Encounter   Procedures    Wound cleansing and dressings Other (comment) (Open Wound ) Right Pretibial (Open )     [facility] to manage Right Lateral Lower extremity wound. Please continue to follow their instructions.     Standing Status:   Future     Standing Expiration Date:   1/25/2025    Wound cleansing and dressings Surgical Right;Medial Pretibial     Right Medial leg wound:     Wash your hands with soap and water.  Remove old dressing, discard into plastic bag and place in trash.  Cleanse the wound with mild soap and water prior to applying a clean dressing. Do not use tissue or cotton balls. Do not scrub the wound. Pat dry using gauze.  Shower yes     Cover and Secure wound with bordered foam dressing  Change dressing 3 times weekly.     The above was completed today at the wound center.     Standing Status:   Future     Standing Expiration Date:   1/25/2025

## 2024-01-25 NOTE — LETTER
[facility]  [address]  [address]  [place] [address]  Phone#  [phone]  Fax#  [phone]    Patient:  Johana Rodriguez  YOB: 1940  Phone:  [phone]  Date of Visit:  1/25/2024    Orders Placed This Encounter   Procedures   • Wound cleansing and dressings Other (comment) (Open Wound ) Right Pretibial (Open )     [facility] to manage Right Lateral Lower extremity wound. Please continue to follow their instructions.     Standing Status:   Future     Standing Expiration Date:   1/25/2025   • Wound cleansing and dressings Surgical Right;Medial Pretibial     Right Medial leg wound:     Wash your hands with soap and water.  Remove old dressing, discard into plastic bag and place in trash.  Cleanse the wound with mild soap and water prior to applying a clean dressing. Do not use tissue or cotton balls. Do not scrub the wound. Pat dry using gauze.  Shower yes     Cover and Secure wound with bordered foam dressing  Change dressing 3 times weekly.     The above was completed today at the wound center.     Standing Status:   Future     Standing Expiration Date:   1/25/2025         Electronically signed by Twyla Ga PA-C

## 2024-01-25 NOTE — PROGRESS NOTES
Patient ID: Johana Rodriguez is a 83 y.o. female Date of Birth 1940       Chief Complaint   Patient presents with    Follow Up Wound Care Visit     RLE wound       Allergies:  Sulfa antibiotics and Latex    Diagnosis:   Diagnosis ICD-10-CM Associated Orders   1. Non-healing surgical wound, initial encounter  T81.89XA lidocaine (LMX) 4 % cream     lidocaine (LMX) 4 % cream     Wound cleansing and dressings Other (comment) (Open Wound ) Right Pretibial (Open )     Wound cleansing and dressings Surgical Right;Medial Pretibial     Debridement Surgical Right;Medial Pretibial      2. H/O Mohs micrographic surgery for skin cancer  Z85.828 lidocaine (LMX) 4 % cream    Z98.890 lidocaine (LMX) 4 % cream     Wound cleansing and dressings Other (comment) (Open Wound ) Right Pretibial (Open )     Wound cleansing and dressings Surgical Right;Medial Pretibial      3. Open wound of right lower leg, initial encounter  S81.801A            Assessment and Plan :  Follow-up evaluation non-healing surgical wound on RLE almost healed. Open wound lateral to surgical wound suspicious for SCC being monitored and treated by [facility]. No notes in chart but pt states she is currently applying Mupirocin until procedure is attained. Continue to f/u with dermatology recommendations.  Change wound management to Allevyn life. See wound orders below   No harsh cleansers such as alcohol, peroxide, or antibacterial soap, do not submerge in water  Can cleanse with mild soap and water at dressing changes.   Counseled on importance of frequent elevation of leg and increase exercise/walking level for wound healing.  Continue to f/u with Dermatology.  Follow-up in 2 week(s) or call sooner with questions or concerns or any signs of infection such as redness, swelling, increased/purulent drainage, fever, chills, increased severe pain.      Subjective:   11/28: Patient is a 83 y.o. female with pmhx of hypothyroidism HLD, HTN, visual  disturbance, Amaurosis fugax of left eye, h/o giant cell arteritis/temporal arteritis syndrome, Vitamin D deficiency, MDD/anxiety, GERD, hypothyroidism, polymyalgia rheumatica, BLE edema who presents for initial eval of RLE nonhealing surgical wound which has been present for about a month s/p MOHs procedure ib 10/25/23. Pt states she had sutures removed on 11/22/23. Pt was admitted to hospital overnight (11/26-11/27) for observation and monitoring due to persistent pain, redness,drainage and burning sensation along incision site.  Pt was treated with doxycycline twice before and ciprofloxacin for an additional 7 days.She reports she has baseline redness and lower extremity swelling due to recent use of steroids.  Pt has been applying Silvasorb on the wound bed. Does not have an odor.No diabetes.  No smoking, ETOH or drug use.  Pt denies any sob, fatigue, N/V, CP, fever or chills.    12/12:  Pt here for f/u evaluation of non healing surgical wound on RLE s/p Mohs procedure. Pt has been applying Dermagran to wound bed and covering with Telfa (was instructed to wear DSD). Pt c/o increased drainage. Denies fevers, chills, increased severe pain.     12/28: Pt here for f/u evaluation of non-healing surgical wound on RLE s/p Mohs procedure. Pt states she has a new wound on her RLE from what she thinks is a scab she may have scratched off. Pt has been applying Maxorb Ag to wound bed.   Denies fevers, chills, increased severe pain.     1/4: Pt presents for f/u evaluation of non-healing surgical wound on RLE and open wound lateral to surgical wound suspicious for SCC. Pt was able to obtain an appointment with [facility] for January 17, 2024.  No complaints.  Pt has been applying Hydrofera Blue to surgical wound bed and Dermagran to other open wound. Denies fevers or chills.    1/25: Pt presents for f/u evaluation of non-healing surgical wound on RLE and open wound lateral to surgical wound suspicious for SCC. Pt was  able to obtain an appointment with [facility] for January 17, 2024.  No complaints.  Pt has been applying Hydrofera Blue to surgical wound bed and Dermagran to other open wound. Denies fevers or chills.      The following portions of the patient's history were reviewed and updated as appropriate:   Patient Active Problem List   Diagnosis    Allergic rhinitis    Mixed hyperlipidemia    Hypertension, benign    Acquired hypothyroidism    Vitamin D deficiency    Depression    Current moderate episode of major depressive disorder without prior episode (HCC)    Polyarthritis with positive rheumatoid factor (HCC)    Visual disturbance    Amaurosis fugax of left eye    Temporal arteritis syndrome (HCC)    Drainage from wound     Past Medical History:   Diagnosis Date    Depression 12/30/2021    Lumbar stenosis     Transient global amnesia      Past Surgical History:   Procedure Laterality Date    CATARACT EXTRACTION      MOHS SURGERY Right 10/25/2023    R Leg    ID LIGATION/BIOPSY TEMPORAL ARTERY Left 04/14/2023    Procedure: BIOPSY ARTERY TEMPORAL;  Surgeon: Carlos Lovelace MD;  Location: [facility];  Service: General     Family History   Problem Relation Age of Onset    Stroke Mother 60        CVA    Pancreatic cancer Father     Hyperlipidemia Sister      Social History     Socioeconomic History    Marital status: /Civil Union     Spouse name: None    Number of children: 3    Years of education: None    Highest education level: None   Occupational History    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   Tobacco Use    Smoking status: Former    Smokeless tobacco: Never   Vaping Use    Vaping status: Never Used   Substance and Sexual Activity    Alcohol use: Yes     Comment: glass white wine with dinner    Drug use: No    Sexual activity: Not Currently     Partners: Male   Other Topics Concern    None   Social History Narrative    None     Social Determinants of Health     Financial  Resource Strain: Not on file   Food Insecurity: Not on file   Transportation Needs: Not on file   Physical Activity: Not on file   Stress: Not on file   Social Connections: Not on file   Intimate Partner Violence: Not on file   Housing Stability: Not on file       Current Outpatient Medications:     brimonidine tartrate 0.2 % ophthalmic solution, , Disp: , Rfl:     clobetasol (TEMOVATE) 0.05 % cream, Apply topically 2 (two) times a day, Disp: 45 g, Rfl: 3    fexofenadine (ALLEGRA) 180 MG tablet, Take 180 mg by mouth daily, Disp: , Rfl:     gabapentin (NEURONTIN) 100 mg capsule, Take 1 capsule (100 mg total) by mouth daily at bedtime, Disp: 30 capsule, Rfl: 3    levothyroxine 75 mcg tablet, Take 1 tablet (75 mcg total) by mouth daily, Disp: 90 tablet, Rfl: 3    lisinopril (ZESTRIL) 5 mg tablet, Take 1 tablet (5 mg total) by mouth daily, Disp: 90 tablet, Rfl: 3    Lumigan 0.01 % ophthalmic drops, , Disp: , Rfl:     omeprazole (PriLOSEC) 40 MG capsule, TAKE 1 CAPSULE BY MOUTH DAILY, Disp: 90 capsule, Rfl: 3    sodium hypochlorite (DAKIN'S HALF-STRENGTH) external solution, Apply 1 Application topically daily Soak gauze and pack into wounds daily., Disp: 473 mL, Rfl: 0    traZODone (DESYREL) 50 mg tablet, TAKE 1 TABLET BY MOUTH DAILY AT  BEDTIME, Disp: 90 tablet, Rfl: 3    Current Facility-Administered Medications:     lidocaine (LMX) 4 % cream, , Topical, Once, Twyla Ga PA-C    Review of Systems   Constitutional:  Negative for appetite change, chills, fatigue, fever and unexpected weight change.   HENT:  Negative for congestion, hearing loss and postnasal drip.    Respiratory:  Negative for cough and shortness of breath.    Cardiovascular:  Positive for leg swelling.   Skin:  Positive for wound (RLE). Negative for rash.   Neurological:  Negative for numbness.   Hematological:  Does not bruise/bleed easily.   Psychiatric/Behavioral: Negative.           Objective:  /74   Pulse 84   Temp 98 °F (36.7 °C)    Resp 18   Pain Score: 0-No pain     Physical Exam  Vitals reviewed.   Constitutional:       General: She is not in acute distress.     Appearance: Normal appearance. She is well-developed and normal weight.   HENT:      Head: Normocephalic and atraumatic.   Cardiovascular:      Rate and Rhythm: Normal rate.   Pulmonary:      Effort: Pulmonary effort is normal.   Musculoskeletal:         General: No deformity.      Right lower leg: Edema present.      Left lower leg: Edema present.   Skin:     General: Skin is warm and dry.      Findings: Wound (RLE) present.             Comments: 1. Eschar and residual dressing on wound bed. See wound assessment  2. Open wound suspicious for SCC being monitored and treated by [facility]. No notes in chart but pt states she is currently applying Mupirocin until procedure is attained.   Neurological:      General: No focal deficit present.      Mental Status: She is alert and oriented to person, place, and time.      Gait: Gait normal.   Psychiatric:         Mood and Affect: Mood and affect normal.         Behavior: Behavior normal. Behavior is cooperative.                            Wound 11/27/23 Surgical Pretibial Right;Medial (Active)   Wound Description Brown;Eschar 01/25/24 1128   Gissell-wound Assessment Edema;Fragile 01/25/24 1128   Wound Length (cm) 2 cm 01/25/24 1128   Wound Width (cm) 1 cm 01/25/24 1128   Wound Depth (cm) 0 cm 01/25/24 1128   Wound Surface Area (cm^2) 2 cm^2 01/25/24 1128   Wound Volume (cm^3) 0 cm^3 01/25/24 1128   Calculated Wound Volume (cm^3) 0 cm^3 01/25/24 1128   Change in Wound Size % 100 01/25/24 1128   Drainage Amount None 01/25/24 1128   Drainage Description Serosanguineous 01/04/24 1139   Non-staged Wound Description Not applicable 01/25/24 1128   Dressing Status Intact 01/25/24 1128       Wound 12/28/23 Other (comment) Pretibial Right (Active)   Wound Description Hypergranulation;Yellow;Slough;Pink;Beefy red 01/25/24 1128   Gissell-wound  "Assessment Edema;Fragile;[place] 01/25/24 1128   Wound Length (cm) 1.9 cm 01/25/24 1128   Wound Width (cm) 1.4 cm 01/25/24 1128   Wound Depth (cm) 0.1 cm 01/25/24 1128   Wound Surface Area (cm^2) 2.66 cm^2 01/25/24 1128   Wound Volume (cm^3) 0.266 cm^3 01/25/24 1128   Calculated Wound Volume (cm^3) 0.27 cm^3 01/25/24 1128   Change in Wound Size % -80 01/25/24 1128   Drainage Amount Scant 01/25/24 1128   Drainage Description Serosanguineous 01/25/24 1128   Non-staged Wound Description Full thickness 01/25/24 1128   Dressing Status Intact 01/25/24 1128     Debridement   Wound 11/27/23 Surgical Pretibial Right;Medial    Universal Protocol:  Consent: Verbal consent obtained. Written consent obtained.  Risks and benefits: risks, benefits and alternatives were discussed  Consent given by: patient  Time out: Immediately prior to procedure a \"time out\" was called to verify the correct patient, procedure, equipment, support staff and site/side marked as required.  Patient understanding: patient states understanding of the procedure being performed  Patient identity confirmed: verbally with patient    Debridement Details  Performed by: PA  Debridement type: surgical  Level of debridement: subcutaneous tissue  Pain control: lidocaine 4%      Post-debridement measurements  Length (cm): 2  Width (cm): 1  Depth (cm): 0.1  Percent debrided: 100%  Surface Area (cm^2): 2  Area Debrided (cm^2): 2  Volume (cm^3): 0.2    Tissue and other material debrided: subcutaneous tissue  Devitalized tissue debrided: fibrin  Instrument(s) utilized: curette  Bleeding: small  Hemostasis obtained with: pressure  Procedural pain (0-10): 0  Post-procedural pain: 0   Response to treatment: procedure was tolerated well               Wound Instructions:  Orders Placed This Encounter   Procedures    Wound cleansing and dressings Other (comment) (Open Wound ) Right Pretibial (Open )     [facility] to manage Right Lateral Lower extremity wound. Please " "continue to follow their instructions.     Standing Status:   Future     Standing Expiration Date:   1/25/2025    Wound cleansing and dressings Surgical Right;Medial Pretibial     Right Medial leg wound:     Wash your hands with soap and water.  Remove old dressing, discard into plastic bag and place in trash.  Cleanse the wound with mild soap and water prior to applying a clean dressing. Do not use tissue or cotton balls. Do not scrub the wound. Pat dry using gauze.  Shower yes     Cover and Secure wound with bordered foam dressing  Change dressing 3 times weekly.     The above was completed today at the wound center.     Standing Status:   Future     Standing Expiration Date:   1/25/2025    Debridement Surgical Right;Medial Pretibial     This order was created via procedure documentation       Twyla Ga PA-C, [facility]      Portions of the record may have been created with voice recognition software. Occasional wrong word or \"sound alike\" substitutions may have occurred due to the inherent limitations of voice recognition software. Read the chart carefully and recognize, using context, where substitutions have occurred.    "

## 2024-01-25 NOTE — TELEPHONE ENCOUNTER
"As per Dr. Espinal, \"  I would think both will cause some pain.  Not sure I could say one over the other.  Would ask the specialist which is more likely to take care of the problem.  Deal with the short term side effects of the procedure if one is more likely to fix the problem.     Called patient and lmom advising her of this.  "

## 2024-01-29 ENCOUNTER — TELEPHONE (OUTPATIENT)
Dept: WOUND CARE | Facility: CLINIC | Age: 84
End: 2024-01-29

## 2024-01-29 NOTE — TELEPHONE ENCOUNTER
Patient called for clarification of wound care orders. Advised patient in reference to wound managed by center patient may shower. Orders were sent to her visiting nurse company, [employer]. Made patient aware that the visiting nurses are responsible for ordering and maintaining supplies. Patient stated understanding.

## 2024-01-30 ENCOUNTER — OFFICE VISIT (OUTPATIENT)
Dept: INTERNAL MEDICINE CLINIC | Facility: CLINIC | Age: 84
End: 2024-01-30
Payer: MEDICARE

## 2024-01-30 ENCOUNTER — APPOINTMENT (OUTPATIENT)
Dept: LAB | Facility: HOSPITAL | Age: 84
End: 2024-01-30
Payer: MEDICARE

## 2024-01-30 VITALS
BODY MASS INDEX: 22.26 KG/M2 | TEMPERATURE: 98.4 F | OXYGEN SATURATION: 98 % | RESPIRATION RATE: 18 BRPM | HEIGHT: 62 IN | WEIGHT: 121 LBS | HEART RATE: 82 BPM | SYSTOLIC BLOOD PRESSURE: 118 MMHG | DIASTOLIC BLOOD PRESSURE: 64 MMHG

## 2024-01-30 DIAGNOSIS — C44.92 SQUAMOUS CELL SKIN CANCER: ICD-10-CM

## 2024-01-30 DIAGNOSIS — Z00.00 MEDICARE ANNUAL WELLNESS VISIT, SUBSEQUENT: Primary | ICD-10-CM

## 2024-01-30 DIAGNOSIS — E03.9 ACQUIRED HYPOTHYROIDISM: ICD-10-CM

## 2024-01-30 DIAGNOSIS — M31.6 TEMPORAL ARTERITIS SYNDROME (HCC): ICD-10-CM

## 2024-01-30 DIAGNOSIS — I10 HYPERTENSION, BENIGN: ICD-10-CM

## 2024-01-30 LAB
ALBUMIN SERPL BCP-MCNC: 4.2 G/DL (ref 3.5–5)
ALP SERPL-CCNC: 77 U/L (ref 34–104)
ALT SERPL W P-5'-P-CCNC: 13 U/L (ref 7–52)
ANION GAP SERPL CALCULATED.3IONS-SCNC: 8 MMOL/L
AST SERPL W P-5'-P-CCNC: 15 U/L (ref 13–39)
BASOPHILS # BLD AUTO: 0.1 THOUSANDS/ÂΜL (ref 0–0.1)
BASOPHILS NFR BLD AUTO: 1 % (ref 0–1)
BILIRUB SERPL-MCNC: 0.42 MG/DL (ref 0.2–1)
BUN SERPL-MCNC: 20 MG/DL (ref 5–25)
CALCIUM SERPL-MCNC: 9.5 MG/DL (ref 8.4–10.2)
CHLORIDE SERPL-SCNC: 101 MMOL/L (ref 96–108)
CO2 SERPL-SCNC: 29 MMOL/L (ref 21–32)
CREAT SERPL-MCNC: 0.89 MG/DL (ref 0.6–1.3)
CRP SERPL QL: 10.5 MG/L
EOSINOPHIL # BLD AUTO: 0.23 THOUSAND/ÂΜL (ref 0–0.61)
EOSINOPHIL NFR BLD AUTO: 3 % (ref 0–6)
ERYTHROCYTE [DISTWIDTH] IN BLOOD BY AUTOMATED COUNT: 13.2 % (ref 11.6–15.1)
ERYTHROCYTE [SEDIMENTATION RATE] IN BLOOD: 34 MM/HOUR (ref 0–29)
GFR SERPL CREATININE-BSD FRML MDRD: 60 ML/MIN/1.73SQ M
GLUCOSE SERPL-MCNC: 89 MG/DL (ref 65–140)
HCT VFR BLD AUTO: 43 % (ref 34.8–46.1)
HGB BLD-MCNC: 13.7 G/DL (ref 11.5–15.4)
IMM GRANULOCYTES # BLD AUTO: 0.03 THOUSAND/UL (ref 0–0.2)
IMM GRANULOCYTES NFR BLD AUTO: 0 % (ref 0–2)
LYMPHOCYTES # BLD AUTO: 2.07 THOUSANDS/ÂΜL (ref 0.6–4.47)
LYMPHOCYTES NFR BLD AUTO: 22 % (ref 14–44)
MCH RBC QN AUTO: 29.8 PG (ref 26.8–34.3)
MCHC RBC AUTO-ENTMCNC: 31.9 G/DL (ref 31.4–37.4)
MCV RBC AUTO: 94 FL (ref 82–98)
MONOCYTES # BLD AUTO: 0.77 THOUSAND/ÂΜL (ref 0.17–1.22)
MONOCYTES NFR BLD AUTO: 8 % (ref 4–12)
NEUTROPHILS # BLD AUTO: 6.14 THOUSANDS/ÂΜL (ref 1.85–7.62)
NEUTS SEG NFR BLD AUTO: 66 % (ref 43–75)
NRBC BLD AUTO-RTO: 0 /100 WBCS
PLATELET # BLD AUTO: 278 THOUSANDS/UL (ref 149–390)
PMV BLD AUTO: 9.1 FL (ref 8.9–12.7)
POTASSIUM SERPL-SCNC: 4.5 MMOL/L (ref 3.5–5.3)
PROT SERPL-MCNC: 7.4 G/DL (ref 6.4–8.4)
RBC # BLD AUTO: 4.6 MILLION/UL (ref 3.81–5.12)
SODIUM SERPL-SCNC: 138 MMOL/L (ref 135–147)
T4 FREE SERPL-MCNC: 1.26 NG/DL (ref 0.61–1.12)
TSH SERPL DL<=0.05 MIU/L-ACNC: 1.45 UIU/ML (ref 0.45–4.5)
WBC # BLD AUTO: 9.34 THOUSAND/UL (ref 4.31–10.16)

## 2024-01-30 PROCEDURE — 85025 COMPLETE CBC W/AUTO DIFF WBC: CPT

## 2024-01-30 PROCEDURE — G0438 PPPS, INITIAL VISIT: HCPCS | Performed by: INTERNAL MEDICINE

## 2024-01-30 PROCEDURE — 99214 OFFICE O/P EST MOD 30 MIN: CPT | Performed by: INTERNAL MEDICINE

## 2024-01-30 PROCEDURE — 80053 COMPREHEN METABOLIC PANEL: CPT

## 2024-01-30 PROCEDURE — 36415 COLL VENOUS BLD VENIPUNCTURE: CPT

## 2024-01-30 PROCEDURE — 84443 ASSAY THYROID STIM HORMONE: CPT

## 2024-01-30 PROCEDURE — 86140 C-REACTIVE PROTEIN: CPT

## 2024-01-30 PROCEDURE — 84439 ASSAY OF FREE THYROXINE: CPT

## 2024-01-30 PROCEDURE — 85652 RBC SED RATE AUTOMATED: CPT

## 2024-01-30 NOTE — PROGRESS NOTES
Assessment and Plan:     Problem List Items Addressed This Visit        Endocrine    Acquired hypothyroidism    Relevant Orders    T4, free    TSH, 3rd generation       Cardiovascular and Mediastinum    Hypertension, benign    Relevant Orders    Comprehensive metabolic panel    CBC and differential    Temporal arteritis syndrome (HCC)       Musculoskeletal and Integument    Squamous cell skin cancer    Relevant Medications    mupirocin (BACTROBAN) 2 % ointment   Other Visit Diagnoses     Medicare annual wellness visit, subsequent    -  Primary      Continue follow-up with dermatology.  It does look like radiation is going to be her only option.  For her hypothyroidism, continue levothyroxine 75 mcg daily and recheck levels.  Her hypertension remains controlled with lisinopril 5 mg daily.  Recheck labs for her temporal arteritis and follow-up with rheumatology in April as planned.  Continue all other medications.    Continue followup with all specialists.   Continue diet and exercise.    Ordered labs for next visit.       Preventive health issues were discussed with patient, and age appropriate screening tests were ordered as noted in patient's After Visit Summary.  Personalized health advice and appropriate referrals for health education or preventive services given if needed, as noted in patient's After Visit Summary.     History of Present Illness:     Patient presents for a Medicare Wellness Visit    Patient was in today for routine follow-up and Medicare wellness.  She is seeing advanced dermatology now for her squamous cell skin cancer.  She reports that they wanted to do more Mohs procedures but she has so much discomfort and pain that the dermatologist had suggested radiation may be the better option.  She continues to follow with the wound care and those wounds are healing now.  Her blood pressure is controlled.  Her thyroid levels have been controlled but she is due for follow-up blood work.  She is  scheduled to see rheumatology in April and off of the steroids, she thinks she may be noticing the polymyalgia creeping back in.  No trouble with her vision in terms of the temporal arteritis.  No other complaints today.  No further additions to her history.       Patient Care Team:  Berry Espinal MD as PCP - General     Review of Systems:     Review of Systems   Respiratory:  Negative for shortness of breath.    Cardiovascular:  Negative for chest pain.   Gastrointestinal:  Negative for abdominal pain.        Problem List:     Patient Active Problem List   Diagnosis   • Allergic rhinitis   • Mixed hyperlipidemia   • Hypertension, benign   • Acquired hypothyroidism   • Vitamin D deficiency   • Depression   • Current moderate episode of major depressive disorder without prior episode (HCC)   • Polyarthritis with positive rheumatoid factor (HCC)   • Visual disturbance   • Amaurosis fugax of left eye   • Temporal arteritis syndrome (HCC)   • Drainage from wound   • Squamous cell skin cancer      Past Medical and Surgical History:     Past Medical History:   Diagnosis Date   • Depression 12/30/2021   • Lumbar stenosis    • Transient global amnesia      Past Surgical History:   Procedure Laterality Date   • CATARACT EXTRACTION     • MOHS SURGERY Right 10/25/2023    R Leg   • MT LIGATION/BIOPSY TEMPORAL ARTERY Left 04/14/2023    Procedure: BIOPSY ARTERY TEMPORAL;  Surgeon: Carlos Lovelace MD;  Location: [facility] OR;  Service: General      Family History:     Family History   Problem Relation Age of Onset   • Stroke Mother 60        CVA   • Pancreatic cancer Father    • Hyperlipidemia Sister       Social History:     Social History     Socioeconomic History   • Marital status: /Civil Union     Spouse name: None   • Number of children: 3   • Years of education: None   • Highest education level: None   Occupational History   • 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   Tobacco Use   • Smoking  status: Former   • Smokeless tobacco: Never   Vaping Use   • Vaping status: Never Used   Substance and Sexual Activity   • Alcohol use: Yes     Comment: glass white wine with dinner   • Drug use: No   • Sexual activity: Not Currently     Partners: Male   Other Topics Concern   • None   Social History Narrative   • None     Social Determinants of Health     Financial Resource Strain: Not on file   Food Insecurity: Not on file   Transportation Needs: Not on file   Physical Activity: Not on file   Stress: Not on file   Social Connections: Not on file   Intimate Partner Violence: Not on file   Housing Stability: Not on file      Medications and Allergies:     Current Outpatient Medications   Medication Sig Dispense Refill   • brimonidine tartrate 0.2 % ophthalmic solution      • fexofenadine (ALLEGRA) 180 MG tablet Take 180 mg by mouth daily     • gabapentin (NEURONTIN) 100 mg capsule Take 1 capsule (100 mg total) by mouth daily at bedtime 30 capsule 3   • levothyroxine 75 mcg tablet Take 1 tablet (75 mcg total) by mouth daily 90 tablet 3   • lisinopril (ZESTRIL) 5 mg tablet Take 1 tablet (5 mg total) by mouth daily 90 tablet 3   • Lumigan 0.01 % ophthalmic drops      • mupirocin (BACTROBAN) 2 % ointment APPLY TOPICALLY TO EACH DRESSING CHANGE     • traZODone (DESYREL) 50 mg tablet TAKE 1 TABLET BY MOUTH DAILY AT  BEDTIME 90 tablet 3   • clobetasol (TEMOVATE) 0.05 % cream Apply topically 2 (two) times a day (Patient not taking: Reported on 1/30/2024) 45 g 3   • omeprazole (PriLOSEC) 40 MG capsule TAKE 1 CAPSULE BY MOUTH DAILY (Patient not taking: Reported on 1/30/2024) 90 capsule 3   • sodium hypochlorite (DAKIN'S HALF-STRENGTH) external solution Apply 1 Application topically daily Soak gauze and pack into wounds daily. (Patient not taking: Reported on 1/30/2024) 473 mL 0     Current Facility-Administered Medications   Medication Dose Route Frequency Provider Last Rate Last Admin   • lidocaine (LMX) 4 % cream   Topical  Once Twyla Ga PA-C         Allergies   Allergen Reactions   • Sulfa Antibiotics Hives   • Latex Rash      Immunizations:     Immunization History   Administered Date(s) Administered   • COVID-19 MODERNA VACC 0.5 ML IM 02/10/2021, 03/09/2021      Health Maintenance:         Topic Date Due   • Colorectal Cancer Screening  01/07/2021         Topic Date Due   • Pneumococcal Vaccine: 65+ Years (1 - PCV) Never done   • COVID-19 Vaccine (3 - Moderna risk series) 04/06/2021   • Influenza Vaccine (1) Never done      Medicare Screening Tests and Risk Assessments:     Johana is here for her Subsequent Wellness visit. Last Medicare Wellness visit information reviewed, patient interviewed, no change since last AWV.     Health Risk Assessment:   Patient rates overall health as fair. Patient feels that their physical health rating is slightly worse. Patient is satisfied with their life. Eyesight was rated as slightly worse. Hearing was rated as same. Patient feels that their emotional and mental health rating is slightly worse. Patients states they are sometimes angry. Patient states they are sometimes unusually tired/fatigued. Pain experienced in the last 7 days has been some. Patient's pain rating has been 3/10. Patient states that she has experienced weight loss or gain in last 6 months.     Fall Risk Screening:   In the past year, patient has experienced: no history of falling in past year      Urinary Incontinence Screening:   Patient has leaked urine accidently in the last six months.     Home Safety:  Patient does not have trouble with stairs inside or outside of their home. Patient has working smoke alarms and has no working carbon monoxide detector. Home safety hazards include: none.     Nutrition:   Current diet is Regular.     Medications:   Patient is currently taking over-the-counter supplements. OTC medications include: see medication list. Patient is able to manage medications.     Activities of Daily Living  (ADLs)/Instrumental Activities of Daily Living (IADLs):   Walk and transfer into and out of bed and chair?: Yes  Dress and groom yourself?: Yes    Bathe or shower yourself?: Yes    Do your laundry/housekeeping?: Yes  Manage your money, pay your bills and track your expenses?: Yes  Make your own meals?: Yes    Do your own shopping?: Yes    Previous Hospitalizations:   Any hospitalizations or ED visits within the last 12 months?: Yes      Advance Care Planning:   Living will: Yes    Advanced directive: Yes    Advanced directive counseling given: Yes      Cognitive Screening:   Provider or family/friend/caregiver concerned regarding cognition?: No    PREVENTIVE SCREENINGS      Cardiovascular Screening:    General: Screening Not Indicated and History Lipid Disorder      Diabetes Screening:     General: Screening Current      Colorectal Cancer Screening:     General: Screening Not Indicated      Breast Cancer Screening:     General: Screening Not Indicated      Cervical Cancer Screening:    General: Screening Not Indicated      Osteoporosis Screening:    General: Screening Current      Abdominal Aortic Aneurysm (AAA) Screening:        General: Screening Not Indicated      Lung Cancer Screening:     General: Screening Not Indicated      Hepatitis C Screening:    General: Screening Not Indicated    Screening, Brief Intervention, and Referral to Treatment (SBIRT)    Screening  Typical number of drinks in a day: 0  Typical number of drinks in a week: 0  Interpretation: Low risk drinking behavior.    AUDIT-C Screenin) How often did you have a drink containing alcohol in the past year? never  2) How many drinks did you have on a typical day when you were drinking in the past year? 0  3) How often did you have 6 or more drinks on one occasion in the past year? never    AUDIT-C Score: 0  Interpretation: Score 0-2 (female): Negative screen for alcohol misuse    No results found.     Physical Exam:     /64 (BP Location:  "Left arm, Patient Position: Sitting, Cuff Size: Standard)   Pulse 82   Temp 98.4 °F (36.9 °C) (Tympanic)   Resp 18   Ht 5' 2\" (1.575 m)   Wt 54.9 kg (121 lb)   SpO2 98%   BMI 22.13 kg/m²     Physical Exam  Vitals and nursing note reviewed.   Constitutional:       Appearance: She is well-developed.   Cardiovascular:      Rate and Rhythm: Normal rate and regular rhythm.   Pulmonary:      Effort: Pulmonary effort is normal.      Breath sounds: Normal breath sounds.   Abdominal:      General: Abdomen is flat.      Tenderness: There is no abdominal tenderness.   Musculoskeletal:      Right lower leg: No edema.      Left lower leg: No edema.   Neurological:      Mental Status: She is alert and oriented to person, place, and time.   Psychiatric:         Behavior: Behavior normal.         Thought Content: Thought content normal.         Judgment: Judgment normal.          Berry Espinal MD  "

## 2024-01-30 NOTE — PATIENT INSTRUCTIONS
Medicare Preventive Visit Patient Instructions  Thank you for completing your Welcome to Medicare Visit or Medicare Annual Wellness Visit today. Your next wellness visit will be due in one year (1/30/2025).  The screening/preventive services that you may require over the next 5-10 years are detailed below. Some tests may not apply to you based off risk factors and/or age. Screening tests ordered at today's visit but not completed yet may show as past due. Also, please note that scanned in results may not display below.  Preventive Screenings:  Service Recommendations Previous Testing/Comments   Colorectal Cancer Screening  * Colonoscopy    * Fecal Occult Blood Test (FOBT)/Fecal Immunochemical Test (FIT)  * Fecal DNA/Cologuard Test  * Flexible Sigmoidoscopy Age: 45-75 years old   Colonoscopy: every 10 years (may be performed more frequently if at higher risk)  OR  FOBT/FIT: every 1 year  OR  Cologuard: every 3 years  OR  Sigmoidoscopy: every 5 years  Screening may be recommended earlier than age 45 if at higher risk for colorectal cancer. Also, an individualized decision between you and your healthcare provider will decide whether screening between the ages of 76-85 would be appropriate. Colonoscopy: Not on file  FOBT/FIT: Not on file  Cologuard: Not on file  Sigmoidoscopy: Not on file          Breast Cancer Screening Age: 40+ years old  Frequency: every 1-2 years  Not required if history of left and right mastectomy Mammogram: Not on file        Cervical Cancer Screening Between the ages of 21-29, pap smear recommended once every 3 years.   Between the ages of 30-65, can perform pap smear with HPV co-testing every 5 years.   Recommendations may differ for women with a history of total hysterectomy, cervical cancer, or abnormal pap smears in past. Pap Smear: Not on file    Screening Not Indicated   Hepatitis C Screening Once for adults born between 1945 and 1965  More frequently in patients at high risk for Hepatitis  C Hep C Antibody: Not on file        Diabetes Screening 1-2 times per year if you're at risk for diabetes or have pre-diabetes Fasting glucose: 96 mg/dL (11/27/2023)  A1C: No results in last 5 years (No results in last 5 years)  Screening Current   Cholesterol Screening Once every 5 years if you don't have a lipid disorder. May order more often based on risk factors. Lipid panel: 07/07/2023    Screening Not Indicated  History Lipid Disorder     Other Preventive Screenings Covered by Medicare:  Abdominal Aortic Aneurysm (AAA) Screening: covered once if your at risk. You're considered to be at risk if you have a family history of AAA.  Lung Cancer Screening: covers low dose CT scan once per year if you meet all of the following conditions: (1) Age 55-77; (2) No signs or symptoms of lung cancer; (3) Current smoker or have quit smoking within the last 15 years; (4) You have a tobacco smoking history of at least 20 pack years (packs per day multiplied by number of years you smoked); (5) You get a written order from a healthcare provider.  Glaucoma Screening: covered annually if you're considered high risk: (1) You have diabetes OR (2) Family history of glaucoma OR (3)  aged 50 and older OR (4)  American aged 65 and older  Osteoporosis Screening: covered every 2 years if you meet one of the following conditions: (1) You're estrogen deficient and at risk for osteoporosis based off medical history and other findings; (2) Have a vertebral abnormality; (3) On glucocorticoid therapy for more than 3 months; (4) Have primary hyperparathyroidism; (5) On osteoporosis medications and need to assess response to drug therapy.   Last bone density test (DXA Scan): 07/19/2022.  HIV Screening: covered annually if you're between the age of 15-65. Also covered annually if you are younger than 15 and older than 65 with risk factors for HIV infection. For pregnant patients, it is covered up to 3 times per  pregnancy.    Immunizations:  Immunization Recommendations   Influenza Vaccine Annual influenza vaccination during flu season is recommended for all persons aged >= 6 months who do not have contraindications   Pneumococcal Vaccine   * Pneumococcal conjugate vaccine = PCV13 (Prevnar 13), PCV15 (Vaxneuvance), PCV20 (Prevnar 20)  * Pneumococcal polysaccharide vaccine = PPSV23 (Pneumovax) Adults 19-63 yo with certain risk factors or if 65+ yo  If never received any pneumonia vaccine: recommend Prevnar 20 (PCV20)  Give PCV20 if previously received 1 dose of PCV13 or PPSV23   Hepatitis B Vaccine 3 dose series if at intermediate or high risk (ex: diabetes, end stage renal disease, liver disease)   Respiratory syncytial virus (RSV) Vaccine - COVERED BY MEDICARE PART D  * RSVPreF3 (Arexvy) CDC recommends that adults 60 years of age and older may receive a single dose of RSV vaccine using shared clinical decision-making (SCDM)   Tetanus (Td) Vaccine - COST NOT COVERED BY MEDICARE PART B Following completion of primary series, a booster dose should be given every 10 years to maintain immunity against tetanus. Td may also be given as tetanus wound prophylaxis.   Tdap Vaccine - COST NOT COVERED BY MEDICARE PART B Recommended at least once for all adults. For pregnant patients, recommended with each pregnancy.   Shingles Vaccine (Shingrix) - COST NOT COVERED BY MEDICARE PART B  2 shot series recommended in those 19 years and older who have or will have weakened immune systems or those 50 years and older     Health Maintenance Due:      Topic Date Due   • Colorectal Cancer Screening  01/07/2021     Immunizations Due:      Topic Date Due   • Pneumococcal Vaccine: 65+ Years (1 - PCV) Never done   • COVID-19 Vaccine (3 - Moderna risk series) 04/06/2021   • Influenza Vaccine (1) Never done     Advance Directives   What are advance directives?  Advance directives are legal documents that state your wishes and plans for medical care.  These plans are made ahead of time in case you lose your ability to make decisions for yourself. Advance directives can apply to any medical decision, such as the treatments you want, and if you want to donate organs.   What are the types of advance directives?  There are many types of advance directives, and each state has rules about how to use them. You may choose a combination of any of the following:  Living will:  This is a written record of the treatment you want. You can also choose which treatments you do not want, which to limit, and which to stop at a certain time. This includes surgery, medicine, IV fluid, and tube feedings.   Durable power of  for healthcare (DPAHC):  This is a written record that states who you want to make healthcare choices for you when you are unable to make them for yourself. This person, called a proxy, is usually a family member or a friend. You may choose more than 1 proxy.  Do not resuscitate (DNR) order:  A DNR order is used in case your heart stops beating or you stop breathing. It is a request not to have certain forms of treatment, such as CPR. A DNR order may be included in other types of advance directives.  Medical directive:  This covers the care that you want if you are in a coma, near death, or unable to make decisions for yourself. You can list the treatments you want for each condition. Treatment may include pain medicine, surgery, blood transfusions, dialysis, IV or tube feedings, and a ventilator (breathing machine).  Values history:  This document has questions about your views, beliefs, and how you feel and think about life. This information can help others choose the care that you would choose.  Why are advance directives important?  An advance directive helps you control your care. Although spoken wishes may be used, it is better to have your wishes written down. Spoken wishes can be misunderstood, or not followed. Treatments may be given even if you  do not want them. An advance directive may make it easier for your family to make difficult choices about your care.   Urinary Incontinence   Urinary incontinence (UI)  is when you lose control of your bladder. UI develops because your bladder cannot store or empty urine properly. The 3 most common types of UI are stress incontinence, urge incontinence, or both.  Medicines:   May be given to help strengthen your bladder control. Report any side effects of medication to your healthcare provider.  Do pelvic muscle exercises often:  Your pelvic muscles help you stop urinating. Squeeze these muscles tight for 5 seconds, then relax for 5 seconds. Gradually work up to squeezing for 10 seconds. Do 3 sets of 15 repetitions a day, or as directed. This will help strengthen your pelvic muscles and improve bladder control.  Train your bladder:  Go to the bathroom at set times, such as every 2 hours, even if you do not feel the urge to go. You can also try to hold your urine when you feel the urge to go. For example, hold your urine for 5 minutes when you feel the urge to go. As that becomes easier, hold your urine for 10 minutes.   Self-care:   Keep a UI record.  Write down how often you leak urine and how much you leak. Make a note of what you were doing when you leaked urine.  Drink liquids as directed. You may need to limit the amount of liquid you drink to help control your urine leakage. Do not drink any liquid right before you go to bed. Limit or do not have drinks that contain caffeine or alcohol.   Prevent constipation.  Eat a variety of high-fiber foods. Good examples are high-fiber cereals, beans, vegetables, and whole-grain breads. Walking is the best way to trigger your intestines to have a bowel movement.  Exercise regularly and maintain a healthy weight.  Weight loss and exercise will decrease pressure on your bladder and help you control your leakage.   Use a catheter as directed  to help empty your bladder. A  catheter is a tiny, plastic tube that is put into your bladder to drain your urine.   Go to behavior therapy as directed.  Behavior therapy may be used to help you learn to control your urge to urinate.     © Copyright [employer] 2018 Information is for End User's use only and may not be sold, redistributed or otherwise used for commercial purposes. All illustrations and images included in CareNotes® are the copyrighted property of [employer] Inc[employer] or [employer]

## 2024-02-01 ENCOUNTER — TELEPHONE (OUTPATIENT)
Dept: INTERNAL MEDICINE CLINIC | Facility: CLINIC | Age: 84
End: 2024-02-01

## 2024-02-01 DIAGNOSIS — L08.9 WOUND INFECTION: ICD-10-CM

## 2024-02-01 DIAGNOSIS — T14.8XXA WOUND INFECTION: ICD-10-CM

## 2024-02-01 RX ORDER — GABAPENTIN 100 MG/1
100 CAPSULE ORAL 2 TIMES DAILY
Qty: 180 CAPSULE | Refills: 1 | Status: SHIPPED | OUTPATIENT
Start: 2024-02-01

## 2024-02-01 NOTE — TELEPHONE ENCOUNTER
Patient acknowledged     Gabapentin should she continue taking only at bedtime or BID, because she is having pain during the day as well as tenderness and soreness... please send to [facility] for refills if you decide to keep her on or change her RX.

## 2024-02-01 NOTE — TELEPHONE ENCOUNTER
----- Message from Berry Espinal MD sent at 1/31/2024 12:10 PM EST -----  Call patient, labs look okay.  Inflammatory markers are coming down.  1 thyroid test is off just a hair so I am going to leave the dose alone for now.

## 2024-02-08 ENCOUNTER — OFFICE VISIT (OUTPATIENT)
Dept: WOUND CARE | Facility: CLINIC | Age: 84
End: 2024-02-08
Payer: MEDICARE

## 2024-02-08 VITALS
TEMPERATURE: 98.2 F | RESPIRATION RATE: 18 BRPM | SYSTOLIC BLOOD PRESSURE: 172 MMHG | DIASTOLIC BLOOD PRESSURE: 76 MMHG | HEART RATE: 80 BPM

## 2024-02-08 DIAGNOSIS — Z98.890 H/O MOHS MICROGRAPHIC SURGERY FOR SKIN CANCER: ICD-10-CM

## 2024-02-08 DIAGNOSIS — S81.801A OPEN WOUND OF RIGHT LOWER LEG, INITIAL ENCOUNTER: ICD-10-CM

## 2024-02-08 DIAGNOSIS — T81.89XA NON-HEALING SURGICAL WOUND, INITIAL ENCOUNTER: Primary | ICD-10-CM

## 2024-02-08 DIAGNOSIS — Z85.828 H/O MOHS MICROGRAPHIC SURGERY FOR SKIN CANCER: ICD-10-CM

## 2024-02-08 PROCEDURE — 99212 OFFICE O/P EST SF 10 MIN: CPT | Performed by: ORTHOPAEDIC SURGERY

## 2024-02-08 NOTE — PATIENT INSTRUCTIONS
Orders Placed This Encounter   Procedures    Wound cleansing and dressings Surgical Right;Medial Pretibial     Your Right Pretibial wound was healed today at the wound center.     Continue to keep the area clean, dry and intact.     Thank you for using the wound care center.     Standing Status:   Future     Standing Expiration Date:   2/8/2025

## 2024-02-08 NOTE — PROGRESS NOTES
Patient ID: Johana Rodriguez is a 84 y.o. female Date of Birth 1940       Chief Complaint   Patient presents with    Follow Up Wound Care Visit     RLE Wound       Allergies:  Sulfa antibiotics and Latex    Diagnosis:   Diagnosis ICD-10-CM Associated Orders   1. Non-healing surgical wound, initial encounter  T81.89XA Wound cleansing and dressings Surgical Right;Medial Pretibial      2. H/O Mohs micrographic surgery for skin cancer  Z85.828     Z98.890       3. Open wound of right lower leg, initial encounter  S81.801A            Assessment and Plan :  Follow-up evaluation non-healing surgical wound on RLE is completely epithelialized. Open wound on lateral aspect of RLE being monitored and treated by [facility].  Moisturize skin daily with skin nourishing cream.   Counseled on the importance of following [facility] for SCC.  Follow up as needed or call with questions or concerns    Subjective:   11/28: Patient is a 83 y.o. female with pmhx of hypothyroidism HLD, HTN, visual disturbance, Amaurosis fugax of left eye, h/o giant cell arteritis/temporal arteritis syndrome, Vitamin D deficiency, MDD/anxiety, GERD, hypothyroidism, polymyalgia rheumatica, BLE edema who presents for initial eval of RLE nonhealing surgical wound which has been present for about a month s/p MOHs procedure ib 10/25/23. Pt states she had sutures removed on 11/22/23. Pt was admitted to hospital overnight (11/26-11/27) for observation and monitoring due to persistent pain, redness,drainage and burning sensation along incision site.  Pt was treated with doxycycline twice before and ciprofloxacin for an additional 7 days.She reports she has baseline redness and lower extremity swelling due to recent use of steroids.  Pt has been applying Silvasorb on the wound bed. Does not have an odor.No diabetes.  No smoking, ETOH or drug use.  Pt denies any sob, fatigue, N/V, CP, fever or chills.    12/12:  Pt here for f/u evaluation of  non healing surgical wound on RLE s/p Mohs procedure. Pt has been applying Dermagran to wound bed and covering with Telfa (was instructed to wear DSD). Pt c/o increased drainage. Denies fevers, chills, increased severe pain.     12/28: Pt here for f/u evaluation of non-healing surgical wound on RLE s/p Mohs procedure. Pt states she has a new wound on her RLE from what she thinks is a scab she may have scratched off. Pt has been applying Maxorb Ag to wound bed.   Denies fevers, chills, increased severe pain.     1/4: Pt presents for f/u evaluation of non-healing surgical wound on RLE and open wound lateral to surgical wound suspicious for SCC. Pt was able to obtain an appointment with [facility] for January 17, 2024.  No complaints.  Pt has been applying Hydrofera Blue to surgical wound bed and Dermagran to other open wound. Denies fevers or chills.    1/25: Pt presents for f/u evaluation of non-healing surgical wound on RLE and open wound lateral to surgical wound suspicious for SCC. Pt was able to obtain an appointment with [facility] for January 17, 2024.  No complaints.  Pt has been applying Hydrofera Blue to surgical wound bed and Dermagran to other open wound. Denies fevers or chills.    2/8: Pt presents for f/u evaluation of non-healing surgical wound on RLE. No complaints. Pt has been covering wound bed with Allevyn life. Pt is following with [facility] for wound on lateral aspect of RLE and states she was instructed to continue applying Mupirocin until procedure is attained. she will continue to f/u with dermatology recommendations.   Denies fevers or chills.           The following portions of the patient's history were reviewed and updated as appropriate:   Patient Active Problem List   Diagnosis    Allergic rhinitis    Mixed hyperlipidemia    Hypertension, benign    Acquired hypothyroidism    Vitamin D deficiency    Depression    Current moderate episode of major depressive  disorder without prior episode (HCC)    Polyarthritis with positive rheumatoid factor (HCC)    Visual disturbance    Amaurosis fugax of left eye    Temporal arteritis syndrome (HCC)    Drainage from wound    Squamous cell skin cancer     Past Medical History:   Diagnosis Date    Depression 12/30/2021    Lumbar stenosis     Transient global amnesia      Past Surgical History:   Procedure Laterality Date    CATARACT EXTRACTION      MOHS SURGERY Right 10/25/2023    R Leg    CO LIGATION/BIOPSY TEMPORAL ARTERY Left 04/14/2023    Procedure: BIOPSY ARTERY TEMPORAL;  Surgeon: Carlos Lovelace MD;  Location: [facility];  Service: General     Family History   Problem Relation Age of Onset    Stroke Mother 60        CVA    Pancreatic cancer Father     Hyperlipidemia Sister      Social History     Socioeconomic History    Marital status: /Civil Union     Spouse name: None    Number of children: 3    Years of education: None    Highest education level: None   Occupational History    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   Tobacco Use    Smoking status: Former    Smokeless tobacco: Never   Vaping Use    Vaping status: Never Used   Substance and Sexual Activity    Alcohol use: Yes     Comment: glass white wine with dinner    Drug use: No    Sexual activity: Not Currently     Partners: Male   Other Topics Concern    None   Social History Narrative    None     Social Determinants of Health     Financial Resource Strain: Not on file   Food Insecurity: Not on file   Transportation Needs: Not on file   Physical Activity: Not on file   Stress: Not on file   Social Connections: Not on file   Intimate Partner Violence: Not on file   Housing Stability: Not on file       Current Outpatient Medications:     brimonidine tartrate 0.2 % ophthalmic solution, , Disp: , Rfl:     clobetasol (TEMOVATE) 0.05 % cream, Apply topically 2 (two) times a day (Patient not taking: Reported on 1/30/2024), Disp: 45 g, Rfl: 3     fexofenadine (ALLEGRA) 180 MG tablet, Take 180 mg by mouth daily, Disp: , Rfl:     gabapentin (NEURONTIN) 100 mg capsule, Take 1 capsule (100 mg total) by mouth 2 (two) times a day, Disp: 180 capsule, Rfl: 1    levothyroxine 75 mcg tablet, Take 1 tablet (75 mcg total) by mouth daily, Disp: 90 tablet, Rfl: 3    lisinopril (ZESTRIL) 5 mg tablet, Take 1 tablet (5 mg total) by mouth daily, Disp: 90 tablet, Rfl: 3    Lumigan 0.01 % ophthalmic drops, , Disp: , Rfl:     mupirocin (BACTROBAN) 2 % ointment, APPLY TOPICALLY TO EACH DRESSING CHANGE, Disp: , Rfl:     omeprazole (PriLOSEC) 40 MG capsule, TAKE 1 CAPSULE BY MOUTH DAILY (Patient not taking: Reported on 1/30/2024), Disp: 90 capsule, Rfl: 3    sodium hypochlorite (DAKIN'S HALF-STRENGTH) external solution, Apply 1 Application topically daily Soak gauze and pack into wounds daily. (Patient not taking: Reported on 1/30/2024), Disp: 473 mL, Rfl: 0    traZODone (DESYREL) 50 mg tablet, TAKE 1 TABLET BY MOUTH DAILY AT  BEDTIME, Disp: 90 tablet, Rfl: 3    Current Facility-Administered Medications:     lidocaine (LMX) 4 % cream, , Topical, Once, Twyla Ga PA-C    Review of Systems   Constitutional:  Negative for appetite change, chills, fatigue, fever and unexpected weight change.   HENT:  Negative for congestion, hearing loss and postnasal drip.    Respiratory:  Negative for cough and shortness of breath.    Cardiovascular:  Positive for leg swelling.   Skin:  Positive for wound (RLE). Negative for rash.   Neurological:  Negative for numbness.   Hematological:  Does not bruise/bleed easily.   Psychiatric/Behavioral: Negative.           Objective:  BP (!) 172/76   Pulse 80   Temp 98.2 °F (36.8 °C)   Resp 18   Pain Score: 0-No pain     Physical Exam  Vitals reviewed.   Constitutional:       General: She is not in acute distress.     Appearance: Normal appearance. She is well-developed and normal weight.   HENT:      Head: Normocephalic and atraumatic.   Cardiovascular:       Rate and Rhythm: Normal rate.   Pulmonary:      Effort: Pulmonary effort is normal.   Musculoskeletal:         General: No deformity.      Right lower leg: Edema present.      Left lower leg: Edema present.   Skin:     General: Skin is warm and dry.      Findings: Wound (RLE) present.             Comments: 1. completely epithelialized.   2. Black eschar and mupirocin on wound bed.     Neurological:      General: No focal deficit present.      Mental Status: She is alert and oriented to person, place, and time.      Gait: Gait normal.   Psychiatric:         Mood and Affect: Mood and affect normal.         Behavior: Behavior normal. Behavior is cooperative.               Wound 11/27/23 Surgical Pretibial Right;Medial (Active)   Wound Image Images linked 02/08/24 1035   Wound Description Epithelialization 02/08/24 1035   Gissell-wound Assessment Edema;Fragile 02/08/24 1035   Wound Length (cm) 0 cm 02/08/24 1035   Wound Width (cm) 0 cm 02/08/24 1035   Wound Depth (cm) 0 cm 02/08/24 1035   Wound Surface Area (cm^2) 0 cm^2 02/08/24 1035   Wound Volume (cm^3) 0 cm^3 02/08/24 1035   Calculated Wound Volume (cm^3) 0 cm^3 02/08/24 1035   Change in Wound Size % 100 02/08/24 1035   Drainage Amount None 02/08/24 1035   Drainage Description Unable to assess 02/08/24 1035   Non-staged Wound Description Not applicable 02/08/24 1035   Dressing Status Intact 02/08/24 1035       Wound 12/28/23 Other (comment) Pretibial Right (Active)   Wound Image Images linked 02/08/24 1036   Wound Description Eschar 02/08/24 1036   Gissell-wound Assessment Edema;Fragile;Pink 02/08/24 1036   Wound Length (cm) 1.6 cm 02/08/24 1036   Wound Width (cm) 1.2 cm 02/08/24 1036   Wound Depth (cm) 0 cm 02/08/24 1036   Wound Surface Area (cm^2) 1.92 cm^2 02/08/24 1036   Wound Volume (cm^3) 0 cm^3 02/08/24 1036   Calculated Wound Volume (cm^3) 0 cm^3 02/08/24 1036   Change in Wound Size % 100 02/08/24 1036   Drainage Amount Scant 02/08/24 1036   Drainage  "Description Serosanguineous 02/08/24 1036   Non-staged Wound Description Full thickness 02/08/24 1036   Dressing Status Intact 02/08/24 1036                    Wound Instructions:  Orders Placed This Encounter   Procedures    Wound cleansing and dressings Surgical Right;Medial Pretibial     Your Right Pretibial wound was healed today at the wound center.     Continue to keep the area clean, dry and intact.     Thank you for using the wound care center.     Standing Status:   Future     Standing Expiration Date:   2/8/2025       Twyla Ga PA-C, [facility]      Portions of the record may have been created with voice recognition software. Occasional wrong word or \"sound alike\" substitutions may have occurred due to the inherent limitations of voice recognition software. Read the chart carefully and recognize, using context, where substitutions have occurred.    "

## 2024-02-12 ENCOUNTER — TELEPHONE (OUTPATIENT)
Dept: INTERNAL MEDICINE CLINIC | Facility: CLINIC | Age: 84
End: 2024-02-12

## 2024-02-12 NOTE — TELEPHONE ENCOUNTER
Call from Mikayla ([facility]).  Looking for home care orders for skilled nursing and wound care - request was sent to us.  Mikayla can be reached at [phone] with any questions.

## 2024-02-16 ENCOUNTER — TELEPHONE (OUTPATIENT)
Dept: INTERNAL MEDICINE CLINIC | Facility: CLINIC | Age: 84
End: 2024-02-16

## 2024-02-16 NOTE — TELEPHONE ENCOUNTER
If received have dr jessica Hastings afternoon. This is Susan. I'm calling from [facility]. I'm calling to check on the reception of an order that we have recently faxed for Mr. for Mrs. Barbra Sánchez. The patient's last name is spelled S as in Beni T as in [place] as in Stanley Z as in [place]. Her YOB: 1940. We have sent our plan of care that is showing pending signature. The order date is November 30th and it was faxed first time on December 11th. The order number is [id number] and the coworker reflects the copy of the order on February 8th. Today, just in case, I will be faxing copy of the order to the fax number we have on record [phone] and I will include my information on the cover sheet in case that you have any questions or if you need to reach me. My phone number is [phone] and in case that this sort of hassle between received if the order has already been signed, please disregard my copy and please if you could please re fax a copy of the signed order to our alternative fax number it's gonna come directly to me. My fax number is [phone]. This is a plan of care for skilled nursing certification period 11/30 to 1/28/2020. Homer and doctor Espinal signed an order for this patient order their December 2nd. That was the discharge. So we need the previous one to be signed and dated as soon as possible. Thank you so much. I do appreciate your help and I hope you have a great day. Have a great weekend too. Thank you.

## 2024-04-15 ENCOUNTER — OFFICE VISIT (OUTPATIENT)
Dept: RHEUMATOLOGY | Facility: CLINIC | Age: 84
End: 2024-04-15
Payer: MEDICARE

## 2024-04-15 VITALS
DIASTOLIC BLOOD PRESSURE: 80 MMHG | SYSTOLIC BLOOD PRESSURE: 128 MMHG | HEART RATE: 76 BPM | HEIGHT: 62 IN | WEIGHT: 121 LBS | OXYGEN SATURATION: 97 % | TEMPERATURE: 98.1 F | BODY MASS INDEX: 22.26 KG/M2

## 2024-04-15 DIAGNOSIS — R70.0 ELEVATED SED RATE (ELEV SR): ICD-10-CM

## 2024-04-15 DIAGNOSIS — M18.0 ARTHRITIS OF CARPOMETACARPAL (CMC) JOINT OF BOTH THUMBS: ICD-10-CM

## 2024-04-15 DIAGNOSIS — M31.6 TEMPORAL ARTERITIS SYNDROME (HCC): Primary | ICD-10-CM

## 2024-04-15 DIAGNOSIS — G45.3 AMAUROSIS FUGAX OF LEFT EYE: ICD-10-CM

## 2024-04-15 DIAGNOSIS — R79.82 ELEVATED C-REACTIVE PROTEIN (CRP): ICD-10-CM

## 2024-04-15 PROCEDURE — 99214 OFFICE O/P EST MOD 30 MIN: CPT | Performed by: PHYSICIAN ASSISTANT

## 2024-04-15 RX ORDER — FLUTICASONE PROPIONATE 50 MCG
SPRAY, SUSPENSION (ML) NASAL
COMMUNITY

## 2024-04-15 NOTE — PROGRESS NOTES
Assessment and Plan:   Ms. Rodriguez is an 84-year-old female who presents for rheumatology follow-up of temporal artery biopsy-proven GCA and amaurosis fugax of left eye.     The patient was initially started on prednisone taper in March 2023.  The patient was complaining of side effects from the steroids including edema, oral thrush, etc., therefore steroids were tapered down quickly.  She was not agreeable to high risk medications such as MTX or Actemra, therefore was started on HCQ, which was discontinued due to rash of the lower extremity which developed.  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  She has undergone radiation therapy and Mohs surgery and is still experiencing multiple nonhealing wounds.    Fortunately, she is asymptomatic from a GCA and PMR standpoint and does not have any signs or symptoms concerning for inflammatory arthritis.  Inflammatory markers are mildly elevated, but we discussed that this is multifactorial in light of the nonhealing wounds.  Will continue to monitor closely and trend including CBC, CMP, ESR, and CRP over time.    Continue follow-up with ophthalmology as directed for management of glaucoma and other ophthalmology complaints.    In terms of her bilateral CMC arthritis, I offered referral to hand surgery for consideration for corticosteroid injection since she responded positively in the past, but not interested at this time.  She will utilize topical Voltaren gel up to 4 times daily as needed.    Follow-up in 8 months    Plan:  Diagnoses and all orders for this visit:    Temporal arteritis syndrome (HCC)  -     CBC and differential  -     C-reactive protein  -     Comprehensive metabolic panel  -     Sedimentation rate, automated    Amaurosis fugax of left eye    Elevated sed rate (elev SR)  -     C-reactive protein  -     Sedimentation rate, automated    Elevated C-reactive  protein (CRP)  -     C-reactive protein  -     Sedimentation rate, automated    Arthritis of carpometacarpal (CMC) joint of both thumbs    Other orders  -     fluticasone (FLONASE) 50 mcg/act nasal spray; 1 spray in each nostril        I have personally reviewed prior notes, recent laboratory results, and pertinent films in PACS.     Activities as tolerated.   Exercise: try to maintain a low impact exercise regimen as much as possible. Walk for 30 minutes a day for at least 3 days a week.   Continue other medications as prescribed by PCP and other specialists.       RTC in 8 months    Rheumatic Disease Summary:  1.  Biopsy-proven temporal arteritis and amaurosis fugax of left eye  -Initial visit 4/28/2023: Continue pred taper, tapering down by 10 mg every 2 weeks.  Quick taper due to side effects including edema and oral thrush.  -Not interested in MTX or Actemra, started on HCQ  -Visit 8/14/2023: Asymptomatic.  Inflammation markers wnl.  Decrease pred from 5 mg QD to 2.5 QD for remainder of the month, then stop.  Decrease HCQ to 200 QD due to patient preference.  -Visit 4/15/2024: Asymptomatic in terms of GCA and PMR.  Monitor closely  2.  History of PMR, March 2022  -Presented with stiffness in the shoulder girdle and hip girdle.  CRP 9.8, sed rate 19.  Resolved with a steroid course.  3.  Positive rheumatoid factor (32 IU/mL)  -Resulted on labs from 12/2021.  -No sx of RA.  4.  RLE squamous cell carcinoma  -Mohs 10/2023, but with for residual biopsy-proven squamous cell carcinomas, 2 in the left pretibial and 1 in the right proximal central pretibial and 1 in the right distal medial pretibial region  5.  Other comorbidities: Hypertension, hypothyroidism, hyperlipidemia, glaucoma       HPI  Ms. Rodriguez is an 84-year-old female who presents for rheumatology follow-up of temporal artery biopsy-proven GCA and amaurosis fugax of left eye.    Notes blurriness of the left eye over time and states that this is not new  and ophthalmology is aware.  She states that she was told this is due to her glaucoma and she is being treated with drops.  She states she may have occasional headaches across the front of her head but nothing on the top or on the sides, or in the temporal area.  No scalp tenderness, temporal tenderness, jaw claudication, eye pain, eye redness.    Occasional pain in the right CMC and recalls having a steroid injection in her left CMC years ago which provided much relief.  Sometimes has pain in the knees.    The following portions of the patient's history were reviewed and updated as appropriate: allergies, current medications, past family history, past medical history, past social history, past surgical history and problem list.      Review of Systems  Constitutional: Negative for weight change, fevers, chills, night sweats, fatigue.  ENT/Mouth: Negative for hearing changes, ear pain, nasal congestion, sinus pain, hoarseness, sore throat, rhinorrhea, swallowing difficulty.   Eyes: Negative for pain, redness, discharge, vision changes.   Cardiovascular: Negative for chest pain, SOB, palpitations.   Respiratory: Negative for cough, sputum, wheezing, dyspnea.   Gastrointestinal: Negative for nausea, vomiting, diarrhea, constipation, pain, heartburn.  Genitourinary: Negative for dysuria, urinary frequency, hematuria.   Musculoskeletal: As per HPI.  Skin: Negative for skin rash, color changes.   Neuro: Negative for weakness, numbness, tingling, loss of consciousness.   Psych: Negative for anxiety, depression.   Heme/Lymph: Negative for easy bruising, bleeding, lymphadenopathy.        Past Medical History:   Diagnosis Date    Depression 12/30/2021    Lumbar stenosis     Transient global amnesia        Past Surgical History:   Procedure Laterality Date    CATARACT EXTRACTION      MOHS SURGERY Right 10/25/2023    R Leg    NC LIGATION/BIOPSY TEMPORAL ARTERY Left 04/14/2023    Procedure: BIOPSY ARTERY TEMPORAL;  Surgeon:  Carlos Lovelace MD;  Location: [facility];  Service: General       Social History     Socioeconomic History    Marital status: /Civil Union     Spouse name: Not on file    Number of children: 3    Years of education: Not on file    Highest education level: Not on file   Occupational History    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   Tobacco Use    Smoking status: Former    Smokeless tobacco: Never   Vaping Use    Vaping status: Never Used   Substance and Sexual Activity    Alcohol use: Yes     Comment: glass white wine with dinner    Drug use: No    Sexual activity: Not Currently     Partners: Male   Other Topics Concern    Not on file   Social History Narrative    Not on file     Social Determinants of Health     Financial Resource Strain: Not on file   Food Insecurity: Not on file   Transportation Needs: Not on file   Physical Activity: Not on file   Stress: Not on file   Social Connections: Not on file   Intimate Partner Violence: Not on file   Housing Stability: Not on file       Family History   Problem Relation Age of Onset    Stroke Mother 60        CVA    Pancreatic cancer Father     Hyperlipidemia Sister        Allergies   Allergen Reactions    Sulfa Antibiotics Hives    Latex Rash         Current Outpatient Medications:     fexofenadine (ALLEGRA) 180 MG tablet, Take 180 mg by mouth daily, Disp: , Rfl:     fluticasone (FLONASE) 50 mcg/act nasal spray, 1 spray in each nostril, Disp: , Rfl:     gabapentin (NEURONTIN) 100 mg capsule, Take 1 capsule (100 mg total) by mouth 2 (two) times a day, Disp: 180 capsule, Rfl: 1    levothyroxine 75 mcg tablet, Take 1 tablet (75 mcg total) by mouth daily, Disp: 90 tablet, Rfl: 3    lisinopril (ZESTRIL) 5 mg tablet, Take 1 tablet (5 mg total) by mouth daily, Disp: 90 tablet, Rfl: 3    Lumigan 0.01 % ophthalmic drops, , Disp: , Rfl:     traZODone (DESYREL) 50 mg tablet, TAKE 1 TABLET BY MOUTH DAILY AT  BEDTIME, Disp: 90 tablet, Rfl: 3     "brimonidine tartrate 0.2 % ophthalmic solution, , Disp: , Rfl:     clobetasol (TEMOVATE) 0.05 % cream, Apply topically 2 (two) times a day (Patient not taking: Reported on 1/30/2024), Disp: 45 g, Rfl: 3    mupirocin (BACTROBAN) 2 % ointment, APPLY TOPICALLY TO EACH DRESSING CHANGE (Patient not taking: Reported on 4/15/2024), Disp: , Rfl:     omeprazole (PriLOSEC) 40 MG capsule, TAKE 1 CAPSULE BY MOUTH DAILY (Patient not taking: Reported on 1/30/2024), Disp: 90 capsule, Rfl: 3    sodium hypochlorite (DAKIN'S HALF-STRENGTH) external solution, Apply 1 Application topically daily Soak gauze and pack into wounds daily. (Patient not taking: Reported on 1/30/2024), Disp: 473 mL, Rfl: 0    Current Facility-Administered Medications:     lidocaine (LMX) 4 % cream, , Topical, Once, Twyla Ga PA-C      Objective:    Vitals:    04/15/24 1300   BP: 128/80   Pulse: 76   Temp: 98.1 °F (36.7 °C)   SpO2: 97%   Weight: 54.9 kg (121 lb)   Height: 5' 2\" (1.575 m)       Physical Exam  General: Well appearing, well nourished, in no acute distress. Oriented x 3, normal mood and affect.  Ambulating without difficulty.  Hair: Normal texture and distribution.  Nails: Normal color, no deformities.  HEENT:  Head: Normocephalic, atraumatic.  Eyes: Conjunctiva clear, sclera non-icteric, EOM intact.  Nose: No external lesions, mucosa non-inflamed.  Mouth: Mucous membranes moist, no mucosal lesions.  Neck: Supple  Musculoskeletal:   + Mild tenderness of the right CMC  No tenderness to palpation or swelling of joints bilaterally to include: shoulder, elbow, wrist, MCP I-V, PIP I-V, knee, ankle, MTP I-V.  Neurologic: Alert and oriented. No focal neurological deficits appreciated.   Psychiatric: Normal mood and affect.       Scott Rojas PA-C  Rheumatology  "

## 2024-04-22 ENCOUNTER — TELEPHONE (OUTPATIENT)
Dept: INTERNAL MEDICINE CLINIC | Facility: CLINIC | Age: 84
End: 2024-04-22

## 2024-04-22 DIAGNOSIS — R19.7 DIARRHEA, UNSPECIFIED TYPE: Primary | ICD-10-CM

## 2024-04-22 RX ORDER — DIPHENOXYLATE HYDROCHLORIDE AND ATROPINE SULFATE 2.5; .025 MG/1; MG/1
1 TABLET ORAL 4 TIMES DAILY PRN
Qty: 30 TABLET | Refills: 0 | Status: SHIPPED | OUTPATIENT
Start: 2024-04-22

## 2024-04-22 NOTE — TELEPHONE ENCOUNTER
Patient is asking for a refill on   diphenoxylate-atropine 2.5mg. I do not see on active medication list, please advise.

## 2024-06-03 ENCOUNTER — OFFICE VISIT (OUTPATIENT)
Dept: INTERNAL MEDICINE CLINIC | Facility: CLINIC | Age: 84
End: 2024-06-03
Payer: MEDICARE

## 2024-06-03 VITALS
HEART RATE: 74 BPM | WEIGHT: 125 LBS | RESPIRATION RATE: 17 BRPM | DIASTOLIC BLOOD PRESSURE: 70 MMHG | OXYGEN SATURATION: 98 % | BODY MASS INDEX: 23 KG/M2 | HEIGHT: 62 IN | SYSTOLIC BLOOD PRESSURE: 116 MMHG

## 2024-06-03 DIAGNOSIS — M05.80 POLYARTHRITIS WITH POSITIVE RHEUMATOID FACTOR (HCC): ICD-10-CM

## 2024-06-03 DIAGNOSIS — M79.642 BILATERAL HAND PAIN: ICD-10-CM

## 2024-06-03 DIAGNOSIS — E78.2 MIXED HYPERLIPIDEMIA: ICD-10-CM

## 2024-06-03 DIAGNOSIS — E55.9 VITAMIN D DEFICIENCY: ICD-10-CM

## 2024-06-03 DIAGNOSIS — C44.92 SQUAMOUS CELL SKIN CANCER: Primary | ICD-10-CM

## 2024-06-03 DIAGNOSIS — E03.9 ACQUIRED HYPOTHYROIDISM: ICD-10-CM

## 2024-06-03 DIAGNOSIS — M79.641 BILATERAL HAND PAIN: ICD-10-CM

## 2024-06-03 PROCEDURE — G2211 COMPLEX E/M VISIT ADD ON: HCPCS | Performed by: INTERNAL MEDICINE

## 2024-06-03 PROCEDURE — 99214 OFFICE O/P EST MOD 30 MIN: CPT | Performed by: INTERNAL MEDICINE

## 2024-06-03 NOTE — PROGRESS NOTES
Ambulatory Visit  Name: Johana Rodriguez      : 1940      MRN: [id number]  Encounter Provider: Berry Espinal MD  Encounter Date: 6/3/2024   Encounter department: [facility] INTERNAL MEDICINE [place]    Assessment & Plan   1. Squamous cell skin cancer  Assessment & Plan:  Continue follow-up with oncology.  2. Polyarthritis with positive rheumatoid factor (HCC)  Assessment & Plan:  Continue follow-up with rheumatology.  Agree with orthopedics referral because of the ongoing hand pain.  Orders:  -     Ambulatory referral to Orthopedic Surgery; Future  3. Acquired hypothyroidism  Assessment & Plan:  Has been doing well but due for labs.  Continue levothyroxine 75 mcg daily.  Orders:  -     T4, free; Future  -     TSH, 3rd generation; Future  4. Mixed hyperlipidemia  Assessment & Plan:  Has been doing better.  But due for labs.  Orders:  -     Lipid panel; Future  -     Comprehensive metabolic panel; Future  5. Vitamin D deficiency  Assessment & Plan:  Controlled but again, due for labs.    Orders:  -     Vitamin D 25 hydroxy; Future  6. Bilateral hand pain  -     Ambulatory referral to Orthopedic Surgery; Future       History of Present Illness     Patient comes in today for routine follow-up.  She states she is doing somewhat better.  Her radiation treatment is finished for her squamous cell and her legs are healing.  Her rheumatoid arthritis is bothering her little bit.  Her specialist had suggested orthopedics but she was not sure.  [place] with her 's medical problems.  Her thyroid and cholesterol levels have been controlled but she is due for blood work.        Review of Systems   Respiratory:  Negative for shortness of breath.    Cardiovascular:  Negative for chest pain.   Gastrointestinal:  Negative for abdominal pain.     Current Outpatient Medications on File Prior to Visit   Medication Sig Dispense Refill  "  • diphenoxylate-atropine (LOMOTIL) 2.5-0.025 mg per tablet Take 1 tablet by mouth 4 (four) times a day as needed for diarrhea 30 tablet 0   • fexofenadine (ALLEGRA) 180 MG tablet Take 180 mg by mouth daily     • fluticasone (FLONASE) 50 mcg/act nasal spray 1 spray in each nostril     • gabapentin (NEURONTIN) 100 mg capsule Take 1 capsule (100 mg total) by mouth 2 (two) times a day 180 capsule 1   • levothyroxine 75 mcg tablet Take 1 tablet (75 mcg total) by mouth daily 90 tablet 3   • lisinopril (ZESTRIL) 5 mg tablet Take 1 tablet (5 mg total) by mouth daily 90 tablet 3   • Lumigan 0.01 % ophthalmic drops      • mupirocin (BACTROBAN) 2 % ointment      • omeprazole (PriLOSEC) 40 MG capsule TAKE 1 CAPSULE BY MOUTH DAILY 90 capsule 3   • traZODone (DESYREL) 50 mg tablet TAKE 1 TABLET BY MOUTH DAILY AT  BEDTIME 90 tablet 3   • [DISCONTINUED] brimonidine tartrate 0.2 % ophthalmic solution  (Patient not taking: Reported on 4/15/2024)     • [DISCONTINUED] clobetasol (TEMOVATE) 0.05 % cream Apply topically 2 (two) times a day (Patient not taking: Reported on 1/30/2024) 45 g 3   • [DISCONTINUED] sodium hypochlorite (DAKIN'S HALF-STRENGTH) external solution Apply 1 Application topically daily Soak gauze and pack into wounds daily. (Patient not taking: Reported on 6/3/2024) 473 mL 0     Current Facility-Administered Medications on File Prior to Visit   Medication Dose Route Frequency Provider Last Rate Last Admin   • lidocaine (LMX) 4 % cream   Topical Once Twyla Ga PA-C          Objective     /70 (BP Location: Left arm, Patient Position: Sitting, Cuff Size: Adult)   Pulse 74   Resp 17   Ht 5' 2\" (1.575 m)   Wt 56.7 kg (125 lb)   SpO2 98%   BMI 22.86 kg/m²     Physical Exam  Vitals and nursing note reviewed.   Constitutional:       Appearance: Normal appearance. She is well-developed.   Cardiovascular:      Rate and Rhythm: Normal rate and regular rhythm.      Heart sounds: Normal heart sounds.   Pulmonary: "      Effort: Pulmonary effort is normal.      Breath sounds: Normal breath sounds.   Abdominal:      Palpations: Abdomen is soft.      Tenderness: There is no abdominal tenderness.   Neurological:      Mental Status: She is alert and oriented to person, place, and time.       Administrative Statements

## 2024-06-03 NOTE — ASSESSMENT & PLAN NOTE
Continue follow-up with rheumatology.  Agree with orthopedics referral because of the ongoing hand pain.

## 2024-06-04 ENCOUNTER — TELEPHONE (OUTPATIENT)
Age: 84
End: 2024-06-04

## 2024-06-04 NOTE — TELEPHONE ENCOUNTER
Patient is being referred to a orthopedics. Please schedule accordingly.    [facility]   [phone]

## 2024-06-21 ENCOUNTER — APPOINTMENT (OUTPATIENT)
Dept: LAB | Facility: HOSPITAL | Age: 84
End: 2024-06-21
Payer: MEDICARE

## 2024-06-21 DIAGNOSIS — E03.9 ACQUIRED HYPOTHYROIDISM: ICD-10-CM

## 2024-06-21 DIAGNOSIS — E55.9 VITAMIN D DEFICIENCY: ICD-10-CM

## 2024-06-21 DIAGNOSIS — E78.2 MIXED HYPERLIPIDEMIA: ICD-10-CM

## 2024-06-21 LAB
25(OH)D3 SERPL-MCNC: 38.8 NG/ML (ref 30–100)
ALBUMIN SERPL BCP-MCNC: 3.9 G/DL (ref 3.5–5)
ALP SERPL-CCNC: 83 U/L (ref 34–104)
ALT SERPL W P-5'-P-CCNC: 14 U/L (ref 7–52)
ANION GAP SERPL CALCULATED.3IONS-SCNC: 9 MMOL/L (ref 4–13)
AST SERPL W P-5'-P-CCNC: 17 U/L (ref 13–39)
BASOPHILS # BLD AUTO: 0.07 THOUSANDS/ÂΜL (ref 0–0.1)
BASOPHILS NFR BLD AUTO: 1 % (ref 0–1)
BILIRUB SERPL-MCNC: 0.58 MG/DL (ref 0.2–1)
BUN SERPL-MCNC: 14 MG/DL (ref 5–25)
CALCIUM SERPL-MCNC: 9.4 MG/DL (ref 8.4–10.2)
CHLORIDE SERPL-SCNC: 101 MMOL/L (ref 96–108)
CHOLEST SERPL-MCNC: 162 MG/DL
CO2 SERPL-SCNC: 28 MMOL/L (ref 21–32)
CREAT SERPL-MCNC: 0.81 MG/DL (ref 0.6–1.3)
CRP SERPL QL: 12.7 MG/L
EOSINOPHIL # BLD AUTO: 0.25 THOUSAND/ÂΜL (ref 0–0.61)
EOSINOPHIL NFR BLD AUTO: 4 % (ref 0–6)
ERYTHROCYTE [DISTWIDTH] IN BLOOD BY AUTOMATED COUNT: 12.4 % (ref 11.6–15.1)
ERYTHROCYTE [SEDIMENTATION RATE] IN BLOOD: 13 MM/HOUR (ref 0–29)
GFR SERPL CREATININE-BSD FRML MDRD: 67 ML/MIN/1.73SQ M
GLUCOSE P FAST SERPL-MCNC: 86 MG/DL (ref 65–99)
HCT VFR BLD AUTO: 42.5 % (ref 34.8–46.1)
HDLC SERPL-MCNC: 54 MG/DL
HGB BLD-MCNC: 13.9 G/DL (ref 11.5–15.4)
IMM GRANULOCYTES # BLD AUTO: 0.02 THOUSAND/UL (ref 0–0.2)
IMM GRANULOCYTES NFR BLD AUTO: 0 % (ref 0–2)
LDLC SERPL CALC-MCNC: 90 MG/DL (ref 0–100)
LYMPHOCYTES # BLD AUTO: 1.93 THOUSANDS/ÂΜL (ref 0.6–4.47)
LYMPHOCYTES NFR BLD AUTO: 28 % (ref 14–44)
MCH RBC QN AUTO: 30.3 PG (ref 26.8–34.3)
MCHC RBC AUTO-ENTMCNC: 32.7 G/DL (ref 31.4–37.4)
MCV RBC AUTO: 93 FL (ref 82–98)
MONOCYTES # BLD AUTO: 0.74 THOUSAND/ÂΜL (ref 0.17–1.22)
MONOCYTES NFR BLD AUTO: 11 % (ref 4–12)
NEUTROPHILS # BLD AUTO: 3.96 THOUSANDS/ÂΜL (ref 1.85–7.62)
NEUTS SEG NFR BLD AUTO: 56 % (ref 43–75)
NONHDLC SERPL-MCNC: 108 MG/DL
NRBC BLD AUTO-RTO: 0 /100 WBCS
PLATELET # BLD AUTO: 256 THOUSANDS/UL (ref 149–390)
PMV BLD AUTO: 9.3 FL (ref 8.9–12.7)
POTASSIUM SERPL-SCNC: 4.2 MMOL/L (ref 3.5–5.3)
PROT SERPL-MCNC: 7.1 G/DL (ref 6.4–8.4)
RBC # BLD AUTO: 4.58 MILLION/UL (ref 3.81–5.12)
SODIUM SERPL-SCNC: 138 MMOL/L (ref 135–147)
T4 FREE SERPL-MCNC: 1.04 NG/DL (ref 0.61–1.12)
TRIGL SERPL-MCNC: 88 MG/DL
TSH SERPL DL<=0.05 MIU/L-ACNC: 0.39 UIU/ML (ref 0.45–4.5)
WBC # BLD AUTO: 6.97 THOUSAND/UL (ref 4.31–10.16)

## 2024-06-21 PROCEDURE — 84439 ASSAY OF FREE THYROXINE: CPT

## 2024-06-21 PROCEDURE — 84443 ASSAY THYROID STIM HORMONE: CPT

## 2024-06-21 PROCEDURE — 36415 COLL VENOUS BLD VENIPUNCTURE: CPT

## 2024-06-21 PROCEDURE — 80061 LIPID PANEL: CPT

## 2024-06-21 PROCEDURE — 82306 VITAMIN D 25 HYDROXY: CPT

## 2024-06-24 ENCOUNTER — OFFICE VISIT (OUTPATIENT)
Dept: OBGYN CLINIC | Facility: CLINIC | Age: 84
End: 2024-06-24
Payer: MEDICARE

## 2024-06-24 VITALS
DIASTOLIC BLOOD PRESSURE: 77 MMHG | BODY MASS INDEX: 21.68 KG/M2 | SYSTOLIC BLOOD PRESSURE: 155 MMHG | HEIGHT: 62 IN | WEIGHT: 117.8 LBS | HEART RATE: 78 BPM

## 2024-06-24 DIAGNOSIS — G56.03 CARPAL TUNNEL SYNDROME ON BOTH SIDES: Primary | ICD-10-CM

## 2024-06-24 DIAGNOSIS — E03.9 ACQUIRED HYPOTHYROIDISM: Primary | ICD-10-CM

## 2024-06-24 DIAGNOSIS — M19.041 ARTHRITIS OF BOTH HANDS: ICD-10-CM

## 2024-06-24 DIAGNOSIS — M19.042 ARTHRITIS OF BOTH HANDS: ICD-10-CM

## 2024-06-24 PROCEDURE — 99204 OFFICE O/P NEW MOD 45 MIN: CPT | Performed by: FAMILY MEDICINE

## 2024-06-24 NOTE — PROGRESS NOTES
Assessment/Plan:  Assessment & Plan   Diagnoses and all orders for this visit:    Carpal tunnel syndrome on both sides  -     Ambulatory referral to Orthopedic Surgery  -     Cock Up Wrist Splint  -     Ambulatory Referral to PT/OT Hand Therapy; Future    Arthritis of both hands  -     Diclofenac Sodium (VOLTAREN) 1 %; Apply 2 g topically 3 (three) times a day      84-year-old right-hand-dominant female with pain and numbness both hands more than few months duration.  Discussed the patient physical exam, impression, and plan.  Physical exam both has noted for joint hypertrophy changes at the first CMC and first MCP joints.  She has mild tenderness on the right at the first MCP joint.  She has intact range of motion and strength of the wrist and digits of the hand.  Tinel's testing unremarkable.  Carpal tunnel compression is unremarkable.  There is positive Phalen's bilaterally.  Clinical impression is that she may have symptoms of combination of arthritis and carpal tunnel syndrome.  I discussed treatment regimen of splinting, topical anti-inflammatory, supplements, and formal therapy.  Surgery not warranted at this time.  She is apprehensive with steroid injection at this time.  I provided cock up wrist splints for her to wear at nighttime.  She is to apply topical diclofenac gel 3 times daily as needed.  She is to take turmeric, tart cherry, and glucosamine supplements.  She is to start formal therapy as soon as possible and do home exercises as directed.  She will follow-up as needed.          Subjective:   Patient ID: Johana Rodriguez is a 84 y.o. female.  Chief Complaint   Patient presents with    Left Hand - Pain, Numbness    Right Hand - Pain, Numbness        84-year-old right-hand-dominant female presents for evaluation pain and numbness both hands more than few months duration.  She denies trauma or inciting event.  Pain described as generalized to the hands, achy and sore and cramping, associated with  "numbness and tingling, worse with gripping activities such as driving, bothersome at nighttime, and improved with resting or changing position.  Symptoms sometimes wake her from night.  She manages symptoms by massaging the hands or thumping her hands together.  She was seen by primary care physician and referred to orthopedic care.    Hand Pain  This is a new problem. The current episode started more than 1 month ago. The problem occurs daily. The problem has been gradually worsening. Associated symptoms include arthralgias and numbness. Pertinent negatives include no joint swelling or weakness. Exacerbated by: Gripping. She has tried rest and position changes for the symptoms. The treatment provided mild relief.           The following portions of the patient's history were reviewed and updated as appropriate: She  has a past medical history of Depression (12/30/2021), Lumbar stenosis, and Transient global amnesia.  She is allergic to sulfa antibiotics and latex..    Review of Systems   Musculoskeletal:  Positive for arthralgias. Negative for joint swelling.   Neurological:  Positive for numbness. Negative for weakness.       Objective:  Vitals:    06/24/24 1411   BP: 155/77   Pulse: 78   Weight: 53.4 kg (117 lb 12.8 oz)   Height: 5' 2\" (1.575 m)      Right Hand Exam     Tenderness   Right hand tenderness location: First MCP.    Range of Motion   The patient has normal right wrist ROM.     Muscle Strength   The patient has normal right wrist strength.    Tests   Phalen’s sign: positive  Tinel's sign (median nerve): negative  Finkelstein's test: negative    Other   Sensation: normal  Pulse: present      Left Hand Exam     Range of Motion   The patient has normal left wrist ROM.    Muscle Strength   The patient has normal left wrist strength.    Tests   Phalen’s sign: positive  Tinel's sign (median nerve): negative  Finkelstein's test: negative    Other   Sensation: normal  Pulse: present      Right Elbow Exam "     Tests   Tinel's sign (cubital tunnel): negative      Left Elbow Exam     Tests   Tinel's sign (cubital tunnel): negative          Tenderness     Left Wrist/Hand   No tenderness in the first dorsal compartment, second dorsal compartment, fifth dorsal compartment, sixth dorsal compartment, scaphoid and lunate.     Right Wrist/Hand   No tenderness in the first dorsal compartment, second dorsal compartment, fifth dorsal compartment, sixth dorsal compartment, scaphoid and lunate.     Strength/Myotome Testing     Left Wrist/Hand   Normal wrist strength    Right Wrist/Hand   Normal wrist strength    Tests     Left Wrist/Hand   Positive Phalen's sign.   Negative Finkelstein's, Tinel's sign (medial nerve) and Tinel's sign (radial tunnel).     Right Wrist/Hand   Positive Phalen's sign.   Negative Finkelstein's, Tinel's sign (medial nerve) and Tinel's sign (radial tunnel).       Physical Exam  Vitals and nursing note reviewed.   Constitutional:       Appearance: Normal appearance. She is well-developed. She is not ill-appearing or diaphoretic.   HENT:      Head: Normocephalic and atraumatic.      Right Ear: External ear normal.      Left Ear: External ear normal.   Eyes:      Conjunctiva/sclera: Conjunctivae normal.   Neck:      Trachea: No tracheal deviation.   Cardiovascular:      Rate and Rhythm: Normal rate.   Pulmonary:      Effort: Pulmonary effort is normal. No respiratory distress.   Abdominal:      General: There is no distension.   Musculoskeletal:         General: Swelling and tenderness present.   Skin:     General: Skin is warm and dry.      Coloration: Skin is not jaundiced or pale.   Neurological:      Mental Status: She is alert and oriented to person, place, and time.   Psychiatric:         Mood and Affect: Mood normal.         Behavior: Behavior normal.         Thought Content: Thought content normal.         Judgment: Judgment normal.         More than 41 minutes were spent with reviewing patient chart,  obtaining history from patient, examining patient, discussing and implementing treatment plan.

## 2024-06-24 NOTE — LETTER
June 24, 2024     Berry Espinal MD  [address]  [facility] [address]    Patient: Johana Rodriguez   YOB: 1940   Date of Visit: 6/24/2024       Dear Dr. Espinal:    Thank you for referring Johana Rodriguez to me for evaluation. Below are my notes for this consultation.    If you have questions, please do not hesitate to call me. I look forward to following your patient along with you.         Sincerely,        Julia Jiménez DO        CC: No Recipients    Julia Jiménez DO  6/24/2024  4:42 PM  Sign when Signing Visit  Assessment/Plan:  Assessment & Plan  Diagnoses and all orders for this visit:    Carpal tunnel syndrome on both sides  -     Ambulatory referral to Orthopedic Surgery  -     Cock Up Wrist Splint  -     Ambulatory Referral to PT/OT Hand Therapy; Future    Arthritis of both hands  -     Diclofenac Sodium (VOLTAREN) 1 %; Apply 2 g topically 3 (three) times a day      84-year-old right-hand-dominant female with pain and numbness both hands more than few months duration.  Discussed the patient physical exam, impression, and plan.  Physical exam both has noted for joint hypertrophy changes at the first CMC and first MCP joints.  She has mild tenderness on the right at the first MCP joint.  She has intact range of motion and strength of the wrist and digits of the hand.  Tinel's testing unremarkable.  Carpal tunnel compression is unremarkable.  There is positive Phalen's bilaterally.  Clinical impression is that she may have symptoms of combination of arthritis and carpal tunnel syndrome.  I discussed treatment regimen of splinting, topical anti-inflammatory, supplements, and formal therapy.  Surgery not warranted at this time.  She is apprehensive with steroid injection at this time.  I provided cock up wrist splints for her to wear at nighttime.  She is to apply topical diclofenac gel 3 times daily as needed.  She is to take turmeric, tart cherry, and glucosamine  "supplements.  She is to start formal therapy as soon as possible and do home exercises as directed.  She will follow-up as needed.          Subjective:   Patient ID: Johana Rodriguez is a 84 y.o. female.  Chief Complaint   Patient presents with   • Left Hand - Pain, Numbness   • Right Hand - Pain, Numbness        84-year-old right-hand-dominant female presents for evaluation pain and numbness both hands more than few months duration.  She denies trauma or inciting event.  Pain described as generalized to the hands, achy and sore and cramping, associated with numbness and tingling, worse with gripping activities such as driving, bothersome at nighttime, and improved with resting or changing position.  Symptoms sometimes wake her from night.  She manages symptoms by massaging the hands or thumping her hands together.  She was seen by primary care physician and referred to orthopedic care.    Hand Pain  This is a new problem. The current episode started more than 1 month ago. The problem occurs daily. The problem has been gradually worsening. Associated symptoms include arthralgias and numbness. Pertinent negatives include no joint swelling or weakness. Exacerbated by: Gripping. She has tried rest and position changes for the symptoms. The treatment provided mild relief.           The following portions of the patient's history were reviewed and updated as appropriate: She  has a past medical history of Depression (12/30/2021), Lumbar stenosis, and Transient global amnesia.  She is allergic to sulfa antibiotics and latex..    Review of Systems   Musculoskeletal:  Positive for arthralgias. Negative for joint swelling.   Neurological:  Positive for numbness. Negative for weakness.       Objective:  Vitals:    06/24/24 1411   BP: 155/77   Pulse: 78   Weight: 53.4 kg (117 lb 12.8 oz)   Height: 5' 2\" (1.575 m)      Right Hand Exam     Tenderness   Right hand tenderness location: First MCP.    Range of Motion   The patient " has normal right wrist ROM.     Muscle Strength   The patient has normal right wrist strength.    Tests   Phalen’s sign: positive  Tinel's sign (median nerve): negative  Finkelstein's test: negative    Other   Sensation: normal  Pulse: present      Left Hand Exam     Range of Motion   The patient has normal left wrist ROM.    Muscle Strength   The patient has normal left wrist strength.    Tests   Phalen’s sign: positive  Tinel's sign (median nerve): negative  Finkelstein's test: negative    Other   Sensation: normal  Pulse: present      Right Elbow Exam     Tests   Tinel's sign (cubital tunnel): negative      Left Elbow Exam     Tests   Tinel's sign (cubital tunnel): negative          Tenderness     Left Wrist/Hand   No tenderness in the first dorsal compartment, second dorsal compartment, fifth dorsal compartment, sixth dorsal compartment, scaphoid and lunate.     Right Wrist/Hand   No tenderness in the first dorsal compartment, second dorsal compartment, fifth dorsal compartment, sixth dorsal compartment, scaphoid and lunate.     Strength/Myotome Testing     Left Wrist/Hand   Normal wrist strength    Right Wrist/Hand   Normal wrist strength    Tests     Left Wrist/Hand   Positive Phalen's sign.   Negative Finkelstein's, Tinel's sign (medial nerve) and Tinel's sign (radial tunnel).     Right Wrist/Hand   Positive Phalen's sign.   Negative Finkelstein's, Tinel's sign (medial nerve) and Tinel's sign (radial tunnel).       Physical Exam  Vitals and nursing note reviewed.   Constitutional:       Appearance: Normal appearance. She is well-developed. She is not ill-appearing or diaphoretic.   HENT:      Head: Normocephalic and atraumatic.      Right Ear: External ear normal.      Left Ear: External ear normal.   Eyes:      Conjunctiva/sclera: Conjunctivae normal.   Neck:      Trachea: No tracheal deviation.   Cardiovascular:      Rate and Rhythm: Normal rate.   Pulmonary:      Effort: Pulmonary effort is normal. No  respiratory distress.   Abdominal:      General: There is no distension.   Musculoskeletal:         General: Swelling and tenderness present.   Skin:     General: Skin is warm and dry.      Coloration: Skin is not jaundiced or pale.   Neurological:      Mental Status: She is alert and oriented to person, place, and time.   Psychiatric:         Mood and Affect: Mood normal.         Behavior: Behavior normal.         Thought Content: Thought content normal.         Judgment: Judgment normal.         More than 41 minutes were spent with reviewing patient chart, obtaining history from patient, examining patient, discussing and implementing treatment plan.

## 2024-06-24 NOTE — LETTER
June 24, 2024     Berry Espinal MD  [address]  [facility] [address]    Patient: Johana Rodriguez   YOB: 1940   Date of Visit: 6/24/2024       Dear Dr. Espinal:    Thank you for referring Johana Rodriguez to me for evaluation. Below are my notes for this consultation.    If you have questions, please do not hesitate to call me. I look forward to following your patient along with you.         Sincerely,        Julia Jiménez DO        CC: No Recipients    Julia Jiménez DO  6/24/2024  4:41 PM  Sign when Signing Visit  Assessment/Plan:  Assessment & Plan  Diagnoses and all orders for this visit:    Carpal tunnel syndrome on both sides  -     Ambulatory referral to Orthopedic Surgery  -     Cock Up Wrist Splint  -     Ambulatory Referral to PT/OT Hand Therapy; Future    Arthritis of both hands  -     Diclofenac Sodium (VOLTAREN) 1 %; Apply 2 g topically 3 (three) times a day      84-year-old right-hand-dominant female with pain and numbness both hands more than few months duration.  Discussed the patient physical exam, impression, and plan.  Physical exam both has noted for joint hypertrophy changes at the first CMC and first MCP joints.  She has mild tenderness on the right at the first MCP joint.  She has intact range of motion and strength of the wrist and digits of the hand.  Tinel's testing unremarkable.  Carpal tunnel compression is unremarkable.  There is positive Phalen's bilaterally.  Clinical impression is that she may have symptoms of combination of arthritis and carpal tunnel syndrome.  I discussed treatment regimen of splinting, topical anti-inflammatory, supplements, and formal therapy.  Surgery not warranted at this time.  She is apprehensive with steroid injection at this time.  I provided cock up wrist splints for her to wear at nighttime.  She is to apply topical diclofenac gel 3 times daily as needed.  She is to take turmeric, tart cherry, and glucosamine  "supplements.  She is to start formal therapy as soon as possible and do home exercises as directed.  She will follow-up as needed.          Subjective:   Patient ID: Johana Rodriguez is a 84 y.o. female.  Chief Complaint   Patient presents with    Left Wrist - Numbness    Right Wrist - Numbness    Left Hand - Pain     Pins and needles    Right Hand - Pain        84-year-old right-hand-dominant female presents for evaluation pain and numbness both hands more than few months duration.  She denies trauma or inciting event.  Pain described as generalized to the hands, achy and sore and cramping, associated with numbness and tingling, worse with gripping activities such as driving, bothersome at nighttime, and improved with resting or changing position.  Symptoms sometimes wake her from night.  She manages symptoms by massaging the hands or thumping her hands together.  She was seen by primary care physician and referred to orthopedic care.    Hand Pain  This is a new problem. The current episode started more than 1 month ago. The problem occurs daily. The problem has been gradually worsening. Associated symptoms include arthralgias and numbness. Pertinent negatives include no joint swelling or weakness. Exacerbated by: Gripping. She has tried rest and position changes for the symptoms. The treatment provided mild relief.           The following portions of the patient's history were reviewed and updated as appropriate: She  has a past medical history of Depression (12/30/2021), Lumbar stenosis, and Transient global amnesia.  She is allergic to sulfa antibiotics and latex..    Review of Systems   Musculoskeletal:  Positive for arthralgias. Negative for joint swelling.   Neurological:  Positive for numbness. Negative for weakness.       Objective:  Vitals:    06/24/24 1411   BP: 155/77   Pulse: 78   Weight: 53.4 kg (117 lb 12.8 oz)   Height: 5' 2\" (1.575 m)      Right Hand Exam     Tenderness   Right hand tenderness " location: First MCP.    Range of Motion   The patient has normal right wrist ROM.     Muscle Strength   The patient has normal right wrist strength.    Tests   Phalen’s sign: positive  Tinel's sign (median nerve): negative  Finkelstein's test: negative    Other   Sensation: normal  Pulse: present      Left Hand Exam     Range of Motion   The patient has normal left wrist ROM.    Muscle Strength   The patient has normal left wrist strength.    Tests   Phalen’s sign: positive  Tinel's sign (median nerve): negative  Finkelstein's test: negative    Other   Sensation: normal  Pulse: present      Right Elbow Exam     Tests   Tinel's sign (cubital tunnel): negative      Left Elbow Exam     Tests   Tinel's sign (cubital tunnel): negative          Tenderness     Left Wrist/Hand   No tenderness in the first dorsal compartment, second dorsal compartment, fifth dorsal compartment, sixth dorsal compartment, scaphoid and lunate.     Right Wrist/Hand   No tenderness in the first dorsal compartment, second dorsal compartment, fifth dorsal compartment, sixth dorsal compartment, scaphoid and lunate.     Strength/Myotome Testing     Left Wrist/Hand   Normal wrist strength    Right Wrist/Hand   Normal wrist strength    Tests     Left Wrist/Hand   Positive Phalen's sign.   Negative Finkelstein's, Tinel's sign (medial nerve) and Tinel's sign (radial tunnel).     Right Wrist/Hand   Positive Phalen's sign.   Negative Finkelstein's, Tinel's sign (medial nerve) and Tinel's sign (radial tunnel).       Physical Exam  Vitals and nursing note reviewed.   Constitutional:       Appearance: Normal appearance. She is well-developed. She is not ill-appearing or diaphoretic.   HENT:      Head: Normocephalic and atraumatic.      Right Ear: External ear normal.      Left Ear: External ear normal.   Eyes:      Conjunctiva/sclera: Conjunctivae normal.   Neck:      Trachea: No tracheal deviation.   Cardiovascular:      Rate and Rhythm: Normal rate.    Pulmonary:      Effort: Pulmonary effort is normal. No respiratory distress.   Abdominal:      General: There is no distension.   Musculoskeletal:         General: Swelling and tenderness present.   Skin:     General: Skin is warm and dry.      Coloration: Skin is not jaundiced or pale.   Neurological:      Mental Status: She is alert and oriented to person, place, and time.   Psychiatric:         Mood and Affect: Mood normal.         Behavior: Behavior normal.         Thought Content: Thought content normal.         Judgment: Judgment normal.         More than 41 minutes were spent with reviewing patient chart, obtaining history from patient, examining patient, discussing and implementing treatment plan.

## 2024-06-24 NOTE — PATIENT INSTRUCTIONS
Over the counter vitamins:    - Turmeric vitamin at least 1000 mg daily    - Tart cherry vitamin at least 1000 mg daily    - Glucosamine-chondrointin 2-3 times daily     Rx: Diclofenac gel/voltaren  - 3 times daily

## 2024-10-09 ENCOUNTER — OFFICE VISIT (OUTPATIENT)
Dept: INTERNAL MEDICINE CLINIC | Facility: CLINIC | Age: 84
End: 2024-10-09
Payer: MEDICARE

## 2024-10-09 ENCOUNTER — APPOINTMENT (OUTPATIENT)
Dept: LAB | Facility: HOSPITAL | Age: 84
End: 2024-10-09
Payer: MEDICARE

## 2024-10-09 VITALS
DIASTOLIC BLOOD PRESSURE: 80 MMHG | HEART RATE: 71 BPM | BODY MASS INDEX: 22.19 KG/M2 | SYSTOLIC BLOOD PRESSURE: 130 MMHG | OXYGEN SATURATION: 95 % | WEIGHT: 120.6 LBS | HEIGHT: 62 IN | RESPIRATION RATE: 16 BRPM

## 2024-10-09 DIAGNOSIS — E03.9 ACQUIRED HYPOTHYROIDISM: ICD-10-CM

## 2024-10-09 DIAGNOSIS — M05.80 POLYARTHRITIS WITH POSITIVE RHEUMATOID FACTOR (HCC): ICD-10-CM

## 2024-10-09 DIAGNOSIS — I10 HYPERTENSION, BENIGN: Primary | ICD-10-CM

## 2024-10-09 PROBLEM — F32.1 CURRENT MODERATE EPISODE OF MAJOR DEPRESSIVE DISORDER WITHOUT PRIOR EPISODE (HCC): Status: RESOLVED | Noted: 2022-02-02 | Resolved: 2024-10-09

## 2024-10-09 PROBLEM — F32.A DEPRESSION: Status: RESOLVED | Noted: 2021-12-30 | Resolved: 2024-10-09

## 2024-10-09 LAB
CRP SERPL QL: 10.3 MG/L
ERYTHROCYTE [SEDIMENTATION RATE] IN BLOOD: 16 MM/HOUR (ref 0–29)
T4 FREE SERPL-MCNC: 1.17 NG/DL (ref 0.61–1.12)
TSH SERPL DL<=0.05 MIU/L-ACNC: 0.36 UIU/ML (ref 0.45–4.5)

## 2024-10-09 PROCEDURE — G2211 COMPLEX E/M VISIT ADD ON: HCPCS | Performed by: INTERNAL MEDICINE

## 2024-10-09 PROCEDURE — 86140 C-REACTIVE PROTEIN: CPT

## 2024-10-09 PROCEDURE — 36415 COLL VENOUS BLD VENIPUNCTURE: CPT

## 2024-10-09 PROCEDURE — 99214 OFFICE O/P EST MOD 30 MIN: CPT | Performed by: INTERNAL MEDICINE

## 2024-10-09 PROCEDURE — 84439 ASSAY OF FREE THYROXINE: CPT

## 2024-10-09 PROCEDURE — 84443 ASSAY THYROID STIM HORMONE: CPT

## 2024-10-09 PROCEDURE — 85652 RBC SED RATE AUTOMATED: CPT

## 2024-10-09 RX ORDER — NETARSUDIL 0.2 MG/ML
SOLUTION/ DROPS OPHTHALMIC; TOPICAL
COMMUNITY
Start: 2024-07-26

## 2024-10-09 NOTE — PROGRESS NOTES
"Ambulatory Visit  Name: Johana Rodriguez      : 1940      MRN: [id number]  Encounter Provider: Berry Espinal MD  Encounter Date: 10/9/2024   Encounter department: [facility] INTERNAL MEDICINE [place]    Assessment & Plan  Hypertension, benign         Acquired hypothyroidism         Polyarthritis with positive rheumatoid factor (HCC)    Orders:    C-reactive protein; Future    Sedimentation rate, automated; Future       History of Present Illness     Patient comes in today for routine follow-up.  She states he continues to do better.  Her blood pressure is controlled.  She has finished her radiation treatment for the skin and did very well.  She is happy with the result.  Her autoimmune disorders appear to be stable.  Needs to repeat her thyroid levels.  She is taking her meds as directed.  Denies any new complaints today.  No further additions to her history.          Review of Systems   Respiratory:  Negative for shortness of breath.    Cardiovascular:  Negative for chest pain.   Gastrointestinal:  Negative for abdominal pain.           Objective     /80 (BP Location: Right arm, Patient Position: Sitting, Cuff Size: Adult)   Pulse 71   Resp 16   Ht 5' 2\" (1.575 m)   Wt 54.7 kg (120 lb 9.6 oz)   SpO2 95%   BMI 22.06 kg/m²     Physical Exam  Vitals and nursing note reviewed.   Constitutional:       Appearance: Normal appearance. She is well-developed.   Cardiovascular:      Rate and Rhythm: Normal rate and regular rhythm.      Heart sounds: Normal heart sounds.   Pulmonary:      Effort: Pulmonary effort is normal.      Breath sounds: Normal breath sounds.   Abdominal:      Palpations: Abdomen is soft.      Tenderness: There is no abdominal tenderness.   Neurological:      Mental Status: She is alert and oriented to person, place, and time.   Psychiatric:         Mood and Affect: Mood normal.         Behavior: Behavior normal.         "

## 2024-10-10 DIAGNOSIS — E03.9 HYPOTHYROIDISM, UNSPECIFIED TYPE: ICD-10-CM

## 2024-10-10 RX ORDER — LEVOTHYROXINE SODIUM 50 UG/1
50 TABLET ORAL DAILY
Qty: 90 TABLET | Refills: 1 | Status: SHIPPED | OUTPATIENT
Start: 2024-10-10 | End: 2024-10-14 | Stop reason: SDUPTHER

## 2024-10-14 ENCOUNTER — TELEPHONE (OUTPATIENT)
Age: 84
End: 2024-10-14

## 2024-10-14 DIAGNOSIS — E03.9 HYPOTHYROIDISM, UNSPECIFIED TYPE: ICD-10-CM

## 2024-10-14 RX ORDER — LEVOTHYROXINE SODIUM 50 UG/1
50 TABLET ORAL DAILY
Qty: 90 TABLET | Refills: 1 | Status: SHIPPED | OUTPATIENT
Start: 2024-10-14

## 2024-10-14 NOTE — TELEPHONE ENCOUNTER
Pt will start taking her new dose of thyroid med ( 50 mcg) but it needs to [facility] please,   she doesn't pay copay with them     Can we resend [facility]?    She will redo thyroid labs in 6 weeks after starting it

## 2024-10-26 DIAGNOSIS — I10 ESSENTIAL HYPERTENSION: ICD-10-CM

## 2024-10-27 RX ORDER — LISINOPRIL 5 MG/1
5 TABLET ORAL DAILY
Qty: 90 TABLET | Refills: 1 | Status: SHIPPED | OUTPATIENT
Start: 2024-10-27

## 2024-11-27 NOTE — ASSESSMENT & PLAN NOTE
Lab Results   Component Value Date    [id number] 0 811 09/26/2022    FREET4 1 01 09/26/2022       · Continue home levothyroxine [facility]    Reginaldo Hdz Patient Status:  Outpatient in a Bed   Age/Gender 87 year old male MRN [id number]   Location [facility] POST ANESTHESIA CARE UNIT Attending William Leon MD   Hosp Day # 0 PCP Olvin Dinh MD       Anesthesia Post-op Note    CYSTOSCOPY, TRANSURETHRAL RESECTION BLADDER TUMOR, REMOVAL OF BLADDER STONES,TRANSURETHRAL RESECTION PROSTATE    Procedure Summary       Date: 11/27/24 Room / Location:  MAIN OR 07 / [facility] MAIN OR    Anesthesia Start: 1347 Anesthesia Stop: 1528    Procedure: CYSTOSCOPY, TRANSURETHRAL RESECTION BLADDER TUMOR, REMOVAL OF BLADDER STONES,TRANSURETHRAL RESECTION PROSTATE Diagnosis:       Benign prostatic hyperplasia with lower urinary tract symptoms, symptom details unspecified      Bladder mass      Bladder stones      (Benign prostatic hyperplasia with lower urinary tract symptoms, symptom details unspecified [N40.1]Bladder mass [N32.89]Bladder stones [N21.0])    Surgeons: William Leno MD Anesthesiologist: Khoa Pacheco MD    Anesthesia Type: Not recorded ASA Status: Not recorded            Anesthesia Type: No value filed.    Vitals Value Taken Time   BP 98/51 11/27/24 1525   Temp 96.8 °F (36 °C) 11/27/24 1520   Pulse 69 11/27/24 1527   Resp 9 11/27/24 1527   SpO2 100 % 11/27/24 1527   Vitals shown include unfiled device data.    Patient Location: PACU    Anesthesia Type: general    Airway Patency: patent    Postop Pain Control: adequate    Mental Status: preanesthetic baseline    Nausea/Vomiting: none    Cardiopulmonary/Hydration status: stable euvolemic    Complications: no apparent anesthesia related complications    Postop vital signs: stable    Comments: Report to PACU JARRED Walsh.    Dental Exam: Unchanged from Preop    Patient to be discharged from PACU when criteria met.

## 2024-12-04 ENCOUNTER — APPOINTMENT (OUTPATIENT)
Dept: LAB | Facility: HOSPITAL | Age: 84
End: 2024-12-04
Attending: INTERNAL MEDICINE
Payer: MEDICARE

## 2024-12-04 DIAGNOSIS — E03.9 HYPOTHYROIDISM, UNSPECIFIED TYPE: ICD-10-CM

## 2024-12-04 LAB
T4 FREE SERPL-MCNC: 0.99 NG/DL (ref 0.61–1.12)
TSH SERPL DL<=0.05 MIU/L-ACNC: 11.54 UIU/ML (ref 0.45–4.5)

## 2024-12-04 PROCEDURE — 84443 ASSAY THYROID STIM HORMONE: CPT

## 2024-12-04 PROCEDURE — 36415 COLL VENOUS BLD VENIPUNCTURE: CPT

## 2024-12-04 PROCEDURE — 84439 ASSAY OF FREE THYROXINE: CPT

## 2024-12-05 ENCOUNTER — RESULTS FOLLOW-UP (OUTPATIENT)
Dept: INTERNAL MEDICINE CLINIC | Facility: CLINIC | Age: 84
End: 2024-12-05

## 2024-12-05 DIAGNOSIS — E03.9 ACQUIRED HYPOTHYROIDISM: Primary | ICD-10-CM

## 2024-12-05 NOTE — TELEPHONE ENCOUNTER
Pt called in asking about her abnormal thyroid level. She is inquiring if the dose she is on 50 mcg she should continue until she is seen in March. She also is asking she started eye drops for her glaucoma, Timolol Maleate 0.5% a week ago. She read that it can affect her thyroid and is wondering if this is good for her to continue to take or if she could be recommended on something else. Please review and advise patient.

## 2024-12-09 RX ORDER — TIMOLOL MALEATE 2.5 MG/ML
1 SOLUTION/ DROPS OPHTHALMIC 2 TIMES DAILY
COMMUNITY

## 2024-12-09 NOTE — TELEPHONE ENCOUNTER
----- Message from Berry Espinal MD sent at 12/9/2024 11:07 AM EST -----  I ordered repeat in 6 weeks since the numbers were very different from prior.  ----- Message -----  From: Diana Bullock MA  Sent: 12/9/2024   9:20 AM EST  To: Berry Espinal MD    Spoke w/patient she is taking 50mcg daily without missing any doses.  Patient stated she started taking 11/29/24 Timolol maleate 0.5% BID which indicates in her paperwork may affect thyroid levels, she just wanted to make sure you were aware of it.

## 2024-12-09 NOTE — TELEPHONE ENCOUNTER
----- Message from Diana HERNANDEZ sent at 12/9/2024  9:20 AM EST -----  Spoke w/patient she is taking 50mcg daily without missing any doses.  Patient stated she started taking 11/29/24 Timolol maleate 0.5% BID which indicates in her paperwork may affect thyroid levels, she just wanted to make sure you were aware of it.

## 2024-12-09 NOTE — TELEPHONE ENCOUNTER
----- Message from Berry Espinal MD sent at 12/9/2024  9:12 AM EST -----  Call patient.  Verifies she is taking 50 mcg daily.  If she missing any doses?  Just want to clarify that before changing the dose.  Her radiation treatments may have confuse the results before and now that she is done, the dose may need to be adjusted.

## 2025-02-15 DIAGNOSIS — T14.8XXA WOUND INFECTION: ICD-10-CM

## 2025-02-15 DIAGNOSIS — L08.9 WOUND INFECTION: ICD-10-CM

## 2025-02-16 RX ORDER — GABAPENTIN 100 MG/1
100 CAPSULE ORAL 2 TIMES DAILY
Qty: 180 CAPSULE | Refills: 3 | Status: SHIPPED | OUTPATIENT
Start: 2025-02-16

## 2025-02-17 NOTE — TELEPHONE ENCOUNTER
Patient called requesting refill for Gabapentin. Patient made aware medication was refilled on 2/16/25 for 180 capsules with 3 refills to [facility]. Patient instructed to contact the pharmacy to obtain refills of medication. Patient verbalized understanding.

## 2025-02-25 ENCOUNTER — TELEPHONE (OUTPATIENT)
Age: 85
End: 2025-02-25

## 2025-02-25 DIAGNOSIS — H53.9 VISUAL DISTURBANCE: Primary | ICD-10-CM

## 2025-02-25 NOTE — TELEPHONE ENCOUNTER
Order placed. Pt was called and informed  Also states that she is requesting order for C-reactive protein     Order pending if okay to place.   Will also schedule appt to see her eye doctor

## 2025-02-25 NOTE — TELEPHONE ENCOUNTER
Pt called asking for the sedimentation rate as she has noticed a change in her eyes and requesting it to be reordered, please call if/when it is placed

## 2025-02-26 ENCOUNTER — RA CDI HCC (OUTPATIENT)
Dept: OTHER | Facility: HOSPITAL | Age: 85
End: 2025-02-26

## 2025-02-26 NOTE — PROGRESS NOTES
M31.6  HCC coding opportunities          Chart Reviewed number of suggestions sent to Provider: 1     Patients Insurance     Medicare Insurance: Medicare

## 2025-03-03 ENCOUNTER — APPOINTMENT (OUTPATIENT)
Dept: LAB | Facility: HOSPITAL | Age: 85
End: 2025-03-03
Payer: MEDICARE

## 2025-03-03 DIAGNOSIS — E03.9 ACQUIRED HYPOTHYROIDISM: ICD-10-CM

## 2025-03-03 DIAGNOSIS — H53.9 VISUAL DISTURBANCE: ICD-10-CM

## 2025-03-03 LAB
CRP SERPL QL: 13.2 MG/L
ERYTHROCYTE [SEDIMENTATION RATE] IN BLOOD: 35 MM/HOUR (ref 0–29)
T4 FREE SERPL-MCNC: 0.87 NG/DL (ref 0.61–1.12)
TSH SERPL DL<=0.05 MIU/L-ACNC: 11.69 UIU/ML (ref 0.45–4.5)

## 2025-03-03 PROCEDURE — 36415 COLL VENOUS BLD VENIPUNCTURE: CPT

## 2025-03-03 PROCEDURE — 84443 ASSAY THYROID STIM HORMONE: CPT

## 2025-03-03 PROCEDURE — 84439 ASSAY OF FREE THYROXINE: CPT

## 2025-03-03 PROCEDURE — 86140 C-REACTIVE PROTEIN: CPT

## 2025-03-03 PROCEDURE — 85652 RBC SED RATE AUTOMATED: CPT

## 2025-03-04 ENCOUNTER — OFFICE VISIT (OUTPATIENT)
Dept: INTERNAL MEDICINE CLINIC | Facility: CLINIC | Age: 85
End: 2025-03-04
Payer: MEDICARE

## 2025-03-04 VITALS
HEIGHT: 62 IN | BODY MASS INDEX: 22.78 KG/M2 | HEART RATE: 67 BPM | OXYGEN SATURATION: 98 % | RESPIRATION RATE: 16 BRPM | WEIGHT: 123.8 LBS | DIASTOLIC BLOOD PRESSURE: 68 MMHG | SYSTOLIC BLOOD PRESSURE: 136 MMHG

## 2025-03-04 DIAGNOSIS — M05.80 POLYARTHRITIS WITH POSITIVE RHEUMATOID FACTOR (HCC): ICD-10-CM

## 2025-03-04 DIAGNOSIS — E03.9 HYPOTHYROIDISM, UNSPECIFIED TYPE: ICD-10-CM

## 2025-03-04 DIAGNOSIS — Z00.00 MEDICARE ANNUAL WELLNESS VISIT, SUBSEQUENT: Primary | ICD-10-CM

## 2025-03-04 DIAGNOSIS — F19.982 DRUG-INDUCED INSOMNIA (HCC): ICD-10-CM

## 2025-03-04 PROCEDURE — G0439 PPPS, SUBSEQ VISIT: HCPCS | Performed by: INTERNAL MEDICINE

## 2025-03-04 PROCEDURE — 99214 OFFICE O/P EST MOD 30 MIN: CPT | Performed by: INTERNAL MEDICINE

## 2025-03-04 PROCEDURE — G2211 COMPLEX E/M VISIT ADD ON: HCPCS | Performed by: INTERNAL MEDICINE

## 2025-03-04 RX ORDER — GABAPENTIN 100 MG/1
100 CAPSULE ORAL
Qty: 90 CAPSULE | Refills: 1 | Status: SHIPPED | OUTPATIENT
Start: 2025-03-04

## 2025-03-04 RX ORDER — LEVOTHYROXINE SODIUM 75 UG/1
TABLET ORAL
Qty: 90 TABLET | Refills: 1 | Status: SHIPPED | OUTPATIENT
Start: 2025-03-04

## 2025-03-04 NOTE — PROGRESS NOTES
Name: Johana Rodriguez      : 1940      MRN: [id number]  Encounter Provider: Berry Espinal MD  Encounter Date: 3/4/2025   Encounter department: [facility] INTERNAL MEDICINE [place]    Assessment & Plan  Medicare annual wellness visit, subsequent         Polyarthritis with positive rheumatoid factor (HCC)  This may be the CRP elevation.  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  She is quite nervous about that because of multiple problems with the treatments.  Orders:  •  gabapentin (NEURONTIN) 100 mg capsule; Take 1 capsule (100 mg total) by mouth daily at bedtime    Hypothyroidism, unspecified type  Adjusted her thyroid dose and recheck levels in 6 weeks.  Orders:  •  levothyroxine 75 mcg tablet; 1 tablet -, and 1/2 tab on Sat, Sun  •  T4, free; Future  •  TSH, 3rd generation; Future       Preventive health issues were discussed with patient, and age appropriate screening tests were ordered as noted in patient's After Visit Summary. Personalized health advice and appropriate referrals for health education or preventive services given if needed, as noted in patient's After Visit Summary.    History of Present Illness     Patient comes in today for routine follow-up and Medicare wellness.  She states she is doing okay for the most part.  Her thyroid levels are off slightly.  She is taking the medicine as directed.  We have been trying to adjust her dose.  Her blood work also shows her inflammatory markers are going up again.  She states she always has joint pains but also is having some trouble with her left eye again.  She knows she needs to get checked by her eye doctor to make sure the temporal arteritis is not recurring.       Patient Care Team:  Berry Espinal MD as PCP - General    Review of Systems   Respiratory:  Negative for shortness of breath.    Cardiovascular:  Negative for chest pain.   Gastrointestinal:   Negative for abdominal pain.     Medical History Reviewed by provider this encounter:       Annual Wellness Visit Questionnaire   Johana is here for her Subsequent Wellness visit.     Health Risk Assessment:   Patient rates overall health as fair. Patient feels that their physical health rating is slightly better. Patient is satisfied with their life. Eyesight was rated as slightly worse. Hearing was rated as same. Patient feels that their emotional and mental health rating is same. Patients states they are sometimes angry. Patient states they are never, rarely unusually tired/fatigued. Pain experienced in the last 7 days has been none. Patient states that she has experienced no weight loss or gain in last 6 months.     Depression Screening:   PHQ-2 Score: 0      Fall Risk Screening:   In the past year, patient has experienced: no history of falling in past year      Urinary Incontinence Screening:   Patient has not leaked urine accidently in the last six months.     Home Safety:  Patient does not have trouble with stairs inside or outside of their home. Patient has working smoke alarms and has working carbon monoxide detector. Home safety hazards include: none.     Nutrition:   Current diet is Regular.     Medications:   Patient is currently taking over-the-counter supplements. OTC medications include: see medication list. Patient is able to manage medications.     Activities of Daily Living (ADLs)/Instrumental Activities of Daily Living (IADLs):   Walk and transfer into and out of bed and chair?: Yes  Dress and groom yourself?: Yes    Bathe or shower yourself?: Yes    Feed yourself? Yes  Do your laundry/housekeeping?: Yes  Manage your money, pay your bills and track your expenses?: Yes  Make your own meals?: Yes    Do your own shopping?: Yes    Previous Hospitalizations:   Any hospitalizations or ED visits within the last 12 months?: No      Advance Care Planning:   Living will: Yes    Advanced directive: Yes     Advanced directive counseling given: Yes      Cognitive Screening:   Provider or family/friend/caregiver concerned regarding cognition?: No    PREVENTIVE SCREENINGS      Cardiovascular Screening:    General: Screening Not Indicated and History Lipid Disorder      Diabetes Screening:     General: Screening Current      Colorectal Cancer Screening:     General: Screening Not Indicated      Breast Cancer Screening:     General: Screening Not Indicated      Cervical Cancer Screening:    General: Screening Not Indicated      Osteoporosis Screening:    General: Risks and Benefits Discussed      Abdominal Aortic Aneurysm (AAA) Screening:        General: Screening Not Indicated      Lung Cancer Screening:     General: Screening Not Indicated      Hepatitis C Screening:    General: Screening Not Indicated    Screening, Brief Intervention, and Referral to Treatment (SBIRT)     Screening  Typical number of drinks in a day: 0  Typical number of drinks in a week: 0  Interpretation: Low risk drinking behavior.    AUDIT-C Screenin) How often did you have a drink containing alcohol in the past year? never  2) How many drinks did you have on a typical day when you were drinking in the past year? 0  3) How often did you have 6 or more drinks on one occasion in the past year? never    AUDIT-C Score: 0  Interpretation: Score 0-2 (female): Negative screen for alcohol misuse    Brief Intervention  Alcohol & drug use screenings were reviewed. No concerns regarding substance use disorder identified.     Social Drivers of Health     Food Insecurity: No Food Insecurity (3/4/2025)    Hunger Vital Sign    • Worried About Running Out of Food in the Last Year: Never true    • Ran Out of Food in the Last Year: Never true   Transportation Needs: No Transportation Needs (3/4/2025)    PRAPARE - Transportation    • Lack of Transportation (Medical): No    • Lack of Transportation (Non-Medical): No   Housing Stability: Low Risk  (3/4/2025)     "Housing Stability Vital Sign    • Unable to Pay for Housing in the Last Year: No    • Number of Times Moved in the Last Year: 1    • Homeless in the Last Year: No   Utilities: Not At Risk (3/4/2025)    [facility] Utilities    • Threatened with loss of utilities: No     No results found.    Objective   /68 (BP Location: Left arm, Patient Position: Sitting, Cuff Size: Adult)   Pulse 67   Resp 16   Ht 5' 2\" (1.575 m)   Wt 56.2 kg (123 lb 12.8 oz)   SpO2 98%   BMI 22.64 kg/m²     Physical Exam  Vitals and nursing note reviewed.   Constitutional:       Appearance: She is well-developed.   Cardiovascular:      Rate and Rhythm: Normal rate and regular rhythm.   Pulmonary:      Effort: Pulmonary effort is normal.      Breath sounds: Normal breath sounds.   Abdominal:      General: Abdomen is flat.      Tenderness: There is no abdominal tenderness.   Musculoskeletal:      Right lower leg: No edema.      Left lower leg: No edema.   Neurological:      Mental Status: She is alert and oriented to person, place, and time.   Psychiatric:         Behavior: Behavior normal.         Thought Content: Thought content normal.         Judgment: Judgment normal.         "

## 2025-03-04 NOTE — PATIENT INSTRUCTIONS
Medicare Preventive Visit Patient Instructions  Thank you for completing your Welcome to Medicare Visit or Medicare Annual Wellness Visit today. Your next wellness visit will be due in one year (3/5/2026).  The screening/preventive services that you may require over the next 5-10 years are detailed below. Some tests may not apply to you based off risk factors and/or age. Screening tests ordered at today's visit but not completed yet may show as past due. Also, please note that scanned in results may not display below.  Preventive Screenings:  Service Recommendations Previous Testing/Comments   Colorectal Cancer Screening  * Colonoscopy    * Fecal Occult Blood Test (FOBT)/Fecal Immunochemical Test (FIT)  * Fecal DNA/Cologuard Test  * Flexible Sigmoidoscopy Age: 45-75 years old   Colonoscopy: every 10 years (may be performed more frequently if at higher risk)  OR  FOBT/FIT: every 1 year  OR  Cologuard: every 3 years  OR  Sigmoidoscopy: every 5 years  Screening may be recommended earlier than age 45 if at higher risk for colorectal cancer. Also, an individualized decision between you and your healthcare provider will decide whether screening between the ages of 76-85 would be appropriate. Colonoscopy: Not on file  FOBT/FIT: Not on file  Cologuard: Not on file  Sigmoidoscopy: Not on file          Breast Cancer Screening Age: 40+ years old  Frequency: every 1-2 years  Not required if history of left and right mastectomy Mammogram: Not on file        Cervical Cancer Screening Between the ages of 21-29, pap smear recommended once every 3 years.   Between the ages of 30-65, can perform pap smear with HPV co-testing every 5 years.   Recommendations may differ for women with a history of total hysterectomy, cervical cancer, or abnormal pap smears in past. Pap Smear: Not on file    Screening Not Indicated   Hepatitis C Screening Once for adults born between 1945 and 1965  More frequently in patients at high risk for Hepatitis  C Hep C Antibody: Not on file        Diabetes Screening 1-2 times per year if you're at risk for diabetes or have pre-diabetes Fasting glucose: 86 mg/dL (6/21/2024)  A1C: No results in last 5 years (No results in last 5 years)  Screening Current   Cholesterol Screening Once every 5 years if you don't have a lipid disorder. May order more often based on risk factors. Lipid panel: 06/21/2024    Screening Not Indicated  History Lipid Disorder     Other Preventive Screenings Covered by Medicare:  Abdominal Aortic Aneurysm (AAA) Screening: covered once if your at risk. You're considered to be at risk if you have a family history of AAA.  Lung Cancer Screening: covers low dose CT scan once per year if you meet all of the following conditions: (1) Age 55-77; (2) No signs or symptoms of lung cancer; (3) Current smoker or have quit smoking within the last 15 years; (4) You have a tobacco smoking history of at least 20 pack years (packs per day multiplied by number of years you smoked); (5) You get a written order from a healthcare provider.  Glaucoma Screening: covered annually if you're considered high risk: (1) You have diabetes OR (2) Family history of glaucoma OR (3)  aged 50 and older OR (4)  American aged 65 and older  Osteoporosis Screening: covered every 2 years if you meet one of the following conditions: (1) You're estrogen deficient and at risk for osteoporosis based off medical history and other findings; (2) Have a vertebral abnormality; (3) On glucocorticoid therapy for more than 3 months; (4) Have primary hyperparathyroidism; (5) On osteoporosis medications and need to assess response to drug therapy.   Last bone density test (DXA Scan): 07/19/2022.  HIV Screening: covered annually if you're between the age of 15-65. Also covered annually if you are younger than 15 and older than 65 with risk factors for HIV infection. For pregnant patients, it is covered up to 3 times per  pregnancy.    Immunizations:  Immunization Recommendations   Influenza Vaccine Annual influenza vaccination during flu season is recommended for all persons aged >= 6 months who do not have contraindications   Pneumococcal Vaccine   * Pneumococcal conjugate vaccine = PCV13 (Prevnar 13), PCV15 (Vaxneuvance), PCV20 (Prevnar 20)  * Pneumococcal polysaccharide vaccine = PPSV23 (Pneumovax) Adults 19-65 yo with certain risk factors or if 65+ yo  If never received any pneumonia vaccine: recommend Prevnar 20 (PCV20)  Give PCV20 if previously received 1 dose of PCV13 or PPSV23   Hepatitis B Vaccine 3 dose series if at intermediate or high risk (ex: diabetes, end stage renal disease, liver disease)   Respiratory syncytial virus (RSV) Vaccine - COVERED BY MEDICARE PART D  * RSVPreF3 (Arexvy) CDC recommends that adults 60 years of age and older may receive a single dose of RSV vaccine using shared clinical decision-making (SCDM)   Tetanus (Td) Vaccine - COST NOT COVERED BY MEDICARE PART B Following completion of primary series, a booster dose should be given every 10 years to maintain immunity against tetanus. Td may also be given as tetanus wound prophylaxis.   Tdap Vaccine - COST NOT COVERED BY MEDICARE PART B Recommended at least once for all adults. For pregnant patients, recommended with each pregnancy.   Shingles Vaccine (Shingrix) - COST NOT COVERED BY MEDICARE PART B  2 shot series recommended in those 19 years and older who have or will have weakened immune systems or those 50 years and older     Health Maintenance Due:      Topic Date Due   • Colorectal Cancer Screening  01/07/2021     Immunizations Due:      Topic Date Due   • Pneumococcal Vaccine: 65+ Years (1 of 1 - PCV) Never done   • Influenza Vaccine (1) Never done   • COVID-19 Vaccine (3 - 2024-25 season) 09/01/2024     Advance Directives   What are advance directives?  Advance directives are legal documents that state your wishes and plans for medical care.  These plans are made ahead of time in case you lose your ability to make decisions for yourself. Advance directives can apply to any medical decision, such as the treatments you want, and if you want to donate organs.   What are the types of advance directives?  There are many types of advance directives, and each state has rules about how to use them. You may choose a combination of any of the following:  Living will:  This is a written record of the treatment you want. You can also choose which treatments you do not want, which to limit, and which to stop at a certain time. This includes surgery, medicine, IV fluid, and tube feedings.   Durable power of  for healthcare (DPAHC):  This is a written record that states who you want to make healthcare choices for you when you are unable to make them for yourself. This person, called a proxy, is usually a family member or a friend. You may choose more than 1 proxy.  Do not resuscitate (DNR) order:  A DNR order is used in case your heart stops beating or you stop breathing. It is a request not to have certain forms of treatment, such as CPR. A DNR order may be included in other types of advance directives.  Medical directive:  This covers the care that you want if you are in a coma, near death, or unable to make decisions for yourself. You can list the treatments you want for each condition. Treatment may include pain medicine, surgery, blood transfusions, dialysis, IV or tube feedings, and a ventilator (breathing machine).  Values history:  This document has questions about your views, beliefs, and how you feel and think about life. This information can help others choose the care that you would choose.  Why are advance directives important?  An advance directive helps you control your care. Although spoken wishes may be used, it is better to have your wishes written down. Spoken wishes can be misunderstood, or not followed. Treatments may be given even if you  do not want them. An advance directive may make it easier for your family to make difficult choices about your care.   Urinary Incontinence   Urinary incontinence (UI)  is when you lose control of your bladder. UI develops because your bladder cannot store or empty urine properly. The 3 most common types of UI are stress incontinence, urge incontinence, or both.  Medicines:   May be given to help strengthen your bladder control. Report any side effects of medication to your healthcare provider.  Do pelvic muscle exercises often:  Your pelvic muscles help you stop urinating. Squeeze these muscles tight for 5 seconds, then relax for 5 seconds. Gradually work up to squeezing for 10 seconds. Do 3 sets of 15 repetitions a day, or as directed. This will help strengthen your pelvic muscles and improve bladder control.  Train your bladder:  Go to the bathroom at set times, such as every 2 hours, even if you do not feel the urge to go. You can also try to hold your urine when you feel the urge to go. For example, hold your urine for 5 minutes when you feel the urge to go. As that becomes easier, hold your urine for 10 minutes.   Self-care:   Keep a UI record.  Write down how often you leak urine and how much you leak. Make a note of what you were doing when you leaked urine.  Drink liquids as directed. You may need to limit the amount of liquid you drink to help control your urine leakage. Do not drink any liquid right before you go to bed. Limit or do not have drinks that contain caffeine or alcohol.   Prevent constipation.  Eat a variety of high-fiber foods. Good examples are high-fiber cereals, beans, vegetables, and whole-grain breads. Walking is the best way to trigger your intestines to have a bowel movement.  Exercise regularly and maintain a healthy weight.  Weight loss and exercise will decrease pressure on your bladder and help you control your leakage.   Use a catheter as directed  to help empty your bladder. A  catheter is a tiny, plastic tube that is put into your bladder to drain your urine.   Go to behavior therapy as directed.  Behavior therapy may be used to help you learn to control your urge to urinate.     © Copyright [employer] 2018 Information is for End User's use only and may not be sold, redistributed or otherwise used for commercial purposes. All illustrations and images included in CareNotes® are the copyrighted property of [employer] Inc[employer] or [employer]

## 2025-03-04 NOTE — ASSESSMENT & PLAN NOTE
This may be the CRP elevation.  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  She is quite nervous about that because of multiple problems with the treatments.  Orders:  •  gabapentin (NEURONTIN) 100 mg capsule; Take 1 capsule (100 mg total) by mouth daily at bedtime

## 2025-03-05 RX ORDER — TRAZODONE HYDROCHLORIDE 50 MG/1
50 TABLET ORAL
Qty: 90 TABLET | Refills: 1 | Status: SHIPPED | OUTPATIENT
Start: 2025-03-05

## 2025-03-28 DIAGNOSIS — E03.9 HYPOTHYROIDISM, UNSPECIFIED TYPE: ICD-10-CM

## 2025-03-28 RX ORDER — LEVOTHYROXINE SODIUM 50 UG/1
50 TABLET ORAL DAILY
Qty: 90 TABLET | Refills: 3 | Status: SHIPPED | OUTPATIENT
Start: 2025-03-28

## 2025-04-04 DIAGNOSIS — I10 ESSENTIAL HYPERTENSION: ICD-10-CM

## 2025-04-05 RX ORDER — LISINOPRIL 5 MG/1
5 TABLET ORAL DAILY
Qty: 90 TABLET | Refills: 3 | Status: SHIPPED | OUTPATIENT
Start: 2025-04-05

## 2025-04-25 ENCOUNTER — APPOINTMENT (OUTPATIENT)
Dept: LAB | Facility: HOSPITAL | Age: 85
End: 2025-04-25
Attending: INTERNAL MEDICINE
Payer: MEDICARE

## 2025-04-25 DIAGNOSIS — E03.9 HYPOTHYROIDISM, UNSPECIFIED TYPE: ICD-10-CM

## 2025-04-25 LAB
T4 FREE SERPL-MCNC: 1.14 NG/DL (ref 0.61–1.12)
TSH SERPL DL<=0.05 MIU/L-ACNC: 4.44 UIU/ML (ref 0.45–4.5)

## 2025-04-25 PROCEDURE — 84439 ASSAY OF FREE THYROXINE: CPT

## 2025-04-25 PROCEDURE — 36415 COLL VENOUS BLD VENIPUNCTURE: CPT

## 2025-04-25 PROCEDURE — 84443 ASSAY THYROID STIM HORMONE: CPT

## 2025-04-28 ENCOUNTER — RESULTS FOLLOW-UP (OUTPATIENT)
Dept: INTERNAL MEDICINE CLINIC | Facility: CLINIC | Age: 85
End: 2025-04-28

## 2025-05-23 ENCOUNTER — TELEPHONE (OUTPATIENT)
Age: 85
End: 2025-05-23

## 2025-05-23 NOTE — TELEPHONE ENCOUNTER
Called office to establish as a NP. Offered and scheduled soonest available in [place]  for 2/06/2026 and added her to cancellation list. Pt is interested in [facility] office if any openings.    Insurance was verified and address was provided to patient.

## 2025-07-08 DIAGNOSIS — R19.7 DIARRHEA, UNSPECIFIED TYPE: ICD-10-CM

## 2025-07-08 NOTE — TELEPHONE ENCOUNTER
Reason for call:   [x] Refill   [] Prior Auth  [] Other:     Office:   [x] PCP/Provider -  Berry Espinal MD  [] Specialty/Provider -     Medication: diphenoxylate-atropine (LOMOTIL) 2.5-0.025 mg per tablet     Dose/Frequency: Take 1 tablet by mouth 4 (four) times a day as needed for diarrhea     Quantity: 30 tablet (8 day supply)     Pharmacy: [facility] [address] [facility]   Does the patient have enough for 3 days?   [] Yes   [] No - Send as HP to POD    Mail Away Pharmacy   Does the patient have enough for 10 days?   [x] Yes   [] No - Send as HP to POD

## 2025-07-09 ENCOUNTER — TELEPHONE (OUTPATIENT)
Age: 85
End: 2025-07-09

## 2025-07-09 DIAGNOSIS — R19.7 DIARRHEA, UNSPECIFIED TYPE: ICD-10-CM

## 2025-07-09 RX ORDER — DIPHENOXYLATE HYDROCHLORIDE AND ATROPINE SULFATE 2.5; .025 MG/1; MG/1
1 TABLET ORAL DAILY PRN
Qty: 90 TABLET | Refills: 1 | Status: SHIPPED | OUTPATIENT
Start: 2025-07-09

## 2025-07-09 NOTE — TELEPHONE ENCOUNTER
Patient would like her prescription for:    diphenoxylate-atropine (LOMOTIL) 2.5-0.025 mg per tablet     **Instead of the dose written patient would like  Take one tablet by mouth once daily as needed for diarrhea.    Patient would like a 90 day supply sent to [facility].  **This is a change in pharmacy    Please advise, thank you.

## 2025-07-10 RX ORDER — DIPHENOXYLATE HYDROCHLORIDE AND ATROPINE SULFATE 2.5; .025 MG/1; MG/1
1 TABLET ORAL 4 TIMES DAILY PRN
Qty: 30 TABLET | Refills: 0 | OUTPATIENT
Start: 2025-07-10

## 2025-07-21 ENCOUNTER — TELEPHONE (OUTPATIENT)
Dept: INTERNAL MEDICINE CLINIC | Facility: CLINIC | Age: 85
End: 2025-07-21

## 2025-07-21 DIAGNOSIS — F19.982 DRUG-INDUCED INSOMNIA (HCC): ICD-10-CM

## 2025-07-21 RX ORDER — TRAZODONE HYDROCHLORIDE 50 MG/1
50 TABLET ORAL
Qty: 90 TABLET | Refills: 1 | Status: SHIPPED | OUTPATIENT
Start: 2025-07-21 | End: 2025-07-21 | Stop reason: SDUPTHER

## 2025-07-21 RX ORDER — TRAZODONE HYDROCHLORIDE 50 MG/1
50 TABLET ORAL
Qty: 10 TABLET | Refills: 1 | Status: SHIPPED | OUTPATIENT
Start: 2025-07-21

## 2025-07-21 NOTE — TELEPHONE ENCOUNTER
Can she also have a 1 week supply of the trazadone to [facility]?     To hold her over till mailway comes in

## 2025-07-21 NOTE — TELEPHONE ENCOUNTER
Medication: trazadone    Dose/Frequency: 50mg HS    Quantity: 90 w 1    [facility]    Office:   [x] PCP/Provider -   [] Speciality/Provider -     Does the patient have enough for 3 days?   [] Yes   [x] No - Send as HP to POD    Patient requesting short course to local [facility] until mail order arrives.,

## 2025-07-22 ENCOUNTER — NURSE TRIAGE (OUTPATIENT)
Age: 85
End: 2025-07-22

## 2025-07-22 NOTE — TELEPHONE ENCOUNTER
Contacted pharmacy. There was an error in the date which was corrected. Pharmacy will fill prescription today. LMOM advising pt.

## 2025-07-22 NOTE — TELEPHONE ENCOUNTER
Pt called in returning missed call she received.     Relayed the following message to pt:  Josseline Solis LPN    7/22/25 11:30 AM  Note      Contacted pharmacy. There was an error in the date which was corrected. Pharmacy will fill prescription today. LMOM advising pt.         Pt understood & very thankful!

## 2025-07-22 NOTE — TELEPHONE ENCOUNTER
Per chart, it appears this prescription was sent to [facility] yesterday: Receipt confirmed by pharmacy (7/21/2025  1:09 PM EDT)       Regarding: Resend medication  ----- Message from Lauren KAYE sent at 7/22/2025 10:42 AM EDT -----  Patient states her traZODone (DESYREL) 50 mg tablet was not received by her pharmacy.  Can it be resent to:    [id number] - [place] - [address]   [address] [facility] [place] [address]  Phone: [phone]  Fax: [phone]    Thank you.

## 2025-07-28 ENCOUNTER — TELEPHONE (OUTPATIENT)
Age: 85
End: 2025-07-28

## 2025-07-28 DIAGNOSIS — F19.982 DRUG-INDUCED INSOMNIA (HCC): ICD-10-CM

## 2025-07-28 RX ORDER — TRAZODONE HYDROCHLORIDE 50 MG/1
50 TABLET ORAL
Qty: 90 TABLET | Refills: 1 | Status: SHIPPED | OUTPATIENT
Start: 2025-07-28

## (undated) DEVICE — INTENDED FOR TISSUE SEPARATION, AND OTHER PROCEDURES THAT REQUIRE A SHARP SURGICAL BLADE TO PUNCTURE OR CUT.: Brand: BARD-PARKER SAFETY BLADES SIZE 15, STERILE

## (undated) DEVICE — 3M™ TEGADERM™ TRANSPARENT FILM DRESSING FRAME STYLE, 1624W, 2-3/8 IN X 2-3/4 IN (6 CM X 7 CM), 100/CT 4CT/CASE: Brand: 3M™ TEGADERM™

## (undated) DEVICE — POOLE SUCTION HANDLE: Brand: CARDINAL HEALTH

## (undated) DEVICE — PAD GROUNDING ADULT

## (undated) DEVICE — BETHLEHEM UNIVERSAL MINOR GEN: Brand: CARDINAL HEALTH

## (undated) DEVICE — GAUZE SPONGES,8 PLY: Brand: CURITY

## (undated) DEVICE — DRAPE EQUIPMENT RF WAND

## (undated) DEVICE — SUT VICRYL 4-0 PS-2 27 IN J426H

## (undated) DEVICE — ELECTRODE NEEDLE E-Z CLEAN 2.75IN 7CM -0013

## (undated) DEVICE — LUBRICANT SURGILUBE PACKETS STERILE 144 X 3 GRAMS

## (undated) DEVICE — COTTON BALLS: Brand: DEROYAL

## (undated) DEVICE — TUBING SUCTION 5MM X 12 FT

## (undated) DEVICE — POV-IOD SWAB STICKS

## (undated) DEVICE — 3M™ STERI-STRIP™ REINFORCED ADHESIVE SKIN CLOSURES, R1542, 1/4 IN X 1-1/2 IN (6 MM X 38 MM), 6 STRIPS/ENVELOPE: Brand: 3M™ STERI-STRIP™

## (undated) DEVICE — NEPTUNE E-SEP SMOKE EVACUATION PENCIL, COATED, 70MM BLADE, PUSH BUTTON SWITCH: Brand: NEPTUNE E-SEP

## (undated) DEVICE — LIGHT HANDLE COVER SLEEVE DISP BLUE STELLAR

## (undated) DEVICE — DRESSING TELFA 2 X 3 IN STRL

## (undated) DEVICE — NEEDLE 25G X 1 1/2

## (undated) DEVICE — SYRINGE BULB 2 OZ

## (undated) DEVICE — SUT VICRYL 3-0 18 IN J110T